# Patient Record
Sex: MALE | Race: WHITE | NOT HISPANIC OR LATINO | Employment: OTHER | URBAN - METROPOLITAN AREA
[De-identification: names, ages, dates, MRNs, and addresses within clinical notes are randomized per-mention and may not be internally consistent; named-entity substitution may affect disease eponyms.]

---

## 2017-01-04 PROBLEM — R79.1 SUPRATHERAPEUTIC INR: Status: ACTIVE | Noted: 2017-01-04

## 2017-02-01 ENCOUNTER — HOSPITAL ENCOUNTER (OUTPATIENT)
Facility: HOSPITAL | Age: 72
LOS: 1 days | Discharge: HOSPICE | End: 2017-02-03
Attending: EMERGENCY MEDICINE | Admitting: INTERNAL MEDICINE
Payer: MEDICARE
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
## 2017-02-01 DIAGNOSIS — R06.02 SOB (SHORTNESS OF BREATH): ICD-10-CM

## 2017-02-01 DIAGNOSIS — I25.10 CORONARY ARTERY DISEASE, ANGINA PRESENCE UNSPECIFIED, UNSPECIFIED VESSEL OR LESION TYPE, UNSPECIFIED WHETHER NATIVE OR TRANSPLANTED HEART: ICD-10-CM

## 2017-02-01 DIAGNOSIS — J44.1 COPD WITH EXACERBATION: Primary | ICD-10-CM

## 2017-02-01 DIAGNOSIS — I10 ESSENTIAL HYPERTENSION: ICD-10-CM

## 2017-02-01 DIAGNOSIS — J18.9 HOSPITAL-ACQUIRED PNEUMONIA: ICD-10-CM

## 2017-02-01 DIAGNOSIS — B20 HIV (HUMAN IMMUNODEFICIENCY VIRUS INFECTION): ICD-10-CM

## 2017-02-01 PROBLEM — Z79.01 LONG TERM (CURRENT) USE OF ANTICOAGULANTS: Status: ACTIVE | Noted: 2017-01-04

## 2017-02-01 LAB
ALBUMIN SERPL BCP-MCNC: 3 G/DL
ALP SERPL-CCNC: 135 U/L
ALT SERPL W/O P-5'-P-CCNC: 5 U/L
ANION GAP SERPL CALC-SCNC: 9 MMOL/L
AST SERPL-CCNC: 13 U/L
BASOPHILS # BLD AUTO: 0 K/UL
BASOPHILS NFR BLD: 0.3 %
BILIRUB SERPL-MCNC: 0.2 MG/DL
BNP SERPL-MCNC: 396 PG/ML
BUN SERPL-MCNC: 14 MG/DL
CALCIUM SERPL-MCNC: 9.1 MG/DL
CHLORIDE SERPL-SCNC: 103 MMOL/L
CO2 SERPL-SCNC: 30 MMOL/L
CREAT SERPL-MCNC: 1 MG/DL
DIFFERENTIAL METHOD: ABNORMAL
EOSINOPHIL # BLD AUTO: 0.9 K/UL
EOSINOPHIL NFR BLD: 11.3 %
ERYTHROCYTE [DISTWIDTH] IN BLOOD BY AUTOMATED COUNT: 15.7 %
EST. GFR  (AFRICAN AMERICAN): >60 ML/MIN/1.73 M^2
EST. GFR  (NON AFRICAN AMERICAN): >60 ML/MIN/1.73 M^2
GLUCOSE SERPL-MCNC: 117 MG/DL
HCT VFR BLD AUTO: 39.3 %
HGB BLD-MCNC: 12.6 G/DL
INR PPP: 1.2
LYMPHOCYTES # BLD AUTO: 3.2 K/UL
LYMPHOCYTES NFR BLD: 39.9 %
MCH RBC QN AUTO: 29.7 PG
MCHC RBC AUTO-ENTMCNC: 32 %
MCV RBC AUTO: 93 FL
MONOCYTES # BLD AUTO: 0.6 K/UL
MONOCYTES NFR BLD: 7.7 %
NEUTROPHILS # BLD AUTO: 3.3 K/UL
NEUTROPHILS NFR BLD: 40.8 %
PLATELET # BLD AUTO: 157 K/UL
PMV BLD AUTO: 7.6 FL
POTASSIUM SERPL-SCNC: 3.5 MMOL/L
PROT SERPL-MCNC: 6.4 G/DL
PROTHROMBIN TIME: 12.7 SEC
RBC # BLD AUTO: 4.24 M/UL
SODIUM SERPL-SCNC: 142 MMOL/L
WBC # BLD AUTO: 8 K/UL

## 2017-02-01 PROCEDURE — 63600175 PHARM REV CODE 636 W HCPCS: Performed by: EMERGENCY MEDICINE

## 2017-02-01 PROCEDURE — 25000003 PHARM REV CODE 250: Performed by: EMERGENCY MEDICINE

## 2017-02-01 PROCEDURE — 94761 N-INVAS EAR/PLS OXIMETRY MLT: CPT

## 2017-02-01 PROCEDURE — 25000242 PHARM REV CODE 250 ALT 637 W/ HCPCS: Performed by: EMERGENCY MEDICINE

## 2017-02-01 PROCEDURE — 36415 COLL VENOUS BLD VENIPUNCTURE: CPT

## 2017-02-01 PROCEDURE — 99285 EMERGENCY DEPT VISIT HI MDM: CPT | Mod: 25

## 2017-02-01 PROCEDURE — G0378 HOSPITAL OBSERVATION PER HR: HCPCS

## 2017-02-01 PROCEDURE — 85610 PROTHROMBIN TIME: CPT

## 2017-02-01 PROCEDURE — 83880 ASSAY OF NATRIURETIC PEPTIDE: CPT

## 2017-02-01 PROCEDURE — 63600175 PHARM REV CODE 636 W HCPCS: Performed by: INTERNAL MEDICINE

## 2017-02-01 PROCEDURE — 25000242 PHARM REV CODE 250 ALT 637 W/ HCPCS: Performed by: NURSE PRACTITIONER

## 2017-02-01 PROCEDURE — 27000221 HC OXYGEN, UP TO 24 HOURS

## 2017-02-01 PROCEDURE — 94640 AIRWAY INHALATION TREATMENT: CPT

## 2017-02-01 PROCEDURE — 80053 COMPREHEN METABOLIC PANEL: CPT

## 2017-02-01 PROCEDURE — 97802 MEDICAL NUTRITION INDIV IN: CPT | Mod: 59

## 2017-02-01 PROCEDURE — 85025 COMPLETE CBC W/AUTO DIFF WBC: CPT

## 2017-02-01 PROCEDURE — 25000003 PHARM REV CODE 250: Performed by: INTERNAL MEDICINE

## 2017-02-01 PROCEDURE — 25000003 PHARM REV CODE 250: Performed by: NURSE PRACTITIONER

## 2017-02-01 PROCEDURE — 96365 THER/PROPH/DIAG IV INF INIT: CPT

## 2017-02-01 PROCEDURE — 99220 PR INITIAL OBSERVATION CARE,LEVL III: CPT | Mod: GW,,, | Performed by: INTERNAL MEDICINE

## 2017-02-01 RX ORDER — IPRATROPIUM BROMIDE AND ALBUTEROL SULFATE 2.5; .5 MG/3ML; MG/3ML
3 SOLUTION RESPIRATORY (INHALATION) EVERY 6 HOURS
Status: DISCONTINUED | OUTPATIENT
Start: 2017-02-01 | End: 2017-02-01

## 2017-02-01 RX ORDER — GABAPENTIN 300 MG/1
900 CAPSULE ORAL 3 TIMES DAILY
Status: DISCONTINUED | OUTPATIENT
Start: 2017-02-01 | End: 2017-02-03 | Stop reason: HOSPADM

## 2017-02-01 RX ORDER — DRONABINOL 2.5 MG/1
5 CAPSULE ORAL
Status: DISCONTINUED | OUTPATIENT
Start: 2017-02-01 | End: 2017-02-03 | Stop reason: HOSPADM

## 2017-02-01 RX ORDER — EMTRICITABINE 200 MG/1
200 CAPSULE ORAL NIGHTLY
Status: DISCONTINUED | OUTPATIENT
Start: 2017-02-01 | End: 2017-02-03 | Stop reason: HOSPADM

## 2017-02-01 RX ORDER — ACETAMINOPHEN 325 MG/1
650 TABLET ORAL EVERY 6 HOURS PRN
Status: DISCONTINUED | OUTPATIENT
Start: 2017-02-01 | End: 2017-02-03 | Stop reason: HOSPADM

## 2017-02-01 RX ORDER — METHYLPREDNISOLONE SOD SUCC 125 MG
80 VIAL (EA) INJECTION EVERY 6 HOURS
Status: DISCONTINUED | OUTPATIENT
Start: 2017-02-01 | End: 2017-02-02

## 2017-02-01 RX ORDER — TRAMADOL HYDROCHLORIDE 50 MG/1
50 TABLET ORAL EVERY 6 HOURS PRN
COMMUNITY

## 2017-02-01 RX ORDER — DEXAMETHASONE SODIUM PHOSPHATE 4 MG/ML
12 INJECTION, SOLUTION INTRA-ARTICULAR; INTRALESIONAL; INTRAMUSCULAR; INTRAVENOUS; SOFT TISSUE
Status: COMPLETED | OUTPATIENT
Start: 2017-02-01 | End: 2017-02-01

## 2017-02-01 RX ORDER — TENOFOVIR DISOPROXIL FUMARATE 300 MG/1
300 TABLET, FILM COATED ORAL NIGHTLY
Status: DISCONTINUED | OUTPATIENT
Start: 2017-02-01 | End: 2017-02-03 | Stop reason: HOSPADM

## 2017-02-01 RX ORDER — FAMOTIDINE 20 MG/1
20 TABLET, FILM COATED ORAL 2 TIMES DAILY
Status: DISCONTINUED | OUTPATIENT
Start: 2017-02-01 | End: 2017-02-03 | Stop reason: HOSPADM

## 2017-02-01 RX ORDER — FLUOXETINE HYDROCHLORIDE 20 MG/1
40 CAPSULE ORAL NIGHTLY
Status: DISCONTINUED | OUTPATIENT
Start: 2017-02-01 | End: 2017-02-03 | Stop reason: HOSPADM

## 2017-02-01 RX ORDER — EFAVIRENZ 600 MG/1
600 TABLET, FILM COATED ORAL NIGHTLY
Status: DISCONTINUED | OUTPATIENT
Start: 2017-02-01 | End: 2017-02-03 | Stop reason: HOSPADM

## 2017-02-01 RX ORDER — TOPIRAMATE 25 MG/1
50 TABLET ORAL 2 TIMES DAILY
Status: DISCONTINUED | OUTPATIENT
Start: 2017-02-01 | End: 2017-02-03 | Stop reason: HOSPADM

## 2017-02-01 RX ORDER — HYDROCODONE BITARTRATE AND ACETAMINOPHEN 7.5; 325 MG/1; MG/1
TABLET ORAL EVERY 4 HOURS PRN
Status: DISCONTINUED | OUTPATIENT
Start: 2017-02-01 | End: 2017-02-03 | Stop reason: HOSPADM

## 2017-02-01 RX ORDER — VALACYCLOVIR HYDROCHLORIDE 500 MG/1
500 TABLET, FILM COATED ORAL 2 TIMES DAILY
COMMUNITY

## 2017-02-01 RX ORDER — SUCRALFATE 1 G/1
1 TABLET ORAL 4 TIMES DAILY
Status: DISCONTINUED | OUTPATIENT
Start: 2017-02-01 | End: 2017-02-03 | Stop reason: HOSPADM

## 2017-02-01 RX ORDER — IBUPROFEN 200 MG
1 TABLET ORAL DAILY
Status: DISCONTINUED | OUTPATIENT
Start: 2017-02-01 | End: 2017-02-03 | Stop reason: HOSPADM

## 2017-02-01 RX ORDER — IPRATROPIUM BROMIDE AND ALBUTEROL SULFATE 2.5; .5 MG/3ML; MG/3ML
3 SOLUTION RESPIRATORY (INHALATION)
Status: COMPLETED | OUTPATIENT
Start: 2017-02-01 | End: 2017-02-01

## 2017-02-01 RX ORDER — MORPHINE SULFATE ORAL SOLUTION 10 MG/5ML
15 SOLUTION ORAL EVERY 6 HOURS PRN
Status: DISCONTINUED | OUTPATIENT
Start: 2017-02-01 | End: 2017-02-03 | Stop reason: HOSPADM

## 2017-02-01 RX ORDER — MAGNESIUM SULFATE/D5W 1 G/50 ML
1 INTRAVENOUS SOLUTION, PIGGYBACK (ML) INTRAVENOUS ONCE
Status: COMPLETED | OUTPATIENT
Start: 2017-02-01 | End: 2017-02-01

## 2017-02-01 RX ORDER — TRAMADOL HYDROCHLORIDE 50 MG/1
50 TABLET ORAL EVERY 6 HOURS PRN
Status: DISCONTINUED | OUTPATIENT
Start: 2017-02-01 | End: 2017-02-03 | Stop reason: HOSPADM

## 2017-02-01 RX ORDER — IPRATROPIUM BROMIDE AND ALBUTEROL SULFATE 2.5; .5 MG/3ML; MG/3ML
3 SOLUTION RESPIRATORY (INHALATION) EVERY 6 HOURS
Status: DISCONTINUED | OUTPATIENT
Start: 2017-02-01 | End: 2017-02-03 | Stop reason: HOSPADM

## 2017-02-01 RX ORDER — CLOPIDOGREL BISULFATE 75 MG/1
75 TABLET ORAL DAILY
Status: DISCONTINUED | OUTPATIENT
Start: 2017-02-01 | End: 2017-02-03 | Stop reason: HOSPADM

## 2017-02-01 RX ORDER — MAGNESIUM SULFATE 500 MG/ML
1 INJECTION, SOLUTION INTRAMUSCULAR; INTRAVENOUS ONCE
Status: DISCONTINUED | OUTPATIENT
Start: 2017-02-01 | End: 2017-02-01

## 2017-02-01 RX ORDER — METOPROLOL TARTRATE 25 MG/1
12.5 TABLET ORAL 2 TIMES DAILY
Status: DISCONTINUED | OUTPATIENT
Start: 2017-02-01 | End: 2017-02-03 | Stop reason: HOSPADM

## 2017-02-01 RX ORDER — EFAVIRENZ, EMTRICITABINE AND TENOFOVIR DISOPROXIL FUMARATE 600; 200; 300 MG/1; MG/1; MG/1
1 TABLET, FILM COATED ORAL NIGHTLY
Status: DISCONTINUED | OUTPATIENT
Start: 2017-02-01 | End: 2017-02-01

## 2017-02-01 RX ORDER — IBUPROFEN 200 MG
1 TABLET ORAL
Status: COMPLETED | OUTPATIENT
Start: 2017-02-01 | End: 2017-02-02

## 2017-02-01 RX ORDER — ONDANSETRON 4 MG/1
8 TABLET, ORALLY DISINTEGRATING ORAL ONCE
COMMUNITY
End: 2017-10-12

## 2017-02-01 RX ORDER — SUCRALFATE 1 G/1
1 TABLET ORAL
Status: DISCONTINUED | OUTPATIENT
Start: 2017-02-01 | End: 2017-02-01

## 2017-02-01 RX ADMIN — Medication 12.5 MG: at 09:02

## 2017-02-01 RX ADMIN — TOPIRAMATE 50 MG: 25 TABLET, FILM COATED ORAL at 09:02

## 2017-02-01 RX ADMIN — NICOTINE 1 PATCH: 21 PATCH TRANSDERMAL at 06:02

## 2017-02-01 RX ADMIN — GABAPENTIN 900 MG: 300 CAPSULE ORAL at 06:02

## 2017-02-01 RX ADMIN — FAMOTIDINE 20 MG: 20 TABLET, FILM COATED ORAL at 09:02

## 2017-02-01 RX ADMIN — IPRATROPIUM BROMIDE AND ALBUTEROL SULFATE 3 ML: .5; 3 SOLUTION RESPIRATORY (INHALATION) at 12:02

## 2017-02-01 RX ADMIN — DEXAMETHASONE SODIUM PHOSPHATE 12 MG: 4 INJECTION, SOLUTION INTRAMUSCULAR; INTRAVENOUS at 03:02

## 2017-02-01 RX ADMIN — METHYLPREDNISOLONE SODIUM SUCCINATE 80 MG: 125 INJECTION, POWDER, FOR SOLUTION INTRAMUSCULAR; INTRAVENOUS at 12:02

## 2017-02-01 RX ADMIN — IPRATROPIUM BROMIDE AND ALBUTEROL SULFATE 3 ML: .5; 3 SOLUTION RESPIRATORY (INHALATION) at 04:02

## 2017-02-01 RX ADMIN — CLOPIDOGREL BISULFATE 75 MG: 75 TABLET ORAL at 09:02

## 2017-02-01 RX ADMIN — IPRATROPIUM BROMIDE AND ALBUTEROL SULFATE 3 ML: .5; 3 SOLUTION RESPIRATORY (INHALATION) at 03:02

## 2017-02-01 RX ADMIN — SUCRALFATE 1 G: 1 TABLET ORAL at 05:02

## 2017-02-01 RX ADMIN — DRONABINOL 5 MG: 2.5 CAPSULE ORAL at 06:02

## 2017-02-01 RX ADMIN — IPRATROPIUM BROMIDE AND ALBUTEROL SULFATE 3 ML: .5; 3 SOLUTION RESPIRATORY (INHALATION) at 07:02

## 2017-02-01 RX ADMIN — SUCRALFATE 1 G: 1 TABLET ORAL at 12:02

## 2017-02-01 RX ADMIN — Medication 1 G: at 04:02

## 2017-02-01 RX ADMIN — GABAPENTIN 900 MG: 300 CAPSULE ORAL at 09:02

## 2017-02-01 RX ADMIN — METHYLPREDNISOLONE SODIUM SUCCINATE 80 MG: 125 INJECTION, POWDER, FOR SOLUTION INTRAMUSCULAR; INTRAVENOUS at 05:02

## 2017-02-01 RX ADMIN — WARFARIN SODIUM 3 MG: 2 TABLET ORAL at 05:02

## 2017-02-01 RX ADMIN — FLUOXETINE HYDROCHLORIDE 40 MG: 20 CAPSULE ORAL at 09:02

## 2017-02-01 RX ADMIN — DRONABINOL 5 MG: 2.5 CAPSULE ORAL at 05:02

## 2017-02-01 RX ADMIN — SUCRALFATE 1 G: 1 TABLET ORAL at 06:02

## 2017-02-01 RX ADMIN — GABAPENTIN 900 MG: 300 CAPSULE ORAL at 02:02

## 2017-02-01 RX ADMIN — NICOTINE 1 PATCH: 14 PATCH, EXTENDED RELEASE TRANSDERMAL at 08:02

## 2017-02-01 RX ADMIN — WARFARIN SODIUM 3 MG: 2 TABLET ORAL at 06:02

## 2017-02-01 RX ADMIN — METHYLPREDNISOLONE SODIUM SUCCINATE 80 MG: 125 INJECTION, POWDER, FOR SOLUTION INTRAMUSCULAR; INTRAVENOUS at 06:02

## 2017-02-01 NOTE — ASSESSMENT & PLAN NOTE
Health hazards associated with cigarette smoking were reviewed with patient and cessation was encouraged. Nicotine replacement and counseling options were discussed. Patient does not wish to stop smoking.

## 2017-02-01 NOTE — ED NOTES
Brought in by EMS with IV intact and  On 2 with breathing treatment in progress. Applied 5 lead monitor to chest wall  With rate at 77 in NSR with 12 lead given to Stauffer showing NSR with No stemi. Placed on pulse ox and Non invasive BP cycling 20 mins. Has some wheezing and decreased breath sounds in lower lobes  And some coursiness in all lobes.

## 2017-02-01 NOTE — SUBJECTIVE & OBJECTIVE
Past Medical History   Diagnosis Date    Arthritis     Asbestos exposure     COPD (chronic obstructive pulmonary disease)      O2 AT NITE PRN    Coronary artery disease      STENT X 1    Depression     HIV (human immunodeficiency virus infection)     Hypertension      NO MED NOW    Migraines     Myocardial infarction     Presence of dental bridge      UPPER AND LOWER    RLS (restless legs syndrome)     Wears glasses        Past Surgical History   Procedure Laterality Date    Coronary stent placement      Facial fracture surgery  metal in left brow and cheek     1970's    Warts removed      Rectal warts  2013    Finger scraped Right      index finger scraped at S main for staph infection       Review of patient's allergies indicates:   Allergen Reactions    Unable to assess Other (See Comments)     REX (DRUG FOR HIV) TAKEN 1989 ALMOST PARALYZED LEGS    Aztreonam Nausea And Vomiting       No current facility-administered medications on file prior to encounter.      Current Outpatient Prescriptions on File Prior to Encounter   Medication Sig    acetaminophen (TYLENOL) 500 MG tablet Take 500 mg by mouth every 6 (six) hours as needed. 2 TABS    albuterol (ACCUNEB) 0.63 mg/3 mL Nebu Take 0.63 mg by nebulization 4 (four) times daily as needed.     albuterol-ipratropium 2.5mg-0.5mg/3mL (DUO-NEB) 0.5 mg-3 mg(2.5 mg base)/3 mL nebulizer solution Take 3 mLs by nebulization every 6 (six) hours as needed for Wheezing.    budesonide-formoterol 80-4.5 mcg (SYMBICORT) 80-4.5 mcg/actuation HF Inhale 2 puffs into the lungs.    clopidogrel (PLAVIX) 75 mg tablet Take 75 mg by mouth once daily.    dronabinol (MARINOL) 5 MG capsule Take 5 mg by mouth 2 (two) times daily before meals.    efavirenz-emtrictabine-tenofovir 600-200-300 mg (ATRIPLA) 600-200-300 mg Tab Take 1 tablet by mouth every evening.     ergocalciferol (ERGOCALCIFEROL) 50,000 unit Cap Take 50,000 Units by mouth every 7 days. Takes on  "Mondays    famotidine (PEPCID) 20 MG tablet Take 1 tablet (20 mg total) by mouth 2 (two) times daily.    fluoxetine (PROZAC) 40 MG capsule Take 40 mg by mouth nightly.     gabapentin (NEURONTIN) 800 MG tablet Take 900 mg by mouth 3 (three) times daily.     hydrocodone-acetaminophen (VICODIN ES) 7.5-300 mg Tab Take by mouth.    metoprolol tartrate (LOPRESSOR) 25 MG tablet 12.5 mg 2 (two) times daily.     morphine (MSIR) 15 MG tablet Take 15 mg by mouth every 2 (two) hours as needed for Pain.    nitroGLYCERIN (NITROSTAT) 0.3 MG SL tablet Place 0.3 mg under the tongue every 5 (five) minutes as needed for Chest pain.    sucralfate (CARAFATE) 1 gram tablet Take 1 tablet (1 g total) by mouth 4 (four) times daily.    tiotropium (SPIRIVA) 18 mcg inhalation capsule Inhale 18 mcg into the lungs once daily.    topiramate (TOPAMAX) 50 MG tablet Take 50 mg by mouth 2 (two) times daily. 2 TABS HS    warfarin (COUMADIN) 2.5 MG tablet Take 3 mg by mouth Daily.      Family History     Problem Relation (Age of Onset)    Asbestos Mother    Blindness Sister    COPD Mother, Brother    Cancer Mother, Brother    Depression Sister    Heart disease Maternal Grandmother    Stroke Maternal Aunt, Maternal Grandmother    Vision loss Sister        Social History Main Topics    Smoking status: Current Every Day Smoker     Packs/day: 0.50     Years: 50.00     Types: Cigarettes     Last attempt to quit: 1/16/2015    Smokeless tobacco: Never Used      Comment: Pt states "already have all the information"    Alcohol use No    Drug use: No    Sexual activity: No     Review of Systems   Constitutional: Positive for activity change and fatigue. Negative for appetite change, chills and fever.   HENT: Positive for postnasal drip. Negative for congestion, sinus pressure, sore throat and trouble swallowing.    Eyes: Negative for pain and visual disturbance.   Respiratory: Positive for cough, shortness of breath and wheezing. Negative for " chest tightness.    Cardiovascular: Positive for palpitations. Negative for chest pain and leg swelling.   Gastrointestinal: Negative for abdominal distention, abdominal pain, constipation, diarrhea, nausea and vomiting.        Abdominal fullness, gas discomfort.  Loose stools (3-4/day)   Genitourinary: Negative for difficulty urinating and dysuria.   Musculoskeletal: Negative for arthralgias, back pain and myalgias.   Skin: Negative for color change.   Neurological: Positive for weakness. Negative for dizziness, light-headedness and headaches.   Psychiatric/Behavioral: Negative for agitation, confusion and self-injury.     Objective:     Vital Signs (Most Recent):  Temp: 98.7 °F (37.1 °C) (02/01/17 0501)  Pulse: 87 (02/01/17 0501)  Resp: (!) 22 (02/01/17 0501)  BP: (!) 149/85 (02/01/17 0501)  SpO2: (!) 93 % (02/01/17 0501) Vital Signs (24h Range):  Temp:  [98.7 °F (37.1 °C)] 98.7 °F (37.1 °C)  Pulse:  [70-87] 87  Resp:  [18-24] 22  SpO2:  [93 %-100 %] 93 %  BP: (124-158)/(78-88) 149/85        There is no height or weight on file to calculate BMI.    Physical Exam   Constitutional: He is oriented to person, place, and time.   Ill- appearing, frail.   HENT:   Head: Normocephalic and atraumatic.   Eyes: Conjunctivae and EOM are normal. Pupils are equal, round, and reactive to light.   Neck: Normal range of motion. Neck supple. No thyromegaly present.   Cardiovascular: Normal rate, regular rhythm and normal heart sounds.    Pulmonary/Chest: He is in respiratory distress (mild). He has wheezes. He exhibits no tenderness.   Increased respiratory rate and work of breathing   Abdominal: Soft. Bowel sounds are normal. He exhibits no distension. There is no tenderness.   Musculoskeletal: Normal range of motion. He exhibits no edema.   Neurological: He is alert and oriented to person, place, and time. No cranial nerve deficit.   Skin: Skin is warm and dry. He is not diaphoretic.   Psychiatric: He has a normal mood and affect.  His behavior is normal. Judgment and thought content normal.        Significant Labs:   CBC:   Recent Labs  Lab 02/01/17  0405   WBC 8.00   HGB 12.6*   HCT 39.3*        CMP:   Recent Labs  Lab 02/01/17  0406      K 3.5      CO2 30*   *   BUN 14   CREATININE 1.0   CALCIUM 9.1   PROT 6.4   ALBUMIN 3.0*   BILITOT 0.2   ALKPHOS 135   AST 13   ALT 5*   ANIONGAP 9   EGFRNONAA >60     Coagulation:   Recent Labs  Lab 02/01/17  0406   INR 1.2       Significant Imaging: CXR: I have reviewed all pertinent results/findings within the past 24 hours and my personal findings are:  +emphysematous changes. No infiltrate, no effusions.  No change when compared to last (my read).

## 2017-02-01 NOTE — ED NOTES
Increase in breath sound with decrease is some wheezing at this time. No cp and no abd Neb treatments complete. IV mag drip still infusing with no complaints transport to floor by [facility] ED Melissa. Report Called to Nidhi on 2nd floor.

## 2017-02-01 NOTE — ASSESSMENT & PLAN NOTE
With reported symptomatic improvement s/p nebulizer treatment and steroid administration. Supplemental O2 via nasal canula; titrate O2 saturation to >92%.  Solumedrol 80 mg IV q 6 hours. Continue beta 2 agonist bronchodilator treatments. Do not feel patient needs antibiotic therapy at this time. Monitor closely.

## 2017-02-01 NOTE — PLAN OF CARE
Problem: Patient Care Overview  Goal: Plan of Care Review  Outcome: Ongoing (interventions implemented as appropriate)  Patient alert and oriented resting in bed. NAD. Denies pain or SOB. VSS. O2 @2L NC. Normal SR. Urinal at bedside.  Plan of care reviewed with patient. Verbalizes understanding.Call light in reach. Pt free from fall or injury. Will monitor.

## 2017-02-01 NOTE — ED PROVIDER NOTES
Encounter Date: 2017       History     Chief Complaint   Patient presents with    Shortness of Breath     Review of patient's allergies indicates:   Allergen Reactions    Unable to assess Other (See Comments)     REX (DRUG FOR HIV) TAKEN  ALMOST PARALYZED LEGS    Aztreonam Nausea And Vomiting     HPI Comments: Chief complaint: Shortness of breath    History of present illness:Boni Lerner is a 71 y.o. male who presents with  a three-day history of progressively worsening shortness of breath.  He has a history of COPD and continues to smoke one pack of cigarettes per day.  He also has a history of HIV but is had no history of HIV pneumonia.  He reports that his last CD4 count 3 months ago was 650.  He has no history of congestive heart failure and denies any lower extremity swelling.  He has no chest pain or palpitations.    The history is provided by the patient.     Past Medical History   Diagnosis Date    Arthritis     Asbestos exposure     COPD (chronic obstructive pulmonary disease)      O2 AT NITE PRN    Coronary artery disease      STENT X 1    Depression     HIV (human immunodeficiency virus infection)     Hypertension      NO MED NOW    Migraines     Myocardial infarction     Presence of dental bridge      UPPER AND LOWER    RLS (restless legs syndrome)     Wears glasses      Past Medical History Pertinent Negatives   Diagnosis Date Noted    Blood transfusion 10/31/2013     Past Surgical History   Procedure Laterality Date    Coronary stent placement      Facial fracture surgery  metal in left brow and cheek     's    Warts removed      Rectal warts      Finger scraped Right      index finger scraped at [facility] main for staph infection     Family History   Problem Relation Age of Onset    COPD Mother     Cancer Mother       of lung ca w/mets to stomach    Asbestos Mother     Vision loss Sister     Blindness Sister      with bilatereal removal     Depression  "Sister     Cancer Brother      lung    COPD Brother     Stroke Maternal Aunt     Heart disease Maternal Grandmother      pacemaker    Stroke Maternal Grandmother      Social History   Substance Use Topics    Smoking status: Current Every Day Smoker     Packs/day: 0.50     Years: 50.00     Types: Cigarettes     Last attempt to quit: 1/16/2015    Smokeless tobacco: Never Used      Comment: Pt states "already have all the information"    Alcohol use No     Review of Systems   Constitutional: Negative for activity change, appetite change and fever.   HENT: Negative for voice change.    Eyes: Negative for visual disturbance.   Respiratory: Positive for cough, shortness of breath and wheezing. Negative for apnea.    Cardiovascular: Negative for chest pain.   Gastrointestinal: Negative for abdominal pain and vomiting.   Genitourinary: Negative for decreased urine volume.   Musculoskeletal: Negative for back pain and neck pain.   Skin: Negative for color change.   Neurological: Negative for weakness and headaches.   Hematological: Does not bruise/bleed easily.   Psychiatric/Behavioral: Negative for confusion.       Physical Exam   Initial Vitals   BP Pulse Resp Temp SpO2   -- -- -- -- --            Physical Exam    Nursing note reviewed.  Constitutional: Vital signs are normal. He appears well-developed and well-nourished.   HENT:   Head: Normocephalic and atraumatic.   Eyes: Conjunctivae are normal.   Neck: Trachea normal and phonation normal.   Cardiovascular: Normal rate and regular rhythm.   Pulmonary/Chest: He is in respiratory distress. He has wheezes. He has rhonchi.   Abdominal: Soft. Normal appearance. There is no tenderness.   Neurological: He is alert and oriented to person, place, and time.   Skin: Skin is warm and dry.   Psychiatric: He has a normal mood and affect. His speech is normal.         ED Course   Procedures  Labs Reviewed   B-TYPE NATRIURETIC PEPTIDE - Abnormal; Notable for the following:     "    Result Value     (*)     All other components within normal limits   COMPREHENSIVE METABOLIC PANEL - Abnormal; Notable for the following:     CO2 30 (*)     Glucose 117 (*)     Albumin 3.0 (*)     ALT 5 (*)     All other components within normal limits   CBC W/ AUTO DIFFERENTIAL - Abnormal; Notable for the following:     RBC 4.24 (*)     Hemoglobin 12.6 (*)     Hematocrit 39.3 (*)     RDW 15.7 (*)     MPV 7.6 (*)     Eos # 0.9 (*)     Eosinophil% 11.3 (*)     All other components within normal limits   PROTIME-INR - Abnormal; Notable for the following:     Prothrombin Time 12.7 (*)     All other components within normal limits     EKG Readings: (Independently Interpreted)   Rhythm: Normal Sinus Rhythm. Heart Rate: 79. Ectopy: PACs. Conduction: Normal. ST Segments: Normal ST Segments. T Waves: Normal. T Waves Flipped: V6 and V5. Axis: Left Axis Deviation. Clinical Impression: Normal Sinus Rhythm          Medical Decision Making:   History:   Old Records Summarized: records from previous admission(s).  Independently Interpreted Test(s):   I have ordered and independently interpreted X-rays - see summary below.       <> Summary of X-Ray Reading(s): Chest x-ray independently interpreted by me demonstrates diffuse fibrotic changes unchanged from December 26, 2016.  ED Management:  Boni Lerner is a 71 y.o. male who presents with  three-day history of progressively worsening shortness of breath.  Physical exam are strongly suggestive of a COPD exacerbation.  There is only modest improvement with bronchodilators.  He will be admitted for IV steroids and bronchodilators.  Other:   I have discussed this case with another health care provider.       <> Summary of the Discussion: Discussed with Radha who will admit on behalf of Dr. Nolasco.                   ED Course     Clinical Impression:   The primary encounter diagnosis was COPD with exacerbation. A diagnosis of SOB (shortness of breath) was also  pertinent to this visit.          Magdiel Stauffer III, MD  02/01/17 0516

## 2017-02-01 NOTE — PLAN OF CARE
Problem: Nutrition, Imbalanced: Inadequate Oral Intake (Adult)  Goal: Identify Related Risk Factors and Signs and Symptoms  Related risk factors and signs and symptoms are identified upon initiation of Human Response Clinical Practice Guideline (CPG)  Recommendation/Intervention: 1) Continue with regular diet with pt preferences   Goals: 1) Pt to consume at least 75% of meals  Nutrition Goal Status: new  Communication of RD Recs: (care plan)

## 2017-02-01 NOTE — PLAN OF CARE
02/01/17 1210   Discharge Assessment   Assessment Type Discharge Planning Assessment   Confirmed/corrected address and phone number on facesheet? Yes   Assessment information obtained from? Patient   Expected Length of Stay (days) 2   Communicated expected length of stay with patient/caregiver yes   Prior to hospitilization cognitive status: Alert/Oriented   Prior to hospitalization functional status: Needs Assistance   Current cognitive status: Alert/Oriented   Current Functional Status: Needs Assistance   Arrived From hospice/home   Lives With other relative(s)  (Burak Isbell, nephew)   Able to Return to Prior Arrangements yes   Is patient able to care for self after discharge? No   How many people do you have in your home that can help with your care after discharge? 1   Who are your caregiver(s) and their phone number(s)? Burak Isbell [phone]   Patient's perception of discharge disposition hospice/home   Patient currently being followed by outpatient case management? No   Patient currently receives home health services? No   Does the patient currently use HME? Yes   Equipment Currently Used at Home wheelchair;oxygen;bedside commode;BIPAP;other (see comments)   Do you have any problems affording any of your prescribed medications? No   Is the patient taking medications as prescribed? no   Do you have any financial concerns preventing you from receiving the healthcare you need? No   Does the patient have transportation to healthcare appointments? No   On Dialysis? No   Does the patient receive services at the [facility]? No   Are there any open cases? No   Discharge Plan A Hospice/home   Patient/Family In Agreement With Plan yes     Assessment completed by Pt.he is alert and oriented and very talkative, states he is currently followed by [facility]lin at home.  Call to Hospice [phone], spoke to SOTO Waller, states she will have her liaison come visit Pt. Re readmission to hospice.  Lee has been  following Pt since December of 2016.  Pt. did not call hospice prior to coming to the ED for SOB.  Disclosure for hospice signed and placed in blue folder.   Pt. States sister Alyse will provide transportation home at discharge, states he will call her for ride when d/c.  CM will con't to follow while in the hospital and assist with d/c needs.

## 2017-02-01 NOTE — IP AVS SNAPSHOT
[address] [place] [address]  Phone: [phone]           Patient Discharge Instructions     Our goal is to set you up for success. This packet includes information on your condition, medications, and your home care. It will help you to care for yourself so you don't get sicker and need to go back to the hospital.     Please ask your nurse if you have any questions.        There are many details to remember when preparing to leave the hospital. Here is what you will need to do:    1. Take your medicine. If you are prescribed medications, review your Medication List in the following pages. You may have new medications to  at the pharmacy and others that you'll need to stop taking. Review the instructions for how and when to take your medications. Talk with your doctor or nurses if you are unsure of what to do.     2. Go to your follow-up appointments. Specific follow-up information is listed in the following pages. Your may be contacted by a transition nurse or clinical provider about future appointments. Be sure we have all of the phone numbers to reach you, if needed. Please contact your provider's office if you are unable to make an appointment.     3. Watch for warning signs. Your doctor or nurse will give you detailed warning signs to watch for and when to call for assistance. These instructions may also include educational information about your condition. If you experience any of warning signs to your health, call your doctor.               [facility]  Unless otherwise directed by your provider, please contact [facility], our nurse care line that is available for 24/7 assistance.     [phone] (toll-free)    Registered nurses in the [facility] provide clinical advisement, health education, appointment booking, and other advisory services.                    ** Verify the list of medication(s) below is accurate and up to date.  Carry this with you in case of emergency. If your medications have changed, please notify your healthcare provider.             Medication List      START taking these medications        Additional Info    Begin Date AM Noon PM Bedtime    azithromycin 500 MG tablet   Commonly known as:  ZITHROMAX   Quantity:  5 tablet   Refills:  0   Dose:  500 mg    Instructions:  Take 1 tablet (500 mg total) by mouth once daily.                               doxycycline 100 MG Cap   Commonly known as:  VIBRAMYCIN   Quantity:  14 capsule   Refills:  0   Dose:  100 mg    Instructions:  Take 1 capsule (100 mg total) by mouth 2 (two) times daily.                                  methylPREDNISolone 4 mg tablet   Commonly known as:  MEDROL DOSEPACK   Quantity:  21 tablet   Refills:  0    Instructions:  follow package directions                              CONTINUE taking these medications        Additional Info    Begin Date AM Noon PM Bedtime    acetaminophen 500 MG tablet   Commonly known as:  TYLENOL   Refills:  0   Dose:  500 mg   Indications:  Pain    Instructions:  Take 500 mg by mouth every 6 (six) hours as needed. 2 TABS                            albuterol 0.63 mg/3 mL Nebu   Commonly known as:  ACCUNEB   Refills:  0   Dose:  0.63 mg    Instructions:  Take 0.63 mg by nebulization 4 (four) times daily as needed.                            albuterol-ipratropium 2.5mg-0.5mg/3mL 0.5 mg-3 mg(2.5 mg base)/3 mL nebulizer solution   Commonly known as:  DUO-NEB   Quantity:  25 vial   Refills:  0   Dose:  3 mL    Last time this was given:  3 mLs on 2/3/2017  8:28 AM   Instructions:  Take 3 mLs by nebulization every 6 (six) hours as needed for Wheezing.                            budesonide-formoterol 80-4.5 mcg 80-4.5 mcg/actuation Hfaa   Commonly known as:  SYMBICORT   Refills:  0   Dose:  2 puff    Instructions:  Inhale 2 puffs into the lungs.                            clopidogrel 75 mg tablet   Commonly known as:  PLAVIX   Refills:   0   Dose:  75 mg    Last time this was given:  75 mg on 2/3/2017  9:55 AM   Instructions:  Take 75 mg by mouth once daily.                               dronabinol 5 MG capsule   Commonly known as:  MARINOL   Refills:  0   Dose:  5 mg    Last time this was given:  5 mg on 2/3/2017  5:19 AM   Instructions:  Take 5 mg by mouth 2 (two) times daily before meals.                                  efavirenz-emtrictabine-tenofovir 600-200-300 mg 600-200-300 mg Tab   Commonly known as:  ATRIPLA   Refills:  0   Dose:  1 tablet    Instructions:  Take 1 tablet by mouth every evening.                               ergocalciferol 50,000 unit Cap   Commonly known as:  ERGOCALCIFEROL   Refills:  0   Dose:  32986 Units    Instructions:  Take 50,000 Units by mouth every 7 days. Takes on Mondays                               famotidine 20 MG tablet   Commonly known as:  PEPCID   Quantity:  20 tablet   Refills:  1   Dose:  20 mg    Last time this was given:  20 mg on 2/3/2017  9:55 AM   Instructions:  Take 1 tablet (20 mg total) by mouth 2 (two) times daily.                                  fluoxetine 40 MG capsule   Commonly known as:  PROZAC   Refills:  0   Dose:  40 mg    Last time this was given:  40 mg on 2/2/2017  8:53 PM   Instructions:  Take 40 mg by mouth nightly.                               gabapentin 800 MG tablet   Commonly known as:  NEURONTIN   Refills:  0   Dose:  900 mg    Instructions:  Take 900 mg by mouth 3 (three) times daily.                                     metoprolol tartrate 25 MG tablet   Commonly known as:  LOPRESSOR   Refills:  3   Dose:  12.5 mg    Last time this was given:  12.5 mg on 2/3/2017  9:55 AM   Instructions:  12.5 mg 2 (two) times daily.                                  morphine 15 MG tablet   Commonly known as:  MSIR   Refills:  0   Dose:  15 mg    Instructions:  Take 15 mg by mouth every 2 (two) hours as needed for Pain.                            nitroGLYCERIN 0.3 MG SL tablet   Commonly  known as:  NITROSTAT   Refills:  0   Dose:  0.3 mg    Instructions:  Place 0.3 mg under the tongue every 5 (five) minutes as needed for Chest pain.                            ondansetron 4 MG Tbdl   Commonly known as:  ZOFRAN-ODT   Refills:  0   Dose:  8 mg    Instructions:  Take 8 mg by mouth once.                               sucralfate 1 gram tablet   Commonly known as:  CARAFATE   Quantity:  30 tablet   Refills:  0   Dose:  1 g    Last time this was given:  1 g on 2/3/2017  5:19 AM   Instructions:  Take 1 tablet (1 g total) by mouth 4 (four) times daily.                                        tiotropium 18 mcg inhalation capsule   Commonly known as:  SPIRIVA   Refills:  0   Dose:  18 mcg    Instructions:  Inhale 18 mcg into the lungs once daily.                               topiramate 50 MG tablet   Commonly known as:  TOPAMAX   Refills:  0   Dose:  50 mg   Indications:  Migraine Prevention    Last time this was given:  50 mg on 2/3/2017  9:55 AM   Instructions:  Take 50 mg by mouth 2 (two) times daily. 2 TABS HS                               tramadol 50 mg tablet   Commonly known as:  ULTRAM   Refills:  0   Dose:  50 mg    Instructions:  Take 50 mg by mouth every 6 (six) hours as needed for Pain.                            valacyclovir 500 MG tablet   Commonly known as:  VALTREX   Refills:  0   Dose:  500 mg    Instructions:  Take 500 mg by mouth 2 (two) times daily.                                     VICODIN ES 7.5-300 mg Tab   Refills:  0   Generic drug:  hydrocodone-acetaminophen    Instructions:  Take by mouth.                               warfarin 2.5 MG tablet   Commonly known as:  COUMADIN   Refills:  2   Dose:  3 mg    Last time this was given:  3 mg on 2/2/2017  4:55 PM   Instructions:  Take 3 mg by mouth Daily.                                    Where to Get Your Medications      You can get these medications from any pharmacy     Bring a paper prescription for each of these medications      "azithromycin 500 MG tablet    doxycycline 100 MG Cap    methylPREDNISolone 4 mg tablet                  Please bring to all follow up appointments:    1. A copy of your discharge instructions.  2. All medicines you are currently taking in their original bottles.  3. Identification and insurance card.    Please arrive 15 minutes ahead of scheduled appointment time.    Please call 24 hours in advance if you must reschedule your appointment and/or time.        Follow-up Information     Follow up with Gilberto Rosas MD On 2/16/2017.    Specialty:  Family Medicine    Why:  @9:00am     Contact information:    [place] [address]  [phone]          Please follow up.    Contact information:    Please stop smoking.         Please follow up.    Contact information:    Resume INR monitoring as before      Referrals     Future Orders    Ambulatory referral to Home Health         Discharge Instructions     Future Orders    Call MD for:     Comments:    For worsening symptoms, chest pain, shortness of breath, increased abdominal pain, high grade fever, stroke or stroke like symptoms, immediately go to the nearest Emergency Room or call 911 as soon as possible.    COMMODE FOR HOME USE     Questions:    Type:  Standard    Height:  5' 10" (1.778 m)    Weight:  56.7 kg (125 lb)    Does patient have medical equipment at home?:  wheelchair    oxygen    bedside commode    BIPAP    other (see comments)    Length of need (1-99 months):  11    Diet general     Comments:    Cardiac/ 2 gram sodium low cholesterol diet    Questions:    Total calories:      Fat restriction, if any:      Protein restriction, if any:      Na restriction, if any:      Fluid restriction:      Additional restrictions:      Other restrictions (specify):     Comments:    Fall precautions        Primary Diagnosis     Your primary diagnosis was:  Chronic Bronchitis      Admission Information     Date & Time Provider Department [facility]    2/1/2017  3:23 " "AM Analilia Nolasco MD [facility] [id number]      Care Providers     Provider Role Specialty Primary office phone    Analilia Nolasco MD Attending Provider Internal Medicine [phone]      Your Vitals Were     BP Pulse Temp Resp Height Weight    120/75 67 97.7 °F (36.5 °C) (Oral) 14 5' 10" (1.778 m) 56.7 kg (125 lb)    SpO2 BMI             98% 17.94 kg/m2         Recent Lab Values     No lab values to display.      Allergies as of 2/3/2017        Reactions    Unable To Assess Other (See Comments)    EVELINIAN (DRUG FOR HIV) TAKEN 1989 ALMOST PARALYZED LEGS    Aztreonam Nausea And Vomiting      Advance Directives     An advance directive is a document which, in the event you are no longer able to make decisions for yourself, tells your healthcare team what kind of treatment you do or do not want to receive, or who you would like to make those decisions for you.  If you do not currently have an advance directive, [facility] encourages you to create one.  For more information call:  [phone]), [phone] ([phone]),  or log on to [email or web address].        Smoking Cessation     If you would like to quit smoking:   You may be eligible for free services if you are a [place] resident and started smoking cigarettes before September 1, 1988.  Call the [employer]oking Cessation [employer] ([employer]) toll free at (369) 512-7951 or (297) 284-2557.   Call -800-QUIT-NOW if you do not meet the above criteria.            Language Assistance Services     ATTENTION: Language assistance services are available, free of charge. Please call [phone].      ATENCIÓN: Si habla español, tiene a kendrick disposición servicios gratuitos de asistencia lingüística. Llame al [phone].     CHÚ Ý: N?u b?n nói Ti?ng Vi?t, có các d?ch v? h? tr? ngôn ng? mi?n phí dành cho b?n. G?i s? [phone].        Pneumonmia Discharge Instructions                Coumadin Discharge Instructions                          [facility] complies with applicable Federal civil rights laws and does not discriminate on the basis of race, color, national origin, age, disability, or sex.

## 2017-02-01 NOTE — PROGRESS NOTES
[facility]  Adult Nutrition  Progress Note    SUMMARY     Recommendations    Recommendation/Intervention: 1) Continue with regular diet with pt preferences   Goals: 1) Pt to consume at least 75% of meals  Nutrition Goal Status: new  Communication of RD Recs:  (care plan)    1. COPD with exacerbation    2. SOB (shortness of breath)      Past Medical History   Diagnosis Date    Arthritis     Asbestos exposure     COPD (chronic obstructive pulmonary disease)      O2 AT NITE PRN    Coronary artery disease      STENT X 1    Depression     HIV (human immunodeficiency virus infection)     Hypertension      NO MED NOW    Migraines     Myocardial infarction     Presence of dental bridge      UPPER AND LOWER    RLS (restless legs syndrome)     Wears glasses      Discharge Plan  Regular diet       Reason for Assessment    Reason for Assessment: identified at risk by screening criteria , unintentional wt loss      General Information Comments: Pt is DNR and on hospice.  Reports good appetite, and does not care for Boost.  Reports he ate 100% of breakfast.     Nutrition Prescription Ordered    Current Diet Order: Regular  Nutrition Order Comments:  (Regular diet provides an average of 2400 calories/day)     Evaluation of Received Nutrients/Fluid Intake      Fluid Required:  (no fluid intake recorded at this time, <4 hrs on the floor)      Nutrition Risk Screen     Nutrition Risk Screen: unintentional loss of 10 lbs or more in the past 2 mos    Nutrition/Diet History    Patient Reported Diet/Restrictions/Preferences: general     Labs/Tests/Procedures/Meds    Diagnostic Test/Procedure Review: reviewed, pertinent  Pertinent Labs Reviewed: reviewed, pertinent      Lab Results   Component Value Date    ALBUMIN 3.0 (L) 02/01/2017     Lab Results   Component Value Date    CRP 1.4 01/29/2015     Lab Results   Component Value Date    CHOL 158 04/06/2014     Lab Results   Component Value Date    HDL 46  04/06/2014    HDL 43 07/22/2013    HDL 48 05/07/2013     Lab Results   Component Value Date    LDLCALC 97.4 04/06/2014    LDLCALC 83 07/22/2013    LDLCALC 85 05/07/2013     Lab Results   Component Value Date    TRIG 73 04/06/2014     Lab Results   Component Value Date    CHOLHDL 29.1 04/06/2014     Pertinent Medications Reviewed: reviewed, pertinent      Scheduled Meds:   albuterol-ipratropium 2.5mg-0.5mg/3mL  3 mL Nebulization Q6H    clopidogrel  75 mg Oral Daily    dronabinol  5 mg Oral BID AC    efavirenz  600 mg Oral QHS    And    emtricitabine  200 mg Oral QHS    And    tenofovir  300 mg Oral QHS    famotidine  20 mg Oral BID    fluoxetine  40 mg Oral Nightly    gabapentin  900 mg Oral TID    methylPREDNISolone sodium succinate  80 mg Intravenous Q6H    metoprolol tartrate  12.5 mg Oral BID    nicotine  1 patch Transdermal ED 1 Time    sucralfate  1 g Oral QID    topiramate  50 mg Oral BID    warfarin  3 mg Oral Daily     Continuous Infusions:   PRN Meds:.acetaminophen, hydrocodone-acetaminophen 7.5-325mg, morphine, tramadol    Physical Findings    Overall Physical Appearance: loss of muscle mass, underweight  Tubes:  (nasal cannula)  Oral/Mouth Cavity: dental applicance present (specify)  Skin:  (Karl score 17)    Anthropometrics    Height Method: Stated  Height (inches): 70 in  Weight Method: Stated  Weight (kg): 56.7 kg  Ideal Body Weight (IBW), Male: 166 lb     % Ideal Body Weight, Male (lb): 75.3 lb     BMI (kg/m2): 17.94     Anthropometrics (Special Considerations)         Estimated/Assessed Needs    Weight Used For Calorie Calculations: 56.7 kg (125 lb)   Height (cm): 177.8 cm   Energy Need Method: Kcal/kg    (1984-2268 (35-40 kcal/kg for wt gain))      Weight Used For Protein Calculations: 56.7 kg (125 lb)  Protein Requirements:  ( gm/day (1.2-2.0 gm/kg/day))    Fluid Need Method:  (1 ml/kcal or per MD rec)     Malnutrition (Undernutrition) Diagnosis  % Meal Intake: 100% (only  breakfast intake at this time)       Nutrition Diagnosis    Nutrition Problem: Underweight  Etiology/Related To: Acuity of illness  Nutrition Diagnosis Signs/Symptoms As Evidenced By: BMI of 17.87  Nutrition Diagnosis Status: New    Monitor and Evaluation    Food and Nutrient Intake: energy intake  Food and Nutrient Adminstration: diet order   Anthropometric Measurements: weight, weight change  Biochemical Data, Medical Tests and Procedures: inflammatory profile, lipid profile  Nutrition-Focused Physical Findings: overall appearance, skin    Nutrition Risk    Level of Risk:  (Follow up 2 times weekly)    Nutrition Follow-Up    RD Follow-up?: Yes

## 2017-02-01 NOTE — NURSING
Pt arrived to unit via bed.  Admit assessment completed. VSS.  O2 @ 2L NC. Plan of care review with patient. Bed in lowest position. Call light in reach. Pt verbalize understanding. Will continue to monitor.

## 2017-02-01 NOTE — PLAN OF CARE
Problem: Patient Care Overview  Goal: Plan of Care Review  ;Aerosol txs Q6 hrs with Nc 3 lpm in use.  Patient has Nc 3 lpm at home as well

## 2017-02-01 NOTE — H&P
"[facility] Hospital Medicine  History & Physical    Patient Name: Boni Lerner  MRN: [id number]  Admission Date: 2/1/2017  Attending Physician: Analilia Nolasco MD   Primary Care Provider: Gilberto Rosas MD         Patient information was obtained from patient and ER records.     Subjective:     Principal Problem:COPD with exacerbation    Chief Complaint:  Shortness of breath     HPI: Boni Lerner is a 71 y.o. Male with PMHx significant for COPD, HIV, and CAD.  He was admitted to the service of hospital medicine with COPD exacerbation.  He reported to the ED with complaint of SOB that worsened from its chronic state approximately 3 days ago and has progressed since that time to a point where he "couldn't take it anymore."  The SOB was accompanied by a cough with clear sputum production and wheezing.  He reported the SOB was worsened with activity and did not improve with "extra oxygen" from CPAP machine.  He denies any fever, chills, nausea or vomiting.  He stated he still receives hospice care and would like to continue with [facility], but felt he needed treatment to feel better as he is being baptized on Saturday.  He continues to smoke about 1/2 ppd.  Other pertinent medical history as below:    Past Medical History   Diagnosis Date    Arthritis     Asbestos exposure     COPD (chronic obstructive pulmonary disease)      O2 AT NITE PRN    Coronary artery disease      STENT X 1    Depression     HIV (human immunodeficiency virus infection)     Hypertension      NO MED NOW    Migraines     Myocardial infarction     Presence of dental bridge      UPPER AND LOWER    RLS (restless legs syndrome)     Wears glasses        Past Surgical History   Procedure Laterality Date    Coronary stent placement      Facial fracture surgery  metal in left brow and cheek     1970's    Warts removed      Rectal warts  2013    Finger scraped Right      index finger scraped at S main for staph " infection       Review of patient's allergies indicates:   Allergen Reactions    Unable to assess Other (See Comments)     REX (DRUG FOR HIV) TAKEN 1989 ALMOST PARALYZED LEGS    Aztreonam Nausea And Vomiting       No current facility-administered medications on file prior to encounter.      Current Outpatient Prescriptions on File Prior to Encounter   Medication Sig    acetaminophen (TYLENOL) 500 MG tablet Take 500 mg by mouth every 6 (six) hours as needed. 2 TABS    albuterol (ACCUNEB) 0.63 mg/3 mL Nebu Take 0.63 mg by nebulization 4 (four) times daily as needed.     albuterol-ipratropium 2.5mg-0.5mg/3mL (DUO-NEB) 0.5 mg-3 mg(2.5 mg base)/3 mL nebulizer solution Take 3 mLs by nebulization every 6 (six) hours as needed for Wheezing.    budesonide-formoterol 80-4.5 mcg (SYMBICORT) 80-4.5 mcg/actuation HFAA Inhale 2 puffs into the lungs.    clopidogrel (PLAVIX) 75 mg tablet Take 75 mg by mouth once daily.    dronabinol (MARINOL) 5 MG capsule Take 5 mg by mouth 2 (two) times daily before meals.    efavirenz-emtrictabine-tenofovir 600-200-300 mg (ATRIPLA) 600-200-300 mg Tab Take 1 tablet by mouth every evening.     ergocalciferol (ERGOCALCIFEROL) 50,000 unit Cap Take 50,000 Units by mouth every 7 days. Takes on Mondays    famotidine (PEPCID) 20 MG tablet Take 1 tablet (20 mg total) by mouth 2 (two) times daily.    fluoxetine (PROZAC) 40 MG capsule Take 40 mg by mouth nightly.     gabapentin (NEURONTIN) 800 MG tablet Take 900 mg by mouth 3 (three) times daily.     hydrocodone-acetaminophen (VICODIN ES) 7.5-300 mg Tab Take by mouth.    metoprolol tartrate (LOPRESSOR) 25 MG tablet 12.5 mg 2 (two) times daily.     morphine (MSIR) 15 MG tablet Take 15 mg by mouth every 2 (two) hours as needed for Pain.    nitroGLYCERIN (NITROSTAT) 0.3 MG SL tablet Place 0.3 mg under the tongue every 5 (five) minutes as needed for Chest pain.    sucralfate (CARAFATE) 1 gram tablet Take 1 tablet (1 g total) by mouth 4  "(four) times daily.    tiotropium (SPIRIVA) 18 mcg inhalation capsule Inhale 18 mcg into the lungs once daily.    topiramate (TOPAMAX) 50 MG tablet Take 50 mg by mouth 2 (two) times daily. 2 TABS HS    warfarin (COUMADIN) 2.5 MG tablet Take 3 mg by mouth Daily.      Family History     Problem Relation (Age of Onset)    Asbestos Mother    Blindness Sister    COPD Mother, Brother    Cancer Mother, Brother    Depression Sister    Heart disease Maternal Grandmother    Stroke Maternal Aunt, Maternal Grandmother    Vision loss Sister        Social History Main Topics    Smoking status: Current Every Day Smoker     Packs/day: 0.50     Years: 50.00     Types: Cigarettes     Last attempt to quit: 1/16/2015    Smokeless tobacco: Never Used      Comment: Pt states "already have all the information"    Alcohol use No    Drug use: No    Sexual activity: No     Review of Systems   Constitutional: Positive for activity change and fatigue. Negative for appetite change, chills and fever.   HENT: Positive for postnasal drip. Negative for congestion, sinus pressure, sore throat and trouble swallowing.    Eyes: Negative for pain and visual disturbance.   Respiratory: Positive for cough, shortness of breath and wheezing. Negative for chest tightness.    Cardiovascular: Positive for palpitations. Negative for chest pain and leg swelling.   Gastrointestinal: Negative for abdominal distention, abdominal pain, constipation, diarrhea, nausea and vomiting.        Abdominal fullness, gas discomfort.  Loose stools (3-4/day)   Genitourinary: Negative for difficulty urinating and dysuria.   Musculoskeletal: Negative for arthralgias, back pain and myalgias.   Skin: Negative for color change.   Neurological: Positive for weakness. Negative for dizziness, light-headedness and headaches.   Psychiatric/Behavioral: Negative for agitation, confusion and self-injury.     Objective:     Vital Signs (Most Recent):  Temp: 98.7 °F (37.1 °C) (02/01/17 " 0501)  Pulse: 87 (02/01/17 0501)  Resp: (!) 22 (02/01/17 0501)  BP: (!) 149/85 (02/01/17 0501)  SpO2: (!) 93 % (02/01/17 0501) Vital Signs (24h Range):  Temp:  [98.7 °F (37.1 °C)] 98.7 °F (37.1 °C)  Pulse:  [70-87] 87  Resp:  [18-24] 22  SpO2:  [93 %-100 %] 93 %  BP: (124-158)/(78-88) 149/85        There is no height or weight on file to calculate BMI.    Physical Exam   Constitutional: He is oriented to person, place, and time.   Ill- appearing, frail.   HENT:   Head: Normocephalic and atraumatic.   Eyes: Conjunctivae and EOM are normal. Pupils are equal, round, and reactive to light.   Neck: Normal range of motion. Neck supple. No thyromegaly present.   Cardiovascular: Normal rate, regular rhythm and normal heart sounds.    Pulmonary/Chest: He is in respiratory distress (mild). He has wheezes. He exhibits no tenderness.   Increased respiratory rate and work of breathing   Abdominal: Soft. Bowel sounds are normal. He exhibits no distension. There is no tenderness.   Musculoskeletal: Normal range of motion. He exhibits no edema.   Neurological: He is alert and oriented to person, place, and time. No cranial nerve deficit.   Skin: Skin is warm and dry. He is not diaphoretic.   Psychiatric: He has a normal mood and affect. His behavior is normal. Judgment and thought content normal.        Significant Labs:   CBC:   Recent Labs  Lab 02/01/17  0405   WBC 8.00   HGB 12.6*   HCT 39.3*        CMP:   Recent Labs  Lab 02/01/17  0406      K 3.5      CO2 30*   *   BUN 14   CREATININE 1.0   CALCIUM 9.1   PROT 6.4   ALBUMIN 3.0*   BILITOT 0.2   ALKPHOS 135   AST 13   ALT 5*   ANIONGAP 9   EGFRNONAA >60     Coagulation:   Recent Labs  Lab 02/01/17  0406   INR 1.2       Significant Imaging: CXR: I have reviewed all pertinent results/findings within the past 24 hours and my personal findings are:  +emphysematous changes. No infiltrate, no effusions.  No change when compared to last (my  read).    Assessment/Plan:     * COPD with exacerbation  With reported symptomatic improvement s/p nebulizer treatment and steroid administration. Supplemental O2 via nasal canula; titrate O2 saturation to >92%.  Solumedrol 80 mg IV q 6 hours. Continue beta 2 agonist bronchodilator treatments. Do not feel patient needs antibiotic therapy at this time. Monitor closely.    CAD (coronary artery disease)  Chronic, Without angina. Cardiac monitoring.  Continue plavix, BBlockade.    HIV (human immunodeficiency virus infection)  Chronic, continue home regimen antiretroviral therapy.      Long term (current) use of anticoagulants  Subtherapeutic at this time with INR 1.2.  Will give additional 3mg coumadin dose at this time and continue with coumadin as scheduled monitoring daily INR.      Tobacco abuse  Health hazards associated with cigarette smoking were reviewed with patient and cessation was encouraged. Nicotine replacement and counseling options were discussed. Patient does not wish to stop smoking.    Patient is currently under the care of [facility].  He wishes to remain DNR and discharge under the care of [facility].  He does not wish to have prolonged hospitalization and once he is stabilized would benefit from discharge into the care of [facility] with oral steroids and nebulizer treatments for home use.    VTE Risk Mitigation         Ordered     Medium Risk of VTE  Coumadin     02/01/17 0336   Peptic ulcer prophylaxis: Famotidine     Radha Martin NP  Department of Hospital Medicine   [facility]

## 2017-02-01 NOTE — ASSESSMENT & PLAN NOTE
Subtherapeutic at this time with INR 1.2.  Will give additional 3mg coumadin dose at this time and continue with coumadin as scheduled monitoring daily INR.

## 2017-02-02 LAB
ANION GAP SERPL CALC-SCNC: 9 MMOL/L
BASOPHILS # BLD AUTO: 0 K/UL
BASOPHILS NFR BLD: 0 %
BUN SERPL-MCNC: 18 MG/DL
CALCIUM SERPL-MCNC: 8.2 MG/DL
CHLORIDE SERPL-SCNC: 104 MMOL/L
CO2 SERPL-SCNC: 24 MMOL/L
CREAT SERPL-MCNC: 0.9 MG/DL
DIFFERENTIAL METHOD: ABNORMAL
EOSINOPHIL # BLD AUTO: 0 K/UL
EOSINOPHIL NFR BLD: 0 %
ERYTHROCYTE [DISTWIDTH] IN BLOOD BY AUTOMATED COUNT: 15.7 %
EST. GFR  (AFRICAN AMERICAN): >60 ML/MIN/1.73 M^2
EST. GFR  (NON AFRICAN AMERICAN): >60 ML/MIN/1.73 M^2
GLUCOSE SERPL-MCNC: 123 MG/DL
HCT VFR BLD AUTO: 34.2 %
HGB BLD-MCNC: 11.1 G/DL
INR PPP: 1.8
LYMPHOCYTES # BLD AUTO: 0.8 K/UL
LYMPHOCYTES NFR BLD: 9.7 %
MCH RBC QN AUTO: 29.8 PG
MCHC RBC AUTO-ENTMCNC: 32.5 %
MCV RBC AUTO: 92 FL
MONOCYTES # BLD AUTO: 0.2 K/UL
MONOCYTES NFR BLD: 2.8 %
NEUTROPHILS # BLD AUTO: 7.7 K/UL
NEUTROPHILS NFR BLD: 87.5 %
PLATELET # BLD AUTO: 152 K/UL
PMV BLD AUTO: 8.1 FL
POTASSIUM SERPL-SCNC: 3.8 MMOL/L
PROTHROMBIN TIME: 18.7 SEC
RBC # BLD AUTO: 3.73 M/UL
SODIUM SERPL-SCNC: 137 MMOL/L
WBC # BLD AUTO: 8.8 K/UL

## 2017-02-02 PROCEDURE — 94761 N-INVAS EAR/PLS OXIMETRY MLT: CPT

## 2017-02-02 PROCEDURE — 25000003 PHARM REV CODE 250: Performed by: NURSE PRACTITIONER

## 2017-02-02 PROCEDURE — 80048 BASIC METABOLIC PNL TOTAL CA: CPT

## 2017-02-02 PROCEDURE — 85610 PROTHROMBIN TIME: CPT

## 2017-02-02 PROCEDURE — 36415 COLL VENOUS BLD VENIPUNCTURE: CPT

## 2017-02-02 PROCEDURE — 25000003 PHARM REV CODE 250: Performed by: INTERNAL MEDICINE

## 2017-02-02 PROCEDURE — 99226 PR SUBSEQUENT OBSERVATION CARE,LEVEL III: CPT | Mod: GW,,, | Performed by: INTERNAL MEDICINE

## 2017-02-02 PROCEDURE — G0378 HOSPITAL OBSERVATION PER HR: HCPCS

## 2017-02-02 PROCEDURE — 27000221 HC OXYGEN, UP TO 24 HOURS

## 2017-02-02 PROCEDURE — 25000242 PHARM REV CODE 250 ALT 637 W/ HCPCS: Performed by: NURSE PRACTITIONER

## 2017-02-02 PROCEDURE — 63600175 PHARM REV CODE 636 W HCPCS: Performed by: EMERGENCY MEDICINE

## 2017-02-02 PROCEDURE — 94640 AIRWAY INHALATION TREATMENT: CPT | Mod: 76

## 2017-02-02 PROCEDURE — 63600175 PHARM REV CODE 636 W HCPCS: Performed by: INTERNAL MEDICINE

## 2017-02-02 PROCEDURE — 85025 COMPLETE CBC W/AUTO DIFF WBC: CPT

## 2017-02-02 PROCEDURE — 25000003 PHARM REV CODE 250: Performed by: EMERGENCY MEDICINE

## 2017-02-02 RX ORDER — MOXIFLOXACIN HYDROCHLORIDE 400 MG/250ML
400 INJECTION, SOLUTION INTRAVENOUS
Status: DISCONTINUED | OUTPATIENT
Start: 2017-02-02 | End: 2017-02-03 | Stop reason: HOSPADM

## 2017-02-02 RX ORDER — METHYLPREDNISOLONE SOD SUCC 125 MG
80 VIAL (EA) INJECTION EVERY 12 HOURS
Status: DISCONTINUED | OUTPATIENT
Start: 2017-02-02 | End: 2017-02-03 | Stop reason: HOSPADM

## 2017-02-02 RX ORDER — RAMELTEON 8 MG/1
8 TABLET ORAL NIGHTLY PRN
Status: DISCONTINUED | OUTPATIENT
Start: 2017-02-02 | End: 2017-02-03 | Stop reason: HOSPADM

## 2017-02-02 RX ADMIN — GABAPENTIN 900 MG: 300 CAPSULE ORAL at 02:02

## 2017-02-02 RX ADMIN — FAMOTIDINE 20 MG: 20 TABLET, FILM COATED ORAL at 08:02

## 2017-02-02 RX ADMIN — IPRATROPIUM BROMIDE AND ALBUTEROL SULFATE 3 ML: .5; 3 SOLUTION RESPIRATORY (INHALATION) at 07:02

## 2017-02-02 RX ADMIN — SUCRALFATE 1 G: 1 TABLET ORAL at 05:02

## 2017-02-02 RX ADMIN — TOPIRAMATE 50 MG: 25 TABLET, FILM COATED ORAL at 08:02

## 2017-02-02 RX ADMIN — IPRATROPIUM BROMIDE AND ALBUTEROL SULFATE 3 ML: .5; 3 SOLUTION RESPIRATORY (INHALATION) at 08:02

## 2017-02-02 RX ADMIN — Medication 4.5 G: at 08:02

## 2017-02-02 RX ADMIN — GABAPENTIN 900 MG: 300 CAPSULE ORAL at 05:02

## 2017-02-02 RX ADMIN — IPRATROPIUM BROMIDE AND ALBUTEROL SULFATE 3 ML: .5; 3 SOLUTION RESPIRATORY (INHALATION) at 01:02

## 2017-02-02 RX ADMIN — FLUOXETINE HYDROCHLORIDE 40 MG: 20 CAPSULE ORAL at 08:02

## 2017-02-02 RX ADMIN — METHYLPREDNISOLONE SODIUM SUCCINATE 80 MG: 125 INJECTION, POWDER, FOR SOLUTION INTRAMUSCULAR; INTRAVENOUS at 05:02

## 2017-02-02 RX ADMIN — MOXIFLOXACIN HYDROCHLORIDE 400 MG: 400 INJECTION, SOLUTION INTRAVENOUS at 11:02

## 2017-02-02 RX ADMIN — GABAPENTIN 900 MG: 300 CAPSULE ORAL at 09:02

## 2017-02-02 RX ADMIN — DRONABINOL 5 MG: 2.5 CAPSULE ORAL at 05:02

## 2017-02-02 RX ADMIN — RAMELTEON 8 MG: 8 TABLET, FILM COATED ORAL at 08:02

## 2017-02-02 RX ADMIN — WARFARIN SODIUM 3 MG: 2 TABLET ORAL at 04:02

## 2017-02-02 RX ADMIN — DRONABINOL 5 MG: 2.5 CAPSULE ORAL at 04:02

## 2017-02-02 RX ADMIN — Medication 4.5 G: at 02:02

## 2017-02-02 RX ADMIN — NICOTINE 1 PATCH: 14 PATCH, EXTENDED RELEASE TRANSDERMAL at 08:02

## 2017-02-02 RX ADMIN — RAMELTEON 8 MG: 8 TABLET, FILM COATED ORAL at 01:02

## 2017-02-02 RX ADMIN — EMTRICITABINE 200 MG: 200 CAPSULE ORAL at 08:02

## 2017-02-02 RX ADMIN — METHYLPREDNISOLONE SODIUM SUCCINATE 80 MG: 125 INJECTION, POWDER, FOR SOLUTION INTRAMUSCULAR; INTRAVENOUS at 09:02

## 2017-02-02 RX ADMIN — TENOFOVIR DISOPROXIL FUMARATE 300 MG: 300 TABLET, COATED ORAL at 08:02

## 2017-02-02 RX ADMIN — SUCRALFATE 1 G: 1 TABLET ORAL at 12:02

## 2017-02-02 RX ADMIN — Medication 12.5 MG: at 08:02

## 2017-02-02 RX ADMIN — METHYLPREDNISOLONE SODIUM SUCCINATE 80 MG: 125 INJECTION, POWDER, FOR SOLUTION INTRAMUSCULAR; INTRAVENOUS at 11:02

## 2017-02-02 RX ADMIN — METHYLPREDNISOLONE SODIUM SUCCINATE 80 MG: 125 INJECTION, POWDER, FOR SOLUTION INTRAMUSCULAR; INTRAVENOUS at 12:02

## 2017-02-02 RX ADMIN — CLOPIDOGREL BISULFATE 75 MG: 75 TABLET ORAL at 08:02

## 2017-02-02 RX ADMIN — EFAVIRENZ 600 MG: 600 TABLET, FILM COATED ORAL at 08:02

## 2017-02-02 RX ADMIN — SUCRALFATE 1 G: 1 TABLET ORAL at 11:02

## 2017-02-02 NOTE — PROGRESS NOTES
"Progress Note  Hospital Medicine  Patient Name:Boni Lerner  MRN:  [id number]  Patient Class: OP- Observation  Admit Date: 2/1/2017  Length of Stay: 1 days  Expected Discharge Date:   Attending Physician: Analilia Nolasco MD  Primary Care Provider:  Gilberto Rosas MD    SUBJECTIVE:     Principal Problem: COPD with exacerbation  Initial history of present illness: Boni Lerner is a 71 y.o. Male with PMHx significant for COPD, HIV, and CAD. He was admitted to the service of hospital medicine with COPD exacerbation. He reported to the ED with complaint of SOB that worsened from its chronic state approximately 3 days ago and has progressed since that time to a point where he "couldn't take it anymore." The SOB was accompanied by a cough with clear sputum production and wheezing. He reported the SOB was worsened with activity and did not improve with "extra oxygen" from CPAP machine. He denies any fever, chills, nausea or vomiting. He stated he still receives hospice care and would like to continue with [facility], but felt he needed treatment to feel better as he is being baptized on Saturday. He continues to smoke about 1/2 ppd.     PMH/PSH/SH/FH/Meds: reviewed.    Symptoms/Review of Systems: Reports improvement in SOB, less wheezing. No chest pain or headache, fever or abdominal pain.     Diet:  Adequate intake.    Activity level: Normal.    Pain:  Patient reports no pain.       OBJECTIVE:   Vital Signs (Most Recent):      Temp: 97.6 °F (36.4 °C) (02/02/17 0814)  Pulse: 62 (02/02/17 0814)  Resp: 16 (02/02/17 0814)  BP: 118/70 (02/02/17 0814)  SpO2: 96 % (02/02/17 0814)       Vital Signs Range (Last 24H):  Temp:  [97.3 °F (36.3 °C)-98.3 °F (36.8 °C)]   Pulse:  [62-74]   Resp:  [14-18]   BP: (113-139)/(63-77)   SpO2:  [94 %-96 %]     Weight: 56.7 kg (125 lb)  Body mass index is 17.94 kg/(m^2).  No intake or output data in the 24 hours ending 02/02/17 1058  Physical Examination:  Constitutional: He is oriented to " person, place, and time.   Ill- appearing, frail.   HENT:   Head: Normocephalic and atraumatic.   Eyes: Conjunctivae and EOM are normal. Pupils are equal, round, and reactive to light.   Neck: Normal range of motion. Neck supple. No thyromegaly present.   Cardiovascular: Normal rate, regular rhythm and normal heart sounds.   Pulmonary/Chest: He is in respiratory distress (mild). He has wheezes. He exhibits no tenderness.   Increased respiratory rate and work of breathing   Abdominal: Soft. Bowel sounds are normal. He exhibits no distension. There is no tenderness.   Musculoskeletal: Normal range of motion. He exhibits no edema.   Neurological: He is alert and oriented to person, place, and time. No cranial nerve deficit.   Skin: Skin is warm and dry. He is not diaphoretic.   Psychiatric: He has a normal mood and affect. His behavior is normal. Judgment and thought content normal.     CBC:    Recent Labs  Lab 02/01/17  0405 02/02/17  0445   WBC 8.00 8.80   RBC 4.24* 3.73*   HGB 12.6* 11.1*   HCT 39.3* 34.2*    152   MCV 93 92   MCH 29.7 29.8   MCHC 32.0 32.5   BMP    Recent Labs  Lab 02/01/17  0406 02/02/17  0445   * 123*    137   K 3.5 3.8    104   CO2 30* 24   BUN 14 18   CREATININE 1.0 0.9   CALCIUM 9.1 8.2*     Lab Results   Component Value Date    INR 1.8 (H) 02/02/2017    INR 1.2 02/01/2017    INR 1.2 12/28/2016     Diagnostic Results:  Microbiology Results (last 7 days)     ** No results found for the last 168 hours. **      CXR: Increasing density and atelectasis at the left lung base may represent pneumonia in this patient with chronic lung changes suggesting COPD and fibrosis.  Atherosclerosis.    Assessment/Plan:   * COPD with exacerbation  With reported symptomatic improvement s/p nebulizer treatment and steroid administration. Supplemental O2 via nasal canula; titrate O2 saturation to >92%.   Reduce Solumedrol 80 mg IV q 12 hrs. Continue beta 2 agonist bronchodilator treatments.    Start IV Zosyn and Avelox for possible pneumonia. Repeat CXR in AM.   Monitor closely.     CAD (coronary artery disease)  Chronic, Without angina. Cardiac monitoring. Continue plavix, BBlockade.     HIV (human immunodeficiency virus infection)  Chronic, continue home regimen antiretroviral therapy.     Long term (current) use of anticoagulants  Continue Coumadin.     Tobacco abuse  Health hazards associated with cigarette smoking were reviewed with patient and cessation was encouraged. Nicotine replacement and counseling options were discussed. Patient does not wish to stop smoking.     Patient is currently under the care of [facility]. He wishes to remain DNR and discharge under the care of [facility]. He does not wish to have prolonged hospitalization and once he is stabilized would benefit from discharge into the care of [facility] with oral steroids and nebulizer treatments for home use.    VTE Risk Mitigation         Ordered     Medium Risk of VTE  Once  - Coumadin    02/01/17 0458        Analilia Nolasco MD  Department of Hospital Medicine   [facility]

## 2017-02-02 NOTE — PLAN OF CARE
02/01/17 1930   Vital Signs   SpO2 96 %   Pulse 74   Resp 16   All Fields Breath Sounds wheezes, expiratory   Positioning HOB elevated 45 degrees   O2 Device (Oxygen Therapy) nasal cannula   Flow (L/min) 3   Oxygen Concentration (%) 32   Respiratory Assessments   Cough Type good;nonproductive;loose   Aerosol Therapy   $ Treatment (Aerosol Therapy) aerosol neb given   SVN/Inhaler Treatment Route with oxygen;mask   Patient Tolerance good   Post-Treatment   Post-treatment Heart Rate (beats/min) 72   Post-treatment Resp Rate (breaths/min) 16   All Fields Breath Sounds unchanged

## 2017-02-02 NOTE — PLAN OF CARE
Problem: Patient Care Overview  Goal: Plan of Care Review  Outcome: Ongoing (interventions implemented as appropriate)  Alert and oriented. Shortness of breath on exertion noted. o2 2l nc in place.  Resp tx every 6 hours noted. Coughing up thick whitish mucous.   Reposition self in bed. Fall and injury free. Bed in low position and locked  Call bell in reach. Side rails up x 2.  Telemetry - NSR

## 2017-02-02 NOTE — PLAN OF CARE
Problem: Patient Care Overview  Goal: Plan of Care Review  Outcome: Ongoing (interventions implemented as appropriate)  Navid aerosol tx well BBS decreased O 2on @ 2L

## 2017-02-03 VITALS
WEIGHT: 125 LBS | OXYGEN SATURATION: 100 % | RESPIRATION RATE: 14 BRPM | DIASTOLIC BLOOD PRESSURE: 80 MMHG | TEMPERATURE: 97 F | HEIGHT: 70 IN | HEART RATE: 68 BPM | SYSTOLIC BLOOD PRESSURE: 144 MMHG | BODY MASS INDEX: 17.9 KG/M2

## 2017-02-03 LAB
ANION GAP SERPL CALC-SCNC: 9 MMOL/L
BASOPHILS # BLD AUTO: 0 K/UL
BASOPHILS NFR BLD: 0.1 %
BUN SERPL-MCNC: 27 MG/DL
CALCIUM SERPL-MCNC: 8.2 MG/DL
CHLORIDE SERPL-SCNC: 105 MMOL/L
CO2 SERPL-SCNC: 27 MMOL/L
CREAT SERPL-MCNC: 1 MG/DL
DIFFERENTIAL METHOD: ABNORMAL
EOSINOPHIL # BLD AUTO: 0 K/UL
EOSINOPHIL NFR BLD: 0 %
ERYTHROCYTE [DISTWIDTH] IN BLOOD BY AUTOMATED COUNT: 15.8 %
EST. GFR  (AFRICAN AMERICAN): >60 ML/MIN/1.73 M^2
EST. GFR  (NON AFRICAN AMERICAN): >60 ML/MIN/1.73 M^2
GLUCOSE SERPL-MCNC: 113 MG/DL
HCT VFR BLD AUTO: 35 %
HGB BLD-MCNC: 11 G/DL
INR PPP: 2.9
LYMPHOCYTES # BLD AUTO: 1.2 K/UL
LYMPHOCYTES NFR BLD: 9.3 %
MCH RBC QN AUTO: 29.3 PG
MCHC RBC AUTO-ENTMCNC: 31.5 %
MCV RBC AUTO: 93 FL
MONOCYTES # BLD AUTO: 0.4 K/UL
MONOCYTES NFR BLD: 3.4 %
NEUTROPHILS # BLD AUTO: 11 K/UL
NEUTROPHILS NFR BLD: 87.2 %
PLATELET # BLD AUTO: 167 K/UL
PMV BLD AUTO: 7.7 FL
POTASSIUM SERPL-SCNC: 4.3 MMOL/L
PROTHROMBIN TIME: 28.7 SEC
RBC # BLD AUTO: 3.76 M/UL
SODIUM SERPL-SCNC: 141 MMOL/L
WBC # BLD AUTO: 12.6 K/UL

## 2017-02-03 PROCEDURE — G0378 HOSPITAL OBSERVATION PER HR: HCPCS

## 2017-02-03 PROCEDURE — 27000221 HC OXYGEN, UP TO 24 HOURS

## 2017-02-03 PROCEDURE — 25000003 PHARM REV CODE 250: Performed by: EMERGENCY MEDICINE

## 2017-02-03 PROCEDURE — 36415 COLL VENOUS BLD VENIPUNCTURE: CPT

## 2017-02-03 PROCEDURE — 94640 AIRWAY INHALATION TREATMENT: CPT

## 2017-02-03 PROCEDURE — 25000242 PHARM REV CODE 250 ALT 637 W/ HCPCS: Performed by: NURSE PRACTITIONER

## 2017-02-03 PROCEDURE — 80048 BASIC METABOLIC PNL TOTAL CA: CPT

## 2017-02-03 PROCEDURE — 85025 COMPLETE CBC W/AUTO DIFF WBC: CPT

## 2017-02-03 PROCEDURE — 25000003 PHARM REV CODE 250: Performed by: INTERNAL MEDICINE

## 2017-02-03 PROCEDURE — 63600175 PHARM REV CODE 636 W HCPCS: Performed by: INTERNAL MEDICINE

## 2017-02-03 PROCEDURE — 94761 N-INVAS EAR/PLS OXIMETRY MLT: CPT

## 2017-02-03 PROCEDURE — 85610 PROTHROMBIN TIME: CPT

## 2017-02-03 PROCEDURE — 99217 PR OBSERVATION CARE DISCHARGE: CPT | Mod: GW,,, | Performed by: INTERNAL MEDICINE

## 2017-02-03 RX ORDER — DOXYCYCLINE 100 MG/1
100 CAPSULE ORAL 2 TIMES DAILY
Qty: 14 CAPSULE | Refills: 0 | Status: SHIPPED | OUTPATIENT
Start: 2017-02-03 | End: 2017-02-13

## 2017-02-03 RX ORDER — AZITHROMYCIN 500 MG/1
500 TABLET, FILM COATED ORAL DAILY
Qty: 5 TABLET | Refills: 0 | Status: SHIPPED | OUTPATIENT
Start: 2017-02-03 | End: 2017-02-08

## 2017-02-03 RX ORDER — METHYLPREDNISOLONE 4 MG/1
TABLET ORAL
Qty: 21 TABLET | Refills: 0 | Status: ON HOLD | OUTPATIENT
Start: 2017-02-03 | End: 2017-06-09

## 2017-02-03 RX ADMIN — FAMOTIDINE 20 MG: 20 TABLET, FILM COATED ORAL at 09:02

## 2017-02-03 RX ADMIN — SUCRALFATE 1 G: 1 TABLET ORAL at 05:02

## 2017-02-03 RX ADMIN — DRONABINOL 5 MG: 2.5 CAPSULE ORAL at 05:02

## 2017-02-03 RX ADMIN — IPRATROPIUM BROMIDE AND ALBUTEROL SULFATE 3 ML: .5; 3 SOLUTION RESPIRATORY (INHALATION) at 01:02

## 2017-02-03 RX ADMIN — CLOPIDOGREL BISULFATE 75 MG: 75 TABLET ORAL at 09:02

## 2017-02-03 RX ADMIN — METHYLPREDNISOLONE SODIUM SUCCINATE 80 MG: 125 INJECTION, POWDER, FOR SOLUTION INTRAMUSCULAR; INTRAVENOUS at 09:02

## 2017-02-03 RX ADMIN — TOPIRAMATE 50 MG: 25 TABLET, FILM COATED ORAL at 09:02

## 2017-02-03 RX ADMIN — NICOTINE 1 PATCH: 14 PATCH, EXTENDED RELEASE TRANSDERMAL at 09:02

## 2017-02-03 RX ADMIN — GABAPENTIN 900 MG: 300 CAPSULE ORAL at 05:02

## 2017-02-03 RX ADMIN — Medication 4.5 G: at 03:02

## 2017-02-03 RX ADMIN — IPRATROPIUM BROMIDE AND ALBUTEROL SULFATE 3 ML: .5; 3 SOLUTION RESPIRATORY (INHALATION) at 08:02

## 2017-02-03 RX ADMIN — SUCRALFATE 1 G: 1 TABLET ORAL at 12:02

## 2017-02-03 RX ADMIN — Medication 12.5 MG: at 09:02

## 2017-02-03 NOTE — PLAN OF CARE
Problem: Patient Care Overview  Goal: Plan of Care Review  Outcome: Ongoing (interventions implemented as appropriate)  Pt AAO, sudeep and cooperative throughout shift. resp tx q6h, tele monitoring ongoing, resp. Culture still needs to be collected, pt is coughing but cough nonproductive at this time. O2@3LNC. IV abx admin as ordered. Pt does not want to be discharged to hospice. He stated to this nurse that he wants to go home with HH. I advised him to let CM and nurse know tomorrow as CM is already gone for the day. VSS, NAD noted, all meds and POC reviewed with pt, pt verbalizes understanding room near nurses station, call light in reach.

## 2017-02-03 NOTE — PROGRESS NOTES
Pt. Denies Hospice care at this time, requesting HH services at discharge.  Disclosure for HH/DME signed and placed in Pt's blue folder.  Talked with Pt. And he is requesting home O2 and BSC.  Asked resp for home O2 eval.  Pt. Does have ride home, states he will call [facility] when Nursing is ready to d/c.  Updated [facility] Pt's nurse.

## 2017-02-03 NOTE — PROGRESS NOTES
"Pt left floor with nephew via wheelchair and O2 that nephew brought from home ( pt own home o2 and own wheelchair). Pt stated that he is going to go smoke. I advised pt that this is a nonsmoking facility and that it is very dangerous to smoke while O2 in use or even nearby. Pt then stated, " I am just going to get some fresh air then" pt left floor with nephew. Called Dr. Nolasco, notified of pt refusal to stay on unit.   "

## 2017-02-03 NOTE — PROGRESS NOTES
[facility] sent patient information (facesheet, orders, h&p, AVS, home O2 eval) to [facility] [phone].  Fax confirmation received.ZAINAB Ulloa    3:20pm Per Erika with [facility] [phone], home health will be [facility] and all DME from [facility].ZAINAB Ulloa

## 2017-02-03 NOTE — PLAN OF CARE
Problem: Patient Care Overview  Goal: Plan of Care Review  Outcome: Ongoing (interventions implemented as appropriate)  No acute distress noted Remains on antibiotics with no signs or symptoms of adverse reactions noted Remains on cardiac monitoring Safety maintained

## 2017-02-03 NOTE — PLAN OF CARE
Problem: Patient Care Overview  Goal: Plan of Care Review  Outcome: Ongoing (interventions implemented as appropriate)  Navid aerosol tx well BBS decreased O2 on @ 3L 98%

## 2017-02-03 NOTE — NURSING
Discharge instructions given to pt.  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

## 2017-02-03 NOTE — PLAN OF CARE
02/02/17 1940   Patient Assessment/Suction   Level of Consciousness (AVPU) alert   Respiratory Effort Normal;Unlabored   All Lung Fields Breath Sounds wheezes, expiratory   Cough Type good;nonproductive   PRE-TX-O2-ETCO2   O2 Device (Oxygen Therapy) nasal cannula   Flow (L/min) 3   Oxygen Concentration (%) 32   SpO2 96 %   Pulse Oximetry Type Intermittent   Pulse 72   Resp (!) 21   Aerosol Therapy   $ Aerosol Therapy Charges Aerosol Treatment   Respiratory Treatment Status given   SVN/Inhaler Treatment Route mask;with oxygen   Post-Treatment   Post-treatment Heart Rate (beats/min) 75   Post-treatment Resp Rate (breaths/min) 18   All Fields Breath Sounds wheezes, expiratory   Ready to Wean/Extubation Screen   FIO2<60 (chart decimal) 0.32

## 2017-02-03 NOTE — DISCHARGE SUMMARY
"Discharge Summary  Hospital Medicine    Admit Date: 2/1/2017    Date and Time: 2/3/197386:54 AM    Discharge Attending Physician: Analilia Nolasco MD    Primary Care Physician: Gilberto Rosas MD    Diagnoses:  Active Hospital Problems    Diagnosis  POA    *COPD with exacerbation [J44.1]  Yes    Long term (current) use of anticoagulants [Z79.01]  Not Applicable    Tobacco abuse [Z72.0]  Yes    CAD (coronary artery disease) [I25.10]  Yes    HIV (human immunodeficiency virus infection) [Z21]  Yes      Resolved Hospital Problems    Diagnosis Date Resolved POA   No resolved problems to display.     Discharged Condition: Good    Hospital Course:   Boni Lerner is a 71 y.o. Male with PMHx significant for COPD, HIV, and CAD. He was admitted to the service of hospital medicine with COPD exacerbation. He reported to the ED with complaint of SOB that worsened from its chronic state approximately 3 days ago and has progressed since that time to a point where he "couldn't take it anymore." The SOB was accompanied by a cough with clear sputum production and wheezing. He reported the SOB was worsened with activity and did not improve with "extra oxygen" from CPAP machine. He denied any fever, chills, nausea or vomiting. He stated he still receives hospice care and would like to continue with [facility], but felt he needed treatment to feel better as he is being baptized on Saturday. He continued to smoke about 1/2 ppd. Patient was admitted to Hospitalist medicine service. Patient was treated with IV antibiotics, IV Solumedrol therapy and beta 2 agonist nebulization treatments with improved symptoms. Patient was discharged home in stable condition with following discharge plan of care.     Consults: None    Significant Diagnostic Studies:     Microbiology Results (last 7 days)     Procedure Component Value Units Date/Time    Culture, Respiratory with Gram Stain [[id number]]     Order Status:  No result Specimen:  Respiratory  "      CXR: Increasing density and atelectasis at the left lung base may represent pneumonia in this patient with chronic lung changes suggesting COPD and fibrosis.  Atherosclerosis.    Special Treatments/Procedures: None  Disposition: Home or Self Care    Medications:  Reconciled Home Medications: Current Discharge Medication List      START taking these medications    Details   azithromycin (ZITHROMAX) 500 MG tablet Take 1 tablet (500 mg total) by mouth once daily.  Qty: 5 tablet, Refills: 0      doxycycline (VIBRAMYCIN) 100 MG Cap Take 1 capsule (100 mg total) by mouth 2 (two) times daily.  Qty: 14 capsule, Refills: 0      methylPREDNISolone (MEDROL DOSEPACK) 4 mg tablet follow package directions  Qty: 21 tablet, Refills: 0         CONTINUE these medications which have NOT CHANGED    Details   ondansetron (ZOFRAN-ODT) 4 MG TbDL Take 8 mg by mouth once.      tramadol (ULTRAM) 50 mg tablet Take 50 mg by mouth every 6 (six) hours as needed for Pain.      valacyclovir (VALTREX) 500 MG tablet Take 500 mg by mouth 2 (two) times daily.      acetaminophen (TYLENOL) 500 MG tablet Take 500 mg by mouth every 6 (six) hours as needed. 2 TABS      albuterol (ACCUNEB) 0.63 mg/3 mL Nebu Take 0.63 mg by nebulization 4 (four) times daily as needed.       albuterol-ipratropium 2.5mg-0.5mg/3mL (DUO-NEB) 0.5 mg-3 mg(2.5 mg base)/3 mL nebulizer solution Take 3 mLs by nebulization every 6 (six) hours as needed for Wheezing.  Qty: 25 vial, Refills: 0      budesonide-formoterol 80-4.5 mcg (SYMBICORT) 80-4.5 mcg/actuation HFAA Inhale 2 puffs into the lungs.      clopidogrel (PLAVIX) 75 mg tablet Take 75 mg by mouth once daily.      dronabinol (MARINOL) 5 MG capsule Take 5 mg by mouth 2 (two) times daily before meals.      efavirenz-emtrictabine-tenofovir 600-200-300 mg (ATRIPLA) 600-200-300 mg Tab Take 1 tablet by mouth every evening.       ergocalciferol (ERGOCALCIFEROL) 50,000 unit Cap Take 50,000 Units by mouth every 7 days. Takes on  Mondays      famotidine (PEPCID) 20 MG tablet Take 1 tablet (20 mg total) by mouth 2 (two) times daily.  Qty: 20 tablet, Refills: 1      fluoxetine (PROZAC) 40 MG capsule Take 40 mg by mouth nightly.       gabapentin (NEURONTIN) 800 MG tablet Take 900 mg by mouth 3 (three) times daily.       hydrocodone-acetaminophen (VICODIN ES) 7.5-300 mg Tab Take by mouth.      metoprolol tartrate (LOPRESSOR) 25 MG tablet 12.5 mg 2 (two) times daily.   Refills: 3      morphine (MSIR) 15 MG tablet Take 15 mg by mouth every 2 (two) hours as needed for Pain.      nitroGLYCERIN (NITROSTAT) 0.3 MG SL tablet Place 0.3 mg under the tongue every 5 (five) minutes as needed for Chest pain.      sucralfate (CARAFATE) 1 gram tablet Take 1 tablet (1 g total) by mouth 4 (four) times daily.  Qty: 30 tablet, Refills: 0      tiotropium (SPIRIVA) 18 mcg inhalation capsule Inhale 18 mcg into the lungs once daily.      topiramate (TOPAMAX) 50 MG tablet Take 50 mg by mouth 2 (two) times daily. 2 TABS HS      warfarin (COUMADIN) 2.5 MG tablet Take 3 mg by mouth Daily.   Refills: 2             Discharge Procedure Orders  Ambulatory Referral to Hospice   Referral Priority: Routine Referral Type: Hospice   Referral Reason: Specialty Services Required    Requested Specialty: Hospice and Palliative Medicine    Number of Visits Requested: 1      Diet general   Order Comments: Cardiac/ 2 gram sodium low cholesterol diet     Other restrictions (specify):   Order Comments: Fall precautions     Call MD for:   Order Comments: For worsening symptoms, chest pain, shortness of breath, increased abdominal pain, high grade fever, stroke or stroke like symptoms, immediately go to the nearest Emergency Room or call 911 as soon as possible.       Follow-up Information     Follow up with Gilberto Rosas MD On 2/16/2017.    Specialty:  Family Medicine    Why:  @9:00am     Contact information:    [address]  [place] [id number]  [phone]          Please follow up.     Contact information:    Please stop smoking.         Please follow up.    Contact information:    Resume INR monitoring as before

## 2017-02-03 NOTE — SIGNIFICANT EVENT
Home Oxygen evaluation    O2 on @ 3L resting in bed  saturation 96%    Saturation off O2 on room air 86%  Resting in bed    Recovered on 2L Nasal cannula  95% Resting in bed

## 2017-06-07 ENCOUNTER — HOSPITAL ENCOUNTER (OUTPATIENT)
Facility: HOSPITAL | Age: 72
LOS: 2 days | Discharge: HOME HEALTH | End: 2017-06-09
Attending: EMERGENCY MEDICINE | Admitting: INTERNAL MEDICINE
Payer: MEDICARE

## 2017-06-07 DIAGNOSIS — J44.1 COPD WITH EXACERBATION: ICD-10-CM

## 2017-06-07 DIAGNOSIS — I49.9 ARRHYTHMIA: ICD-10-CM

## 2017-06-07 DIAGNOSIS — J44.1 COPD WITH ACUTE EXACERBATION: Primary | ICD-10-CM

## 2017-06-07 DIAGNOSIS — R47.1 DYSARTHRIA: ICD-10-CM

## 2017-06-07 DIAGNOSIS — T45.511A COUMADIN TOXICITY, ACCIDENTAL OR UNINTENTIONAL, INITIAL ENCOUNTER: ICD-10-CM

## 2017-06-07 DIAGNOSIS — I25.10 CORONARY ARTERY DISEASE, ANGINA PRESENCE UNSPECIFIED, UNSPECIFIED VESSEL OR LESION TYPE, UNSPECIFIED WHETHER NATIVE OR TRANSPLANTED HEART: ICD-10-CM

## 2017-06-07 DIAGNOSIS — H49.9: ICD-10-CM

## 2017-06-07 DIAGNOSIS — R47.81 SLURRING OF SPEECH: ICD-10-CM

## 2017-06-07 DIAGNOSIS — H53.8 BLURRED VISION: ICD-10-CM

## 2017-06-07 DIAGNOSIS — B20 HIV (HUMAN IMMUNODEFICIENCY VIRUS INFECTION): ICD-10-CM

## 2017-06-07 PROBLEM — E44.0 MODERATE MALNUTRITION: Status: ACTIVE | Noted: 2017-06-07

## 2017-06-07 LAB
ALBUMIN SERPL BCP-MCNC: 3.1 G/DL
ALP SERPL-CCNC: 146 U/L
ALT SERPL W/O P-5'-P-CCNC: 10 U/L
ANION GAP SERPL CALC-SCNC: 5 MMOL/L
APTT BLDCRRT: 85.1 SEC
AST SERPL-CCNC: 16 U/L
BASOPHILS # BLD AUTO: 0 K/UL
BASOPHILS NFR BLD: 0.3 %
BILIRUB SERPL-MCNC: 0.3 MG/DL
BUN SERPL-MCNC: 20 MG/DL
CALCIUM SERPL-MCNC: 8.8 MG/DL
CHLORIDE SERPL-SCNC: 106 MMOL/L
CHOLEST/HDLC SERPL: 3.3 {RATIO}
CO2 SERPL-SCNC: 29 MMOL/L
CREAT SERPL-MCNC: 1.1 MG/DL
DIFFERENTIAL METHOD: ABNORMAL
EOSINOPHIL # BLD AUTO: 0.4 K/UL
EOSINOPHIL NFR BLD: 3.8 %
ERYTHROCYTE [DISTWIDTH] IN BLOOD BY AUTOMATED COUNT: 15.8 %
EST. GFR  (AFRICAN AMERICAN): >60 ML/MIN/1.73 M^2
EST. GFR  (NON AFRICAN AMERICAN): >60 ML/MIN/1.73 M^2
FOLATE SERPL-MCNC: 3.9 NG/ML
GLUCOSE SERPL-MCNC: 94 MG/DL
HCT VFR BLD AUTO: 37.5 %
HDL/CHOLESTEROL RATIO: 30.1 %
HDLC SERPL-MCNC: 136 MG/DL
HDLC SERPL-MCNC: 41 MG/DL
HGB BLD-MCNC: 12.2 G/DL
INR PPP: >10
INR PPP: >10
LDLC SERPL CALC-MCNC: 71.2 MG/DL
LYMPHOCYTES # BLD AUTO: 2.3 K/UL
LYMPHOCYTES NFR BLD: 23.7 %
MCH RBC QN AUTO: 33 PG
MCHC RBC AUTO-ENTMCNC: 32.6 %
MCV RBC AUTO: 101 FL
MONOCYTES # BLD AUTO: 0.5 K/UL
MONOCYTES NFR BLD: 5.3 %
NEUTROPHILS # BLD AUTO: 6.4 K/UL
NEUTROPHILS NFR BLD: 66.9 %
NONHDLC SERPL-MCNC: 95 MG/DL
PLATELET # BLD AUTO: 156 K/UL
PMV BLD AUTO: 8.4 FL
POTASSIUM SERPL-SCNC: 4.3 MMOL/L
PROT SERPL-MCNC: 7 G/DL
PROTHROMBIN TIME: 99.8 SEC
PROTHROMBIN TIME: >100 SEC
RBC # BLD AUTO: 3.7 M/UL
SODIUM SERPL-SCNC: 140 MMOL/L
TRIGL SERPL-MCNC: 119 MG/DL
VIT B12 SERPL-MCNC: 290 PG/ML
WBC # BLD AUTO: 9.6 K/UL

## 2017-06-07 PROCEDURE — 12000002 HC ACUTE/MED SURGE SEMI-PRIVATE ROOM

## 2017-06-07 PROCEDURE — 85610 PROTHROMBIN TIME: CPT

## 2017-06-07 PROCEDURE — 36415 COLL VENOUS BLD VENIPUNCTURE: CPT

## 2017-06-07 PROCEDURE — G0378 HOSPITAL OBSERVATION PER HR: HCPCS

## 2017-06-07 PROCEDURE — 99285 EMERGENCY DEPT VISIT HI MDM: CPT | Mod: 25

## 2017-06-07 PROCEDURE — 96365 THER/PROPH/DIAG IV INF INIT: CPT | Mod: 59

## 2017-06-07 PROCEDURE — 25000003 PHARM REV CODE 250: Performed by: EMERGENCY MEDICINE

## 2017-06-07 PROCEDURE — 99223 1ST HOSP IP/OBS HIGH 75: CPT | Mod: ,,, | Performed by: INTERNAL MEDICINE

## 2017-06-07 PROCEDURE — 80053 COMPREHEN METABOLIC PANEL: CPT

## 2017-06-07 PROCEDURE — G8997 SWALLOW GOAL STATUS: HCPCS | Mod: CI

## 2017-06-07 PROCEDURE — G8998 SWALLOW D/C STATUS: HCPCS | Mod: CI

## 2017-06-07 PROCEDURE — 96375 TX/PRO/DX INJ NEW DRUG ADDON: CPT

## 2017-06-07 PROCEDURE — 63600175 PHARM REV CODE 636 W HCPCS: Performed by: EMERGENCY MEDICINE

## 2017-06-07 PROCEDURE — 27000221 HC OXYGEN, UP TO 24 HOURS

## 2017-06-07 PROCEDURE — 85610 PROTHROMBIN TIME: CPT | Mod: 91

## 2017-06-07 PROCEDURE — A9585 GADOBUTROL INJECTION: HCPCS | Performed by: INTERNAL MEDICINE

## 2017-06-07 PROCEDURE — 92610 EVALUATE SWALLOWING FUNCTION: CPT

## 2017-06-07 PROCEDURE — 80061 LIPID PANEL: CPT

## 2017-06-07 PROCEDURE — C9113 INJ PANTOPRAZOLE SODIUM, VIA: HCPCS | Performed by: EMERGENCY MEDICINE

## 2017-06-07 PROCEDURE — 85025 COMPLETE CBC W/AUTO DIFF WBC: CPT

## 2017-06-07 PROCEDURE — 99900035 HC TECH TIME PER 15 MIN (STAT)

## 2017-06-07 PROCEDURE — 94640 AIRWAY INHALATION TREATMENT: CPT

## 2017-06-07 PROCEDURE — 25000003 PHARM REV CODE 250: Performed by: INTERNAL MEDICINE

## 2017-06-07 PROCEDURE — 93005 ELECTROCARDIOGRAM TRACING: CPT

## 2017-06-07 PROCEDURE — 82746 ASSAY OF FOLIC ACID SERUM: CPT

## 2017-06-07 PROCEDURE — 97802 MEDICAL NUTRITION INDIV IN: CPT

## 2017-06-07 PROCEDURE — 82607 VITAMIN B-12: CPT

## 2017-06-07 PROCEDURE — 25000242 PHARM REV CODE 250 ALT 637 W/ HCPCS: Performed by: EMERGENCY MEDICINE

## 2017-06-07 PROCEDURE — 93010 ELECTROCARDIOGRAM REPORT: CPT | Mod: S$GLB,,, | Performed by: INTERNAL MEDICINE

## 2017-06-07 PROCEDURE — 25500020 PHARM REV CODE 255: Performed by: INTERNAL MEDICINE

## 2017-06-07 PROCEDURE — 85730 THROMBOPLASTIN TIME PARTIAL: CPT

## 2017-06-07 PROCEDURE — G8996 SWALLOW CURRENT STATUS: HCPCS | Mod: CI

## 2017-06-07 RX ORDER — GADOBUTROL 604.72 MG/ML
5 INJECTION INTRAVENOUS
Status: COMPLETED | OUTPATIENT
Start: 2017-06-07 | End: 2017-06-07

## 2017-06-07 RX ORDER — SENNOSIDES 8.6 MG/1
8.6 TABLET ORAL DAILY
Status: DISCONTINUED | OUTPATIENT
Start: 2017-06-08 | End: 2017-06-09 | Stop reason: HOSPADM

## 2017-06-07 RX ORDER — IBUPROFEN 200 MG
1 TABLET ORAL DAILY
Status: DISCONTINUED | OUTPATIENT
Start: 2017-06-07 | End: 2017-06-09 | Stop reason: HOSPADM

## 2017-06-07 RX ORDER — TOPIRAMATE 25 MG/1
50 TABLET ORAL 2 TIMES DAILY
Status: DISCONTINUED | OUTPATIENT
Start: 2017-06-07 | End: 2017-06-09 | Stop reason: HOSPADM

## 2017-06-07 RX ORDER — FLUOXETINE HYDROCHLORIDE 20 MG/1
40 CAPSULE ORAL NIGHTLY
Status: DISCONTINUED | OUTPATIENT
Start: 2017-06-07 | End: 2017-06-09 | Stop reason: HOSPADM

## 2017-06-07 RX ORDER — TENOFOVIR DISOPROXIL FUMARATE 300 MG/1
300 TABLET, FILM COATED ORAL NIGHTLY
Status: DISCONTINUED | OUTPATIENT
Start: 2017-06-07 | End: 2017-06-09 | Stop reason: HOSPADM

## 2017-06-07 RX ORDER — MOXIFLOXACIN HYDROCHLORIDE 400 MG/250ML
400 INJECTION, SOLUTION INTRAVENOUS
Status: COMPLETED | OUTPATIENT
Start: 2017-06-07 | End: 2017-06-07

## 2017-06-07 RX ORDER — EFAVIRENZ 600 MG/1
600 TABLET, FILM COATED ORAL NIGHTLY
Status: DISCONTINUED | OUTPATIENT
Start: 2017-06-07 | End: 2017-06-09 | Stop reason: HOSPADM

## 2017-06-07 RX ORDER — CLOPIDOGREL BISULFATE 75 MG/1
75 TABLET ORAL DAILY
Status: DISCONTINUED | OUTPATIENT
Start: 2017-06-07 | End: 2017-06-09 | Stop reason: HOSPADM

## 2017-06-07 RX ORDER — EMTRICITABINE 200 MG/1
200 CAPSULE ORAL NIGHTLY
Status: DISCONTINUED | OUTPATIENT
Start: 2017-06-07 | End: 2017-06-09 | Stop reason: HOSPADM

## 2017-06-07 RX ORDER — METHYLPREDNISOLONE SOD SUCC 125 MG
80 VIAL (EA) INJECTION
Status: COMPLETED | OUTPATIENT
Start: 2017-06-07 | End: 2017-06-07

## 2017-06-07 RX ORDER — VALACYCLOVIR HYDROCHLORIDE 500 MG/1
500 TABLET, FILM COATED ORAL 2 TIMES DAILY
Status: DISCONTINUED | OUTPATIENT
Start: 2017-06-07 | End: 2017-06-09 | Stop reason: HOSPADM

## 2017-06-07 RX ORDER — METOPROLOL TARTRATE 25 MG/1
12.5 TABLET ORAL 2 TIMES DAILY
Status: DISCONTINUED | OUTPATIENT
Start: 2017-06-07 | End: 2017-06-09 | Stop reason: HOSPADM

## 2017-06-07 RX ORDER — IPRATROPIUM BROMIDE AND ALBUTEROL SULFATE 2.5; .5 MG/3ML; MG/3ML
3 SOLUTION RESPIRATORY (INHALATION) EVERY 4 HOURS PRN
Status: DISCONTINUED | OUTPATIENT
Start: 2017-06-07 | End: 2017-06-09 | Stop reason: HOSPADM

## 2017-06-07 RX ORDER — FLUTICASONE FUROATE AND VILANTEROL 100; 25 UG/1; UG/1
1 POWDER RESPIRATORY (INHALATION) DAILY
Status: DISCONTINUED | OUTPATIENT
Start: 2017-06-07 | End: 2017-06-07

## 2017-06-07 RX ORDER — EFAVIRENZ, EMTRICITABINE AND TENOFOVIR DISOPROXIL FUMARATE 600; 200; 300 MG/1; MG/1; MG/1
1 TABLET, FILM COATED ORAL NIGHTLY
Status: DISCONTINUED | OUTPATIENT
Start: 2017-06-07 | End: 2017-06-07

## 2017-06-07 RX ORDER — PANTOPRAZOLE SODIUM 40 MG/10ML
40 INJECTION, POWDER, LYOPHILIZED, FOR SOLUTION INTRAVENOUS DAILY
Status: DISCONTINUED | OUTPATIENT
Start: 2017-06-07 | End: 2017-06-09 | Stop reason: HOSPADM

## 2017-06-07 RX ORDER — PHYTONADIONE 5 MG/1
10 TABLET ORAL ONCE
Status: COMPLETED | OUTPATIENT
Start: 2017-06-07 | End: 2017-06-07

## 2017-06-07 RX ORDER — METHYLPREDNISOLONE SOD SUCC 125 MG
80 VIAL (EA) INJECTION EVERY 8 HOURS
Status: DISCONTINUED | OUTPATIENT
Start: 2017-06-07 | End: 2017-06-08

## 2017-06-07 RX ORDER — SODIUM CHLORIDE 0.9 % (FLUSH) 0.9 %
3 SYRINGE (ML) INJECTION EVERY 8 HOURS
Status: DISCONTINUED | OUTPATIENT
Start: 2017-06-07 | End: 2017-06-09 | Stop reason: HOSPADM

## 2017-06-07 RX ORDER — ALBUTEROL SULFATE 2.5 MG/.5ML
10 SOLUTION RESPIRATORY (INHALATION)
Status: COMPLETED | OUTPATIENT
Start: 2017-06-07 | End: 2017-06-07

## 2017-06-07 RX ORDER — GABAPENTIN 300 MG/1
900 CAPSULE ORAL 3 TIMES DAILY
Status: DISCONTINUED | OUTPATIENT
Start: 2017-06-07 | End: 2017-06-09 | Stop reason: HOSPADM

## 2017-06-07 RX ORDER — IPRATROPIUM BROMIDE 0.5 MG/2.5ML
0.5 SOLUTION RESPIRATORY (INHALATION)
Status: COMPLETED | OUTPATIENT
Start: 2017-06-07 | End: 2017-06-07

## 2017-06-07 RX ORDER — TRAMADOL HYDROCHLORIDE 50 MG/1
50 TABLET ORAL EVERY 6 HOURS PRN
Status: DISCONTINUED | OUTPATIENT
Start: 2017-06-07 | End: 2017-06-09 | Stop reason: HOSPADM

## 2017-06-07 RX ORDER — CYPROHEPTADINE HYDROCHLORIDE 4 MG/1
4 TABLET ORAL 3 TIMES DAILY PRN
Status: DISCONTINUED | OUTPATIENT
Start: 2017-06-07 | End: 2017-06-09 | Stop reason: HOSPADM

## 2017-06-07 RX ORDER — FAMOTIDINE 20 MG/1
20 TABLET, FILM COATED ORAL 2 TIMES DAILY
Status: DISCONTINUED | OUTPATIENT
Start: 2017-06-07 | End: 2017-06-09 | Stop reason: HOSPADM

## 2017-06-07 RX ORDER — LABETALOL HYDROCHLORIDE 5 MG/ML
10 INJECTION, SOLUTION INTRAVENOUS
Status: DISCONTINUED | OUTPATIENT
Start: 2017-06-07 | End: 2017-06-09 | Stop reason: HOSPADM

## 2017-06-07 RX ORDER — SUCRALFATE 1 G/1
1 TABLET ORAL 4 TIMES DAILY
Status: DISCONTINUED | OUTPATIENT
Start: 2017-06-07 | End: 2017-06-09 | Stop reason: HOSPADM

## 2017-06-07 RX ORDER — WARFARIN 2.5 MG/1
2.5 TABLET ORAL DAILY
Status: DISCONTINUED | OUTPATIENT
Start: 2017-06-07 | End: 2017-06-07 | Stop reason: DRUGHIGH

## 2017-06-07 RX ORDER — ERGOCALCIFEROL 1.25 MG/1
50000 CAPSULE ORAL
Status: DISCONTINUED | OUTPATIENT
Start: 2017-06-07 | End: 2017-06-09 | Stop reason: HOSPADM

## 2017-06-07 RX ORDER — GADOBUTROL 604.72 MG/ML
INJECTION INTRAVENOUS
Status: DISPENSED
Start: 2017-06-07 | End: 2017-06-08

## 2017-06-07 RX ORDER — MOXIFLOXACIN HYDROCHLORIDE 400 MG/250ML
400 INJECTION, SOLUTION INTRAVENOUS
Status: COMPLETED | OUTPATIENT
Start: 2017-06-08 | End: 2017-06-08

## 2017-06-07 RX ORDER — PHYTONADIONE 5 MG/1
10 TABLET ORAL ONCE
Status: COMPLETED | OUTPATIENT
Start: 2017-06-08 | End: 2017-06-08

## 2017-06-07 RX ORDER — CYPROHEPTADINE HYDROCHLORIDE 4 MG/1
4 TABLET ORAL 3 TIMES DAILY PRN
Status: ON HOLD | COMMUNITY
End: 2017-08-30 | Stop reason: ALTCHOICE

## 2017-06-07 RX ORDER — IPRATROPIUM BROMIDE AND ALBUTEROL SULFATE 2.5; .5 MG/3ML; MG/3ML
3 SOLUTION RESPIRATORY (INHALATION) EVERY 4 HOURS PRN
Status: DISCONTINUED | OUTPATIENT
Start: 2017-06-07 | End: 2017-06-07

## 2017-06-07 RX ORDER — GABAPENTIN 800 MG/1
900 TABLET ORAL 3 TIMES DAILY
Status: DISCONTINUED | OUTPATIENT
Start: 2017-06-07 | End: 2017-06-07

## 2017-06-07 RX ORDER — FLUTICASONE FUROATE AND VILANTEROL 100; 25 UG/1; UG/1
1 POWDER RESPIRATORY (INHALATION) DAILY
Status: DISCONTINUED | OUTPATIENT
Start: 2017-06-07 | End: 2017-06-09 | Stop reason: HOSPADM

## 2017-06-07 RX ORDER — ONDANSETRON 4 MG/1
8 TABLET, ORALLY DISINTEGRATING ORAL ONCE
Status: COMPLETED | OUTPATIENT
Start: 2017-06-07 | End: 2017-06-07

## 2017-06-07 RX ADMIN — EFAVIRENZ 600 MG: 600 TABLET, FILM COATED ORAL at 08:06

## 2017-06-07 RX ADMIN — Medication 12.5 MG: at 02:06

## 2017-06-07 RX ADMIN — FAMOTIDINE 20 MG: 20 TABLET ORAL at 01:06

## 2017-06-07 RX ADMIN — TOPIRAMATE 50 MG: 25 TABLET, FILM COATED ORAL at 08:06

## 2017-06-07 RX ADMIN — PANTOPRAZOLE SODIUM 40 MG: 40 INJECTION, POWDER, FOR SOLUTION INTRAVENOUS at 02:06

## 2017-06-07 RX ADMIN — FAMOTIDINE 20 MG: 20 TABLET ORAL at 08:06

## 2017-06-07 RX ADMIN — SODIUM CHLORIDE, PRESERVATIVE FREE 3 ML: 5 INJECTION INTRAVENOUS at 10:06

## 2017-06-07 RX ADMIN — GABAPENTIN 900 MG: 300 CAPSULE ORAL at 01:06

## 2017-06-07 RX ADMIN — NICOTINE 1 PATCH: 21 PATCH, EXTENDED RELEASE TRANSDERMAL at 02:06

## 2017-06-07 RX ADMIN — Medication 12.5 MG: at 08:06

## 2017-06-07 RX ADMIN — ONDANSETRON 8 MG: 4 TABLET, ORALLY DISINTEGRATING ORAL at 02:06

## 2017-06-07 RX ADMIN — VALACYCLOVIR HYDROCHLORIDE 500 MG: 500 TABLET, FILM COATED ORAL at 08:06

## 2017-06-07 RX ADMIN — CLOPIDOGREL BISULFATE 75 MG: 75 TABLET ORAL at 02:06

## 2017-06-07 RX ADMIN — IPRATROPIUM BROMIDE 0.5 MG: 0.5 SOLUTION RESPIRATORY (INHALATION) at 09:06

## 2017-06-07 RX ADMIN — SUCRALFATE 1 G: 1 TABLET ORAL at 02:06

## 2017-06-07 RX ADMIN — TENOFOVIR DISOPROXIL FUMARATE 300 MG: 300 TABLET, COATED ORAL at 08:06

## 2017-06-07 RX ADMIN — SODIUM CHLORIDE, PRESERVATIVE FREE 3 ML: 5 INJECTION INTRAVENOUS at 02:06

## 2017-06-07 RX ADMIN — ALBUTEROL SULFATE 10 MG: 2.5 SOLUTION RESPIRATORY (INHALATION) at 09:06

## 2017-06-07 RX ADMIN — EMTRICITABINE 200 MG: 200 CAPSULE ORAL at 08:06

## 2017-06-07 RX ADMIN — METHYLPREDNISOLONE SODIUM SUCCINATE 80 MG: 125 INJECTION, POWDER, FOR SOLUTION INTRAMUSCULAR; INTRAVENOUS at 08:06

## 2017-06-07 RX ADMIN — GADOBUTROL 5 ML: 604.72 INJECTION INTRAVENOUS at 04:06

## 2017-06-07 RX ADMIN — FLUOXETINE 40 MG: 20 CAPSULE ORAL at 08:06

## 2017-06-07 RX ADMIN — MOXIFLOXACIN HYDROCHLORIDE 400 MG: 400 INJECTION, SOLUTION INTRAVENOUS at 10:06

## 2017-06-07 RX ADMIN — ERGOCALCIFEROL 50000 UNITS: 1.25 CAPSULE ORAL at 02:06

## 2017-06-07 RX ADMIN — PHYTONADIONE 10 MG: 5 TABLET ORAL at 05:06

## 2017-06-07 RX ADMIN — GABAPENTIN 900 MG: 300 CAPSULE ORAL at 10:06

## 2017-06-07 RX ADMIN — TOPIRAMATE 50 MG: 25 TABLET, FILM COATED ORAL at 02:06

## 2017-06-07 RX ADMIN — SUCRALFATE 1 G: 1 TABLET ORAL at 05:06

## 2017-06-07 RX ADMIN — VALACYCLOVIR HYDROCHLORIDE 500 MG: 500 TABLET, FILM COATED ORAL at 01:06

## 2017-06-07 RX ADMIN — METHYLPREDNISOLONE SODIUM SUCCINATE 80 MG: 125 INJECTION, POWDER, FOR SOLUTION INTRAMUSCULAR; INTRAVENOUS at 09:06

## 2017-06-07 NOTE — CONSULTS
[facility]  Adult Nutrition  Consult Note    SUMMARY     Recommendations    Recommendation/Intervention: 1) If PO intake is appropriate, advance per pt tolerance to cardiac diet with texture per SLP with Boost Plus VS     2) If pt is not able to tolerate PO intake, recommend Isosource 1.5 @ 10 mls/hr to increase by 10 mls q 6 hrs to goal rate of 1800 calories, 81 gms protein, 934 mls free fluid. Flush with 150 mls q 4 hrs for hydration. Monitor residuals and hold if >250 mls.     3) If PPN desired, recommend Clinimix E @ 125 mls/hr with 20% fat emulsions (250 mls) to provide 1004 calories, 50 gms protein, 1800 mls fluid, with GIR 1.15.  Monitor triglycerides and hold if >400.  Goals: Provide nutrition within 96 hrs.   Nutrition Goal Status: new  Communication of RD Recs:  (sticky note, answer to consult)    Continuum of Care Plan    Referral to Outpatient Services:  (Discharge Plan: pending clinical course)    Reason for Assessment    Reason for Assessment: physician consult      1. COPD with acute exacerbation    2. Blurred vision    3. Ocular palsy of right eye    4. Dysarthria      Past Medical History:   Diagnosis Date    Arthritis     Asbestos exposure     COPD (chronic obstructive pulmonary disease)     O2 AT NITE PRN    Coronary artery disease     STENT X 1    Depression     HIV (human immunodeficiency virus infection)     Hypertension     NO MED NOW    Migraines     Myocardial infarction     Presence of dental bridge     UPPER AND LOWER    RLS (restless legs syndrome)     Wears glasses       Interdisciplinary Rounds: did not attend     General Information Comments: Admitted with SOB x 1-2 weeks, COPD.  Diet is NPO until evaluated by SLP. Pt reports he is very hungry. Minimal wt loss since 2/1/17. Dropped from 56.7 kg to 54.3 kg (4% x 4 months)    Nutrition Prescription Ordered    Current Diet Order: NPO  Nutrition Order Comments: pending swallow eval     Evaluation of  "Received Nutrients/Fluid Intake  Energy Calories Required: not meeting needs  Protein Required: not meeting needs  Fluid Required: not meeting needs no continuous IVF   No intake/output data recorded.     Nutrition Risk Screen     Nutrition Risk Screen: reduced oral intake over the last month, dysphagia or difficulty swallowing, unintentional loss of 10 lbs or more in the past 2 mos    Nutrition/Diet History    Patient Reported Diet/Restrictions/Preferences: general  Food Preferences: No cultural or religous preferences identified.      Labs/Tests/Procedures/Meds    Diagnostic Test/Procedure Review: reviewed, pertinent  Pertinent Labs Reviewed: reviewed, pertinent      BMP  Lab Results   Component Value Date     06/07/2017    K 4.3 06/07/2017     06/07/2017    CO2 29 06/07/2017    BUN 20 06/07/2017    CREATININE 1.1 06/07/2017    CALCIUM 8.8 06/07/2017    ANIONGAP 5 (L) 06/07/2017    ESTGFRAFRICA >60 06/07/2017    EGFRNONAA >60 06/07/2017     No results for input(s): POCTGLUCOSE in the last 24 hours.   No results found for: LABA1C, HGBA1C (pending)    Lab Results   Component Value Date    CHOL 136 06/07/2017    CHOL 158 04/06/2014     Lab Results   Component Value Date    HDL 41 06/07/2017    HDL 46 04/06/2014    HDL 43 07/22/2013     Lab Results   Component Value Date    LDLCALC 71.2 06/07/2017    LDLCALC 97.4 04/06/2014    LDLCALC 83 07/22/2013     Lab Results   Component Value Date    TRIG 119 06/07/2017    TRIG 73 04/06/2014     Lab Results   Component Value Date    CHOLHDL 30.1 06/07/2017    CHOLHDL 29.1 04/06/2014       Pertinent Medications Reviewed: reviewed, pertinent       Physical Findings    Overall Physical Appearance: loss of muscle mass, loss of subcutaneous fat     Oral/Mouth Cavity: dental applicance present (specify)  Skin:  (not available. <24 hrs admit)    Anthropometrics    Temp: 97.8 °F (36.6 °C)     Height: 5' 11"  Weight Method: Bed Scale  Weight: 54.3 kg (119 lb 9.6 oz)  Ideal " Body Weight (IBW), Male: 172 lb     % Ideal Body Weight, Male (lb): 69.53 lb     BMI (Calculated): 16.7  BMI Grade: 16 - 16.9 protein-energy malnutrition grade II  Usual Body Weight (UBW), k.7 kg (4% loss x 4 months(chart review 17))   Weight Loss: unintentional     Estimated/Assessed Needs    Weight Used For Calorie Calculations: 54.3 kg (119 lb 11.4 oz)   Height (cm): 180.3 cm  Energy Need Method: Kcal/kg (2650-0993 (30-35 kcal/kg for wt gain))   Weight Used For Protein Calculations: 54.3 kg (119 lb 11.4 oz) (65.3-108.8 gms (1.2-2.0 gm/kg/day))  Fluid Need Method: RDA Method (or per MD rec)  CHO Requirement: not required     Assessment and Plan    Moderate malnutrition    Nutrition Problem:   Moderate Malnutrition    % Intake of Estimated Energy Needs: 0 - 25 %  % Meal Intake: NPO  Malnutrition Reason: Illness Related  Moderate Weight Loss Chronic Illness: Other: Unintentional 4% wt loss x 4 months    Etiology/Related to:   Illness related as evidenced by: 1) Underweight with BMI 16.72 2) Fat loss noted around triceps 3) Muscle loss with protruding clavicle and hollow around orbitals.      Treatment Strategy:   Recommend : 1) If PO intake is appropriate, advance per pt tolerance to cardiac diet with texture per SLP with Boost Plus VS     2) If pt is not able to tolerate PO intake, recommend Isosource 1.5 @ 10 mls/hr to increase by 10 mls q 6 hrs to goal rate of 1800 calories, 81 gms protein, 934 mls free fluid. Flush with 150 mls q 4 hrs for hydration. Monitor residuals and hold if >250 mls.     3) If PPN desired, recommend Clinimix E @ 125 mls/hr with 20% fat emulsions (250 mls) to provide 1004 calories, 50 gms protein, 1800 mls fluid, with GIR 1.15.  Monitor triglycerides and hold if >400.      Nutrition Diagnosis Status:   New              Monitor and Evaluation    Food and Nutrient Intake: energy intake  Food and Nutrient Adminstration: diet order  Anthropometric Measurements: weight, weight  change  Biochemical Data, Medical Tests and Procedures: electrolyte and renal panel, glucose/endocrine profile, lipid profile  Nutrition-Focused Physical Findings: overall appearance, skin  % Intake of Estimated Energy Needs: 0 - 25 %  % Meal Intake: NPO    Nutrition Risk    Level of Risk:  (2 x weekly)    Nutrition Follow-Up     yes

## 2017-06-07 NOTE — H&P
PCP: Gilberto Rosas MD    History & Physical    Chief Complaint: Slurring of speech for a couple of week.    History of Present Illness:  Patient is a 71 y.o. male admitted to Hospitalist Service from [facility] Room with complaint of slurring of speech for a couple of week. Patient has PMH significant for HIV, CAD s/p stent placement, hypertension, COPD. Part of the history obtained from patient's nephew. Patient reported progressively worsening SOB for almost 1 week with associated yellow green productive cough  Without any associated fever. Patient recently started on Requip by PCP for tremors. Patient presented with slurred speech and double vision that started approximately 2-3 weeks ago when he began Requip. He stopped the medicine and symptoms slightly improved but then he restarted approximately week ago as well as began taking Mucinex and symptoms of return.  His nephew, with whom he lives, states he has slurred speech and slight dizziness.  He denied any focal weakness or numbness in his extremities, difficulty swallowing,. Patient denied chest pain, abdominal pain, nausea, vomiting, headache, vision changes, focal neuro-deficits, cough or fever.    Past Medical History:   Diagnosis Date    Arthritis     Asbestos exposure     COPD (chronic obstructive pulmonary disease)     O2 AT NITE PRN    Coronary artery disease     STENT X 1    Depression     HIV (human immunodeficiency virus infection)     Hypertension     NO MED NOW    Migraines     Myocardial infarction     Presence of dental bridge     UPPER AND LOWER    RLS (restless legs syndrome)     Wears glasses      Past Surgical History:   Procedure Laterality Date    CORONARY STENT PLACEMENT      FACIAL FRACTURE SURGERY  metal in left brow and cheek    1970's    finger scraped Right     index finger scraped at [facility] for staph infection    rectal warts  2013    warts removed       Family History   Problem Relation  "Age of Onset    COPD Mother     Cancer Mother       of lung ca w/mets to stomach    Asbestos Mother     Vision loss Sister     Blindness Sister      with bilatereal removal     Depression Sister     Cancer Brother      lung    COPD Brother     Stroke Maternal Aunt     Heart disease Maternal Grandmother      pacemaker    Stroke Maternal Grandmother      Social History   Substance Use Topics    Smoking status: Current Every Day Smoker     Packs/day: 0.50     Years: 50.00     Types: Cigarettes     Last attempt to quit: 2015    Smokeless tobacco: Never Used      Comment: Pt states "already have all the information"    Alcohol use No      Review of patient's allergies indicates:   Allergen Reactions    Unable to assess Other (See Comments)     REX (DRUG FOR HIV) TAKEN  ALMOST PARALYZED LEGS    Aztreonam Nausea And Vomiting     PTA Medications   Medication Sig    acetaminophen (TYLENOL) 500 MG tablet Take 500 mg by mouth every 6 (six) hours as needed. 2 TABS    albuterol (ACCUNEB) 0.63 mg/3 mL Nebu Take 0.63 mg by nebulization 4 (four) times daily as needed.     budesonide-formoterol 80-4.5 mcg (SYMBICORT) 80-4.5 mcg/actuation HFAA Inhale 2 puffs into the lungs.    clopidogrel (PLAVIX) 75 mg tablet Take 75 mg by mouth once daily.    cyproheptadine (PERIACTIN) 4 mg tablet Take 4 mg by mouth 3 (three) times daily as needed.    efavirenz-emtrictabine-tenofovir 600-200-300 mg (ATRIPLA) 600-200-300 mg Tab Take 1 tablet by mouth every evening.     ergocalciferol (ERGOCALCIFEROL) 50,000 unit Cap Take 50,000 Units by mouth every 7 days. Takes on     famotidine (PEPCID) 20 MG tablet Take 1 tablet (20 mg total) by mouth 2 (two) times daily.    fluoxetine (PROZAC) 40 MG capsule Take 40 mg by mouth nightly.     gabapentin (NEURONTIN) 800 MG tablet Take 900 mg by mouth 3 (three) times daily.     hydrocodone-acetaminophen (VICODIN ES) 7.5-300 mg Tab Take by mouth.    methylPREDNISolone " (MEDROL DOSEPACK) 4 mg tablet follow package directions    metoprolol tartrate (LOPRESSOR) 25 MG tablet 12.5 mg 2 (two) times daily.     morphine (MSIR) 15 MG tablet Take 15 mg by mouth every 2 (two) hours as needed for Pain.    nitroGLYCERIN (NITROSTAT) 0.3 MG SL tablet Place 0.3 mg under the tongue every 5 (five) minutes as needed for Chest pain.    ondansetron (ZOFRAN-ODT) 4 MG TbDL Take 8 mg by mouth once.    sucralfate (CARAFATE) 1 gram tablet Take 1 tablet (1 g total) by mouth 4 (four) times daily.    tiotropium (SPIRIVA) 18 mcg inhalation capsule Inhale 18 mcg into the lungs once daily.    topiramate (TOPAMAX) 50 MG tablet Take 50 mg by mouth 2 (two) times daily. 2 TABS HS    tramadol (ULTRAM) 50 mg tablet Take 50 mg by mouth every 6 (six) hours as needed for Pain.    valacyclovir (VALTREX) 500 MG tablet Take 500 mg by mouth 2 (two) times daily.    warfarin (COUMADIN) 2.5 MG tablet Take 2.5 mg by mouth Daily.     albuterol-ipratropium 2.5mg-0.5mg/3mL (DUO-NEB) 0.5 mg-3 mg(2.5 mg base)/3 mL nebulizer solution Take 3 mLs by nebulization every 6 (six) hours as needed for Wheezing.     Review of Systems:  Constitutional: no fever or chills. + Weakness  Eyes: + visual changes - occasional double vision  Ears, nose, mouth, throat, and face: no nasal congestion or sore throat  Respiratory: no cough or shorness of breath  Cardiovascular: no chest pain or palpitations  Gastrointestinal: no nausea or vomiting, no abdominal pain or change in bowel habits  Genitourinary: no hematuria or dysuria  Integument/breast: no rash or pruritis  Hematologic/lymphatic: no easy bruising or lymphadenopathy  Musculoskeletal: no arthralgias or myalgias  Neurological: no seizures + tremors + slurring of speech + Dizziness  Behavioral/Psych: no auditory or visual hallucinations  Endocrine: no heat or cold intolerance     OBJECTIVE:     Vital Signs (Most Recent)  Temp: 97.8 °F (36.6 °C) (06/07/17 1218)  Pulse: 74 (06/07/17  1218)  Resp: 20 (06/07/17 1218)  BP: (!) 143/75 (06/07/17 1218)  SpO2: 97 % (06/07/17 1218)    Physical Exam:  General appearance: well developed, appears stated age  Head: normocephalic, atraumatic  Eyes:  conjunctivae/corneas clear. PERRL. Appears to have a slight ocular nerve palsy when looking upwards in the right eye.   Nose: Nares normal. Septum midline.  Throat: lips, dry mucosa, and tongue normal; teeth and gums normal, no throat erythema.  Neck: supple, symmetrical, trachea midline, no JVD and thyroid not enlarged, symmetric, no tenderness/mass/nodules  Lungs:  clear to auscultation bilaterally and normal respiratory effort  Chest wall: no tenderness  Heart: regular rate and rhythm, S1, S2 normal, no murmur, click, rub or gallop  Abdomen: soft, non-tender non-distented; bowel sounds normal; no masses,  no organomegaly  Extremities: no cyanosis, clubbing or edema.   Pulses: 2+ and symmetric  Skin: Skin color, texture, turgor normal. No rashes or lesions.  Lymph nodes: Cervical, supraclavicular, and axillary nodes normal.  Neurologic: Normal strength and tone. CNII-XII intact except CN 4 partial palsy. Right lower face possible drooping.    Laboratory:   CBC:   Recent Labs  Lab 06/07/17  0953   WBC 9.60   RBC 3.70*   HGB 12.2*   HCT 37.5*      *   MCH 33.0*   MCHC 32.6     CMP:   Recent Labs  Lab 06/07/17  0953   GLU 94   CALCIUM 8.8   ALBUMIN 3.1*   PROT 7.0      K 4.3   CO2 29      BUN 20   CREATININE 1.1   ALKPHOS 146*   ALT 10   AST 16   BILITOT 0.3     No results found for: HGBA1C  Microbiology Results (last 7 days)     ** No results found for the last 168 hours. **        Diagnostic Results:  Chest X-Ray: COPD without acute process or significant change.    CT Head: No acute intracranial process.   Chronic left basal ganglia lacunar infarct.  Left maxillary sinus disease.    Assessment/Plan:     Active Hospital Problems    Diagnosis  POA    *Slurring of speech [R47.81]  Admit  to telemetry floor.  Neurochecks q 4 hrs x 24 hrs.  Fall and seizure precautions.  NPO until evaluated by speech therapist for swallowing assessment.  Continue Coumadin and Plavix as before.  No neuro services available, explained to the patient.   MRI Brain.  MRA of head and neck.  2 D ECHO for evaluation of intra-cardiac thrombus or valvular heart disease.  Check Lipid Profile.  Consult Physical Therapy and Occupational therapy for evaluation and treatment.    Yes    Moderate malnutrition [E44.0]  Severe. Nutrition consulted. Encourage maximal PO intake. Diet supplementation ordered per nutrition approval. Will encourage PO and monitor closely for weight changes.    Yes    Chronic obstructive pulmonary disease with acute exacerbation [J44.1]  Supplemental O2 via nasal canula; titrate O2 saturation to >92%.   Start Solumedrol 62.5 mg IV q 8 hrs.   Continue beta 2 agonist bronchodilator treatments.   Continue IV antibiotics.  Check sputum GS and Cx.   Continue routine medications as before.   Smoking cessation counseling performed. Dangers of cigarette smoking were reviewed with patient in detail and patient was encouraged to quit. Nicotine replacement options were discussed for > 3 minutes.    Yes    CAD (coronary artery disease) [I25.10]  Patient with known CAD and monitor for S/Sx of angina/ACS. Continue to monitor on telemetry.    Yes    HIV (human immunodeficiency virus infection) [Z21]  Continue HAART therapy.  Yes        VTE Risk Mitigation         Ordered     warfarin (COUMADIN) tablet 2.5 mg  Daily     Route:  Oral        06/07/17 8169        Analilia Nolasco MD  Department of Hospital Medicine   [facility]

## 2017-06-07 NOTE — ASSESSMENT & PLAN NOTE
Nutrition Problem:   Moderate Malnutrition    % Intake of Estimated Energy Needs: 0 - 25 %  % Meal Intake: NPO  Malnutrition Reason: Illness Related  Moderate Weight Loss Chronic Illness: Other: Unintentional 4% wt loss x 4 months    Etiology/Related to:   Illness related as evidenced by: 1) Underweight with BMI 16.72 2) Fat loss noted around triceps 3) Muscle loss with protruding clavicle and hollow around orbitals.      Treatment Strategy:   Recommend  1) If PO intake is appropriate, advance per pt tolerance to cardiac diet with texture per SLP with Boost Plus VS     2) If pt is not able to tolerate PO intake, recommend Isosource 1.5 @ 10 mls/hr to increase by 10 mls q 6 hrs to goal rate of 1800 calories, 81 gms protein, 934 mls free fluid. Flush with 150 mls q 4 hrs for hydration. Monitor residuals and hold if >250 mls.     3) If PPN desired, recommend Clinimix E @ 125 mls/hr with 20% fat emulsions (250 mls) to provide 1004 calories, 50 gms protein, 1800 mls fluid, with GIR 1.15.  Monitor triglycerides and hold if >400.      Nutrition Diagnosis Status:   New

## 2017-06-07 NOTE — ED PROVIDER NOTES
Encounter Date: 6/7/2017       History     Chief Complaint   Patient presents with    Shortness of Breath     for a week and getting worse     Review of patient's allergies indicates:   Allergen Reactions    Unable to assess Other (See Comments)     REX (DRUG FOR HIV) TAKEN 1989 ALMOST PARALYZED LEGS    Aztreonam Nausea And Vomiting     Patient is a 71-year-old male with a past medical history of COPD coronary artery disease hypertension prior MI depression migraines HIV who presents to emergency department for evaluation of increasing dyspnea over the past week with productive cough of yellowish-green sputum but no fevers.  He continues to use his inhalers at home and is not currently on antibiotics or steroids.    The patient has a second complaint of tremulousness slurred speech and double vision that started approximately 2-3 weeks ago when he began ropinirole .  He stopped the medicine and symptoms slightly improved but then he restarted approximately week ago as well as began taking Mucinex and symptoms of return.  His nephew, with whom he lives, states he has slurred speech and slight dizziness.  He denies any focal weakness or numbness in his extremities, difficulty swallowing, chest pain, vomiting, diarrhea, abdominal pain, dysuria, hematuria, black or bloody stools.           Past Medical History:   Diagnosis Date    Arthritis     Asbestos exposure     COPD (chronic obstructive pulmonary disease)     O2 AT NITE PRN    Coronary artery disease     STENT X 1    Depression     HIV (human immunodeficiency virus infection)     Hypertension     NO MED NOW    Migraines     Myocardial infarction     Presence of dental bridge     UPPER AND LOWER    RLS (restless legs syndrome)     Wears glasses      Past Surgical History:   Procedure Laterality Date    CORONARY STENT PLACEMENT      FACIAL FRACTURE SURGERY  metal in left brow and cheek    1970's    finger scraped Right     index finger scraped at  "[facility] main for staph infection    rectal warts      warts removed       Family History   Problem Relation Age of Onset    COPD Mother     Cancer Mother       of lung ca w/mets to stomach    Asbestos Mother     Vision loss Sister     Blindness Sister      with bilatereal removal     Depression Sister     Cancer Brother      lung    COPD Brother     Stroke Maternal Aunt     Heart disease Maternal Grandmother      pacemaker    Stroke Maternal Grandmother      Social History   Substance Use Topics    Smoking status: Current Every Day Smoker     Packs/day: 0.50     Years: 50.00     Types: Cigarettes     Last attempt to quit: 2015    Smokeless tobacco: Never Used      Comment: Pt states "already have all the information"    Alcohol use No     Review of Systems   Constitutional: Negative for activity change, appetite change, chills, diaphoresis, fatigue and fever.   HENT: Negative for congestion, rhinorrhea, sore throat and trouble swallowing.    Eyes: Positive for visual disturbance. Negative for photophobia and pain.   Respiratory: Positive for cough, chest tightness, shortness of breath and wheezing.    Cardiovascular: Negative for chest pain and palpitations.   Gastrointestinal: Negative for abdominal pain, diarrhea, nausea and vomiting.   Endocrine: Negative for polydipsia and polyuria.   Genitourinary: Negative for difficulty urinating and dysuria.   Musculoskeletal: Negative for arthralgias, myalgias, neck pain and neck stiffness.   Skin: Negative for color change and rash.   Neurological: Positive for dizziness and speech difficulty. Negative for seizures, syncope, weakness, light-headedness, numbness and headaches.   Psychiatric/Behavioral: Positive for confusion. Negative for agitation.       Physical Exam     Initial Vitals   BP Pulse Resp Temp SpO2   17 0845 17 0845 17 0845 17 0845 17 0840   (!) 159/77 97 (!) 24 98 °F (36.7 °C) (!) 94 %     Physical " Exam    Nursing note and vitals reviewed.  Constitutional: He appears well-developed and well-nourished.   HENT:   Head: Normocephalic and atraumatic.   Right Ear: External ear normal.   Left Ear: External ear normal.   Dry mucous membranes   Eyes: Conjunctivae are normal.   Appears to have a slight ocular nerve palsy when looking upwards in the right eye.   Neck: Normal range of motion. Neck supple. No JVD present.   Cardiovascular: Normal rate, regular rhythm, normal heart sounds and intact distal pulses. Exam reveals no gallop and no friction rub.    No murmur heard.  Pulmonary/Chest: No stridor. He has wheezes. He has rales (left base). He exhibits no tenderness.   Abdominal: Soft. There is no tenderness.   Musculoskeletal: Normal range of motion. He exhibits no edema.   Neurological: He is alert and oriented to person, place, and time. He has normal strength.   Ocular nerve palsy in the right eye when looking upwards? CN 4?    ? Right facial droop w/ dysarthria  No pronator drift or leg drift         Skin: Skin is warm and dry. Capillary refill takes less than 2 seconds. No rash noted.   Psychiatric: He has a normal mood and affect.         ED Course   Procedures  Labs Reviewed   CBC W/ AUTO DIFFERENTIAL   COMPREHENSIVE METABOLIC PANEL   APTT   PROTIME-INR             Medical Decision Making:   Patient appears to have COPD exacerbation.  In regards to the symptoms of dysarthria and visual disturbances with possible ocular nerve palsy we will admit and workup for potential CVA.  He is not a TPA candidate given the onset of symptoms.  The symptoms are very subtle findings include also be related to the medications he is now taking.  We did a neurology consult and recommended MRI which is been ordered and the patient be admitted to the hospitalist.                   ED Course     Clinical Impression:   The primary encounter diagnosis was COPD with acute exacerbation. Diagnoses of Blurred vision, Ocular palsy of  right eye, Dysarthria, Arrhythmia, and Slurring of speech were also pertinent to this visit.          Javon Amato MD  06/09/17 0659

## 2017-06-07 NOTE — PLAN OF CARE
Problem: Patient Care Overview  Goal: Plan of Care Review  POC reviewed with pt, pt verbalized understanding. Safety maintained throughout shift, bed locked and in lowest position, call light in reach, Side rails up X 2. Non skid sock on when OOB. Pt remained free of fall/trauma. Pt denies pain. VSS, pt remained afebrile this shift. Pt NPO until Speech evaluation. Diet advanced to Regular, tolerating meals wells, no reports of nausea and vomiting. MRI, MRA, CT completed this shift.  Telemetry placed, NSR with PVC's.  Will continue to monitor.

## 2017-06-07 NOTE — PROGRESS NOTES
Ref. Range 6/7/2017 16:51   Protime Latest Ref Range: 9.0 - 12.5 sec >100.0 (H)   Coumadin Monitoring INR Latest Ref Range: 0.8 - 1.2  >10.0 (HH)     Critical lab results called into . New orders given to Discontinue Comadin and Vitamin K 10mg PO Stat, and Vitamin K 10mg,  PO in 8hr

## 2017-06-07 NOTE — PLAN OF CARE
Problem: Nutrition, Imbalanced: Inadequate Oral Intake (Adult)  Intervention: Promote/Optimize Nutrition  Recommendation/Intervention: 1) If PO intake is appropriate, advance per pt tolerance to cardiac diet with texture per SLP with Boost Plus VS      2) If pt is not able to tolerate PO intake, recommend Isosource 1.5 @ 10 mls/hr to increase by 10 mls q 6 hrs to goal rate of 1800 calories, 81 gms protein, 934 mls free fluid. Flush with 150 mls q 4 hrs for hydration. Monitor residuals and hold if >250 mls.      3) If PPN desired, recommend Clinimix E @ 125 mls/hr with 20% fat emulsions (250 mls) to provide 1004 calories, 50 gms protein, 1800 mls fluid, with GIR 1.15.  Monitor triglycerides and hold if >400.  Goals: Provide nutrition within 96 hrs.   Nutrition Goal Status: new  Communication of THANIA Recs:  (sticky note, answer to consult)

## 2017-06-07 NOTE — PT/OT/SLP EVAL
"Speech Language Pathology  Evaluation    Boni Lerner   MRN: [id number]   Admitting Diagnosis: Slurring of speech    Diet recommendations: Solid Diet Level: Regular  Liquid Diet Level: Thin     SLP Treatment Date: 06/07/17  Speech Start Time: 1652     Speech Stop Time: 1710     Speech Total (min): 18 min       TREATMENT BILLABLE MINUTES:  Eval Swallow and Oral Function 18    Diagnosis: Slurring of speech      Past Medical History:   Diagnosis Date    Arthritis     Asbestos exposure     COPD (chronic obstructive pulmonary disease)     O2 AT NITE PRN    Coronary artery disease     STENT X 1    Depression     HIV (human immunodeficiency virus infection)     Hypertension     NO MED NOW    Migraines     Myocardial infarction     Presence of dental bridge     UPPER AND LOWER    RLS (restless legs syndrome)     Wears glasses      Past Surgical History:   Procedure Laterality Date    CORONARY STENT PLACEMENT      FACIAL FRACTURE SURGERY  metal in left brow and cheek    1970's    finger scraped Right     index finger scraped at [facility] for staph infection    rectal warts  2013    warts removed         Has the patient been evaluated by SLP for swallowing? : Yes  Keep patient NPO?: No   General Precautions: Standard,      Current Respiratory Status: nasal cannula     Social Hx: Patient lives in the community .    Prior diet: regular textures & liquids per pt      Subjective:  What if I don't want to do this?  I could just refuse, you know.  Patient goals: Don't do this if I don't want to.     Objective:   Patient found with: oxygen.  Alert & minimally cooperative given multiple choices of foods for testing & control of the situation.  Mild dysarthria, which he attributes to "southern speech".  Refused to participate in further speech eval.    Oral Musculature Evaluation  Oral Musculature: WFL  Dentition: upper and lower dentures (partials)  Mucosal Quality: adequate  Mandibular Strength and Mobility: " WFL  Oral Labial Strength and Mobility: WFL  Lingual Strength and Mobility: WFL  Voice Prior to PO Intake: clear     Bedside Swallow Eval:  Consistencies Assessed: Thin liquids Coke via straw sips only, Soft solids tsp tropical fruti cocktail and Solids 1/2 ham sandwich with crusts pulled off  Oral Phase: WNL  Pharyngeal Phase: no overt clinical  signs/symptoms of aspiration and no overt clinical signs/symptoms of pharyngeal dysphagia    Assessment:  Boni Lerner is a 71 y.o. male with a medical diagnosis of Slurring of speech and presented with no s/s oropharyngeal dysphagia.  Noted to have mild dysarthria with imprecision, but refused further eval.  Recommend regular textures & liquids.        Goals:    SLP Goals     Not on file                 Plan:   Plan of Care reviewed with: patient  SLP Follow-up?: No              Miri Paul CCC-SLP/A  06/07/2017

## 2017-06-08 LAB
ANION GAP SERPL CALC-SCNC: 5 MMOL/L
BASOPHILS # BLD AUTO: 0 K/UL
BASOPHILS NFR BLD: 0.7 %
BUN SERPL-MCNC: 21 MG/DL
CALCIUM SERPL-MCNC: 8.6 MG/DL
CHLORIDE SERPL-SCNC: 108 MMOL/L
CO2 SERPL-SCNC: 26 MMOL/L
CREAT SERPL-MCNC: 1.1 MG/DL
DIFFERENTIAL METHOD: ABNORMAL
EOSINOPHIL # BLD AUTO: 0 K/UL
EOSINOPHIL NFR BLD: 0.1 %
ERYTHROCYTE [DISTWIDTH] IN BLOOD BY AUTOMATED COUNT: 15.6 %
EST. GFR  (AFRICAN AMERICAN): >60 ML/MIN/1.73 M^2
EST. GFR  (NON AFRICAN AMERICAN): >60 ML/MIN/1.73 M^2
ESTIMATED AVG GLUCOSE: 103 MG/DL
GLUCOSE SERPL-MCNC: 127 MG/DL
HBA1C MFR BLD HPLC: 5.2 %
HCT VFR BLD AUTO: 33.4 %
HGB BLD-MCNC: 10.9 G/DL
INR PPP: 5.4
INR PPP: >10
LYMPHOCYTES # BLD AUTO: 1.1 K/UL
LYMPHOCYTES NFR BLD: 21.1 %
MCH RBC QN AUTO: 33.2 PG
MCHC RBC AUTO-ENTMCNC: 32.7 %
MCV RBC AUTO: 101 FL
MONOCYTES # BLD AUTO: 0.2 K/UL
MONOCYTES NFR BLD: 4.2 %
NEUTROPHILS # BLD AUTO: 3.8 K/UL
NEUTROPHILS NFR BLD: 73.9 %
PLATELET # BLD AUTO: 144 K/UL
PMV BLD AUTO: 8 FL
POTASSIUM SERPL-SCNC: 4.5 MMOL/L
PROTHROMBIN TIME: 52.7 SEC
PROTHROMBIN TIME: >100 SEC
RBC # BLD AUTO: 3.3 M/UL
SODIUM SERPL-SCNC: 139 MMOL/L
TSH SERPL DL<=0.005 MIU/L-ACNC: 0.5 UIU/ML
WBC # BLD AUTO: 5.1 K/UL

## 2017-06-08 PROCEDURE — G8988 SELF CARE GOAL STATUS: HCPCS | Mod: CJ

## 2017-06-08 PROCEDURE — 97110 THERAPEUTIC EXERCISES: CPT

## 2017-06-08 PROCEDURE — 36415 COLL VENOUS BLD VENIPUNCTURE: CPT

## 2017-06-08 PROCEDURE — 80048 BASIC METABOLIC PNL TOTAL CA: CPT

## 2017-06-08 PROCEDURE — 25000242 PHARM REV CODE 250 ALT 637 W/ HCPCS: Performed by: INTERNAL MEDICINE

## 2017-06-08 PROCEDURE — 83036 HEMOGLOBIN GLYCOSYLATED A1C: CPT

## 2017-06-08 PROCEDURE — 27000221 HC OXYGEN, UP TO 24 HOURS

## 2017-06-08 PROCEDURE — 99900035 HC TECH TIME PER 15 MIN (STAT)

## 2017-06-08 PROCEDURE — 25000003 PHARM REV CODE 250: Performed by: INTERNAL MEDICINE

## 2017-06-08 PROCEDURE — 94640 AIRWAY INHALATION TREATMENT: CPT

## 2017-06-08 PROCEDURE — 97803 MED NUTRITION INDIV SUBSEQ: CPT

## 2017-06-08 PROCEDURE — 97535 SELF CARE MNGMENT TRAINING: CPT

## 2017-06-08 PROCEDURE — 63600175 PHARM REV CODE 636 W HCPCS: Performed by: INTERNAL MEDICINE

## 2017-06-08 PROCEDURE — 97165 OT EVAL LOW COMPLEX 30 MIN: CPT

## 2017-06-08 PROCEDURE — G8987 SELF CARE CURRENT STATUS: HCPCS | Mod: CK

## 2017-06-08 PROCEDURE — 85610 PROTHROMBIN TIME: CPT

## 2017-06-08 PROCEDURE — 25000003 PHARM REV CODE 250: Performed by: NURSE PRACTITIONER

## 2017-06-08 PROCEDURE — 12000002 HC ACUTE/MED SURGE SEMI-PRIVATE ROOM

## 2017-06-08 PROCEDURE — 84443 ASSAY THYROID STIM HORMONE: CPT

## 2017-06-08 PROCEDURE — C9113 INJ PANTOPRAZOLE SODIUM, VIA: HCPCS | Performed by: EMERGENCY MEDICINE

## 2017-06-08 PROCEDURE — 25000003 PHARM REV CODE 250: Performed by: EMERGENCY MEDICINE

## 2017-06-08 PROCEDURE — G0378 HOSPITAL OBSERVATION PER HR: HCPCS

## 2017-06-08 PROCEDURE — 63600175 PHARM REV CODE 636 W HCPCS: Performed by: EMERGENCY MEDICINE

## 2017-06-08 PROCEDURE — 99232 SBSQ HOSP IP/OBS MODERATE 35: CPT | Mod: ,,, | Performed by: INTERNAL MEDICINE

## 2017-06-08 PROCEDURE — 85025 COMPLETE CBC W/AUTO DIFF WBC: CPT

## 2017-06-08 RX ORDER — FOLIC ACID 1 MG/1
1 TABLET ORAL DAILY
Status: DISCONTINUED | OUTPATIENT
Start: 2017-06-08 | End: 2017-06-09 | Stop reason: HOSPADM

## 2017-06-08 RX ORDER — METHYLPREDNISOLONE SOD SUCC 125 MG
80 VIAL (EA) INJECTION EVERY 12 HOURS
Status: DISCONTINUED | OUTPATIENT
Start: 2017-06-08 | End: 2017-06-09 | Stop reason: HOSPADM

## 2017-06-08 RX ADMIN — FAMOTIDINE 20 MG: 20 TABLET ORAL at 09:06

## 2017-06-08 RX ADMIN — FOLIC ACID 1 MG: 1 TABLET ORAL at 11:06

## 2017-06-08 RX ADMIN — METHYLPREDNISOLONE SODIUM SUCCINATE 80 MG: 125 INJECTION, POWDER, FOR SOLUTION INTRAMUSCULAR; INTRAVENOUS at 06:06

## 2017-06-08 RX ADMIN — SUCRALFATE 1 G: 1 TABLET ORAL at 11:06

## 2017-06-08 RX ADMIN — VALACYCLOVIR HYDROCHLORIDE 500 MG: 500 TABLET, FILM COATED ORAL at 09:06

## 2017-06-08 RX ADMIN — PANTOPRAZOLE SODIUM 40 MG: 40 INJECTION, POWDER, FOR SOLUTION INTRAVENOUS at 09:06

## 2017-06-08 RX ADMIN — SODIUM CHLORIDE, PRESERVATIVE FREE 3 ML: 5 INJECTION INTRAVENOUS at 09:06

## 2017-06-08 RX ADMIN — SUCRALFATE 1 G: 1 TABLET ORAL at 12:06

## 2017-06-08 RX ADMIN — FLUOXETINE 40 MG: 20 CAPSULE ORAL at 09:06

## 2017-06-08 RX ADMIN — GABAPENTIN 900 MG: 300 CAPSULE ORAL at 09:06

## 2017-06-08 RX ADMIN — SUCRALFATE 1 G: 1 TABLET ORAL at 06:06

## 2017-06-08 RX ADMIN — Medication 12.5 MG: at 09:06

## 2017-06-08 RX ADMIN — TOPIRAMATE 50 MG: 25 TABLET, FILM COATED ORAL at 09:06

## 2017-06-08 RX ADMIN — NICOTINE 1 PATCH: 21 PATCH, EXTENDED RELEASE TRANSDERMAL at 09:06

## 2017-06-08 RX ADMIN — PHYTONADIONE 10 MG: 5 TABLET ORAL at 03:06

## 2017-06-08 RX ADMIN — METHYLPREDNISOLONE SODIUM SUCCINATE 80 MG: 125 INJECTION, POWDER, FOR SOLUTION INTRAMUSCULAR; INTRAVENOUS at 09:06

## 2017-06-08 RX ADMIN — EMTRICITABINE 200 MG: 200 CAPSULE ORAL at 09:06

## 2017-06-08 RX ADMIN — CLOPIDOGREL BISULFATE 75 MG: 75 TABLET ORAL at 09:06

## 2017-06-08 RX ADMIN — SODIUM CHLORIDE, PRESERVATIVE FREE 3 ML: 5 INJECTION INTRAVENOUS at 06:06

## 2017-06-08 RX ADMIN — FLUTICASONE FUROATE AND VILANTEROL TRIFENATATE 1 PUFF: 100; 25 POWDER RESPIRATORY (INHALATION) at 08:06

## 2017-06-08 RX ADMIN — EFAVIRENZ 600 MG: 600 TABLET, FILM COATED ORAL at 09:06

## 2017-06-08 RX ADMIN — TENOFOVIR DISOPROXIL FUMARATE 300 MG: 300 TABLET, COATED ORAL at 09:06

## 2017-06-08 RX ADMIN — IPRATROPIUM BROMIDE AND ALBUTEROL SULFATE 3 ML: .5; 3 SOLUTION RESPIRATORY (INHALATION) at 09:06

## 2017-06-08 RX ADMIN — SODIUM CHLORIDE, PRESERVATIVE FREE 3 ML: 5 INJECTION INTRAVENOUS at 02:06

## 2017-06-08 RX ADMIN — MOXIFLOXACIN HYDROCHLORIDE 400 MG: 400 INJECTION, SOLUTION INTRAVENOUS at 12:06

## 2017-06-08 RX ADMIN — GABAPENTIN 900 MG: 300 CAPSULE ORAL at 06:06

## 2017-06-08 RX ADMIN — GABAPENTIN 900 MG: 300 CAPSULE ORAL at 02:06

## 2017-06-08 RX ADMIN — SENNOSIDES 8.6 MG: 8.6 TABLET, FILM COATED ORAL at 09:06

## 2017-06-08 NOTE — PT/OT/SLP PROGRESS
Physical Therapy      Boni Lerner  MRN: [id number]    PT attempted to perform evaluation in AM. Patient not seen secondary to pt refuses to participate in physical therapy, stating he is too tired to do anything else because the OT just left his room.  . Will follow-up in PM 6/8/17 and/or 6/9/17.    Ilda Reynoso, PT

## 2017-06-08 NOTE — PLAN OF CARE
Problem: Occupational Therapy Goal  Goal: Occupational Therapy Goal  Goals to be met by: 6/18/17     Patient will increase functional independence with ADLs by performing:    LE Dressing with Set-up Assistance, Contact Guard Assistance and Assistive Devices as needed.  Toileting from bedside commode with Supervision, Set-up Assistance and Assistive Devices as needed for hygiene and clothing management.   Toilet transfer to bedside commode with Supervision.  Upper extremity exercise program x10 reps per handout, with assistance as needed.  Pt to utilize energy conservation techniques during ADL task performance with occasional cues.    Outcome: Ongoing (interventions implemented as appropriate)  OT evaluation completed today. Goals & care plan established.    RANJAN Montanez  6/8/2017

## 2017-06-08 NOTE — PLAN OF CARE
Problem: Nutrition, Imbalanced: Inadequate Oral Intake (Adult)  Intervention: Promote/Optimize Nutrition  Recommendation/Intervention: 1)Continue cardiac diet with texture per SLP with Boost Plus   Goals: 1) Provide nutrition within 96 hrs. 2) Meal intake of 75% 3) Utilize Boost Plus  Nutrition Goal Status: 1) met 2) new 3) new  Communication of RD Recs:  (care plan)

## 2017-06-08 NOTE — PROGRESS NOTES
I attempted to complete dc assessment but pt was being cared for by staff. I will return to complete.  Beena Upton LMSW

## 2017-06-08 NOTE — PLAN OF CARE
Problem: Patient Care Overview  Goal: Plan of Care Review  Outcome: Ongoing (interventions implemented as appropriate)  Pt aaox4. Pt uses urinal at bedside, continent of bowel. Telemetry in place-P and T waves inverted with accelerated junctional rhythm. arousable upon calling his name, pt sleeps in between care, receiving all scheduled PO and IV meds. Pt remained free of falls during shift. No breakdown of skin, bed in lowest position and call light in reach. Will continue to monitor pt.

## 2017-06-08 NOTE — PLAN OF CARE
06/08/17 0836   PRE-TX-O2-ETCO2   O2 Device (Oxygen Therapy) nasal cannula   $ Is the patient on Oxygen? Yes   Flow (L/min) 3   Oxygen Concentration (%) 32   SpO2 99 %   Pulse 67   Resp 18   Inhaler   $ Inhaler Charges MDI (Metered Dose Inahler) Treatment;Mouth rinsed post treatment   Respiratory Treatment Status given   SVN/Inhaler Treatment Route mouthpiece   Patient Tolerance good   Ready to Wean/Extubation Screen   FIO2<60 (chart decimal) 0.32

## 2017-06-08 NOTE — PROGRESS NOTES
Notified Roxanne PERALTA via [employer] that monitor room called to report that pt has inverted P and T waves showing on monitor and accelerated junctional rhythm. Will continue to monitor pt.

## 2017-06-08 NOTE — PLAN OF CARE
06/08/17 1418   Discharge Assessment   Assessment Type Discharge Planning Assessment   Confirmed/corrected address and phone number on facesheet? Yes   Assessment information obtained from? Patient;Medical Record   Communicated expected length of stay with patient/caregiver no   Type of Healthcare Directive Received (If needed pts nephmiranda Sumner [phone])   If Healthcare Directive is received, is it scanned into Epic? no (comment)   Prior to hospitilization cognitive status: Alert/Oriented   Prior to hospitalization functional status: Assistive Equipment   Current cognitive status: Alert/Oriented   Current Functional Status: Assistive Equipment   Lives With other relative(s)   Able to Return to Prior Arrangements yes   Is patient able to care for self after discharge? Yes   Readmission Within The Last 30 Days no previous admission in last 30 days   Patient currently being followed by outpatient case management? No   Patient currently receives home health services? No   Does the patient currently use HME? Yes   Patient currently receives private duty nursing? No   Patient currently receives any other outside agency services? No   Equipment Currently Used at Home 3-in-1 commode;bedside commode;CPAP;oxygen;respiratory supplies;wheelchair   Do you have any problems affording any of your prescribed medications? No   Is the patient taking medications as prescribed? yes   Do you have any financial concerns preventing you from receiving the healthcare you need? No   Does the patient have transportation to healthcare appointments? Yes   Transportation Available family or friend will provide;car   On Dialysis? No   Does the patient receive outpatient dialysis? No   Does the patient receive services at the Coumadin Clinic? No   Are there any open cases? No   Discharge Plan A Home   Discharge Plan B Home with family   Patient/Family In Agreement With Plan yes

## 2017-06-08 NOTE — CONSULTS
[facility]  Adult Nutrition  Consult Note    SUMMARY     Recommendations    Recommendation/Intervention: 1)Continue cardiac diet with texture per SLP with Boost Plus   Goals: 1) Provide nutrition within 96 hrs. 2) Meal intake of 75% 3) Utilize Boost Plus  Nutrition Goal Status: 1) met 2) new 3) new  Communication of RD Recs:  (care plan)    Continuum of Care Plan    Referral to Outpatient Services:  (Discharge Plan: cardiac diet with Boost Plus prn)    Reason for Assessment    Reason for Assessment: RD follow up      1. COPD with acute exacerbation    2. Blurred vision    3. Ocular palsy of right eye    4. Dysarthria    5. Arrhythmia      Past Medical History:   Diagnosis Date    Arthritis     Asbestos exposure     COPD (chronic obstructive pulmonary disease)     O2 AT NITE PRN    Coronary artery disease     STENT X 1    Depression     HIV (human immunodeficiency virus infection)     Hypertension     NO MED NOW    Migraines     Myocardial infarction     Presence of dental bridge     UPPER AND LOWER    RLS (restless legs syndrome)     Wears glasses       Interdisciplinary Rounds: did not attend     General Information Comments: Admitted with SOB x 1-2 weeks, COPD.   Pt reports good appetite, is using Boost Plus minimally. Wants it to be very cold.  Spoke with nursing re providing him large container of ice to keep Boost Plus chilled.  Minimal wt loss since 2/1/17. Dropped from 56.7 kg to 54.3 kg (4% x 4 months)    Nutrition Prescription Ordered    Current Diet Order: Cardiac with Boost Plus, tid     Evaluation of Received Nutrients/Fluid Intake  Energy Calories Required: not meeting needs  Average of last 3 documented meals: 50%  Protein Required: not meeting needs     Fluid Required: not meeting needs    Intake/Output Summary (Last 24 hours) at 06/08/17 1437  Last data filed at 06/08/17 0600   Gross per 24 hour   Intake              729 ml   Output              300 ml   Net               429 ml      Nutrition Risk Screen     Nutrition Risk Screen: reduced oral intake over the last month, dysphagia or difficulty swallowing, unintentional loss of 10 lbs or more in the past 2 mos    Nutrition/Diet History    Patient Reported Diet/Restrictions/Preferences: general  Food Preferences: No cultural or religous preferences identified.      Labs/Tests/Procedures/Meds    Diagnostic Test/Procedure Review: reviewed, pertinent  Pertinent Labs Reviewed: reviewed, pertinent      BMP  Lab Results   Component Value Date     06/08/2017    K 4.5 06/08/2017     06/08/2017    CO2 26 06/08/2017    BUN 21 06/08/2017    CREATININE 1.1 06/08/2017    CALCIUM 8.6 (L) 06/08/2017    ANIONGAP 5 (L) 06/08/2017    ESTGFRAFRICA >60 06/08/2017    EGFRNONAA >60 06/08/2017     No results for input(s): POCTGLUCOSE in the last 24 hours.   Lab Results   Component Value Date    HGBA1C 5.2 06/08/2017    (pending)    Lab Results   Component Value Date    CHOL 136 06/07/2017    CHOL 158 04/06/2014     Lab Results   Component Value Date    HDL 41 06/07/2017    HDL 46 04/06/2014    HDL 43 07/22/2013     Lab Results   Component Value Date    LDLCALC 71.2 06/07/2017    LDLCALC 97.4 04/06/2014    LDLCALC 83 07/22/2013     Lab Results   Component Value Date    TRIG 119 06/07/2017    TRIG 73 04/06/2014     Lab Results   Component Value Date    CHOLHDL 30.1 06/07/2017    CHOLHDL 29.1 04/06/2014       Pertinent Medications Reviewed: reviewed, pertinent      Scheduled Meds:   clopidogrel  75 mg Oral Daily    efavirenz  600 mg Oral QHS    And    emtricitabine  200 mg Oral QHS    And    tenofovir  300 mg Oral QHS    ergocalciferol  50,000 Units Oral Q7 Days    famotidine  20 mg Oral BID    fluoxetine  40 mg Oral Nightly    fluticasone-vilanterol  1 puff Inhalation Daily    folic acid  1 mg Oral Daily    gabapentin  900 mg Oral TID    methylPREDNISolone sodium succinate  80 mg Intravenous Q12H    metoprolol tartrate  12.5 mg  "Oral BID    nicotine  1 patch Transdermal Daily    pantoprazole  40 mg Intravenous Daily    senna  8.6 mg Oral Daily    sodium chloride 0.9%  3 mL Intravenous Q8H    sucralfate  1 g Oral QID    topiramate  50 mg Oral BID    valacyclovir  500 mg Oral BID     Continuous Infusions:       Physical Findings    Overall Physical Appearance: loss of muscle mass, loss of subcutaneous fat  Oral/Mouth Cavity: dental applicance present  Skin:  Karl score 19, no skin breakdown per nursing    Anthropometrics    Temp: 98.1 °F (36.7 °C)  Height: 5' 11"  Weight Method: Bed Scale  Weight: 54.3 kg (119 lb 9.6 oz)  Ideal Body Weight (IBW), Male: 172 lb  % Ideal Body Weight, Male (lb): 69.53 lb  BMI (Calculated): 16.7  BMI Grade: 16 - 16.9 protein-energy malnutrition grade II  Usual Body Weight (UBW), k.7 kg (4% loss x 4 months(chart review 17))   Weight Loss: unintentional     Estimated/Assessed Needs    Weight Used For Calorie Calculations: 54.3 kg (119 lb 11.4 oz)   Height (cm): 180.3 cm  Energy Need Method: Kcal/kg (6645-3516 (30-35 kcal/kg for wt gain))  Weight Used For Protein Calculations: 54.3 kg (119 lb 11.4 oz) (65.3-108.8 gms (1.2-2.0 gm/kg/day))  Fluid Need Method: RDA Method (or per MD rec)  CHO Requirement: not required     Assessment and Plan    Moderate malnutrition    Nutrition Problem:   Moderate Malnutrition    % Intake of Estimated Energy Needs: 0 - 25 %  % Meal Intake: NPO  Malnutrition Reason: Illness Related  Moderate Weight Loss Chronic Illness: Other: Unintentional 4% wt loss x 4 months    Etiology/Related to:   Illness related as evidenced by: 1) Underweight with BMI 16.72 2) Fat loss noted around triceps 3) Muscle loss with protruding clavicle and hollow around orbitals.      Treatment Strategy:   Recommend : 1) If PO intake is appropriate, advance per pt tolerance to cardiac diet with texture per SLP with Boost Plus VS     2) If pt is not able to tolerate PO intake, recommend Isosource 1.5 @ " 10 mls/hr to increase by 10 mls q 6 hrs to goal rate of 1800 calories, 81 gms protein, 934 mls free fluid. Flush with 150 mls q 4 hrs for hydration. Monitor residuals and hold if >250 mls.     3) If PPN desired, recommend Clinimix E @ 125 mls/hr with 20% fat emulsions (250 mls) to provide 1004 calories, 50 gms protein, 1800 mls fluid, with GIR 1.15.  Monitor triglycerides and hold if >400.      Nutrition Diagnosis Status:   progressing              Monitor and Evaluation    Food and Nutrient Intake: energy intake  Food and Nutrient Adminstration: diet order  Anthropometric Measurements: weight, weight change  Biochemical Data, Medical Tests and Procedures: electrolyte and renal panel, glucose/endocrine profile, lipid profile  Nutrition-Focused Physical Findings: overall appearance, skin  % Intake of Estimated Energy Needs: 25-50 %  % Meal Intake: 50% average    Nutrition Risk    Level of Risk:  (2 x weekly)    Nutrition Follow-Up    RD Follow-up?: Yes

## 2017-06-08 NOTE — PROGRESS NOTES
0330- Maisha in lab notified me via telephone that pt has critical labs of PT greater than 100 and INR greater than 10. Roxanne PERALTA notified via [employer] of pt labs waiting further orders at this time. Pt received Vitamin K as scheduled. Will continue to monitor pt.

## 2017-06-08 NOTE — PT/OT/SLP PROGRESS
Physical Therapy      Boni Lerner  MRN: [id number]    Patient not seen today secondary to pt refusing again this pm.  Pt stated he is still fatigued after working with OT this am and refused to sit up EOB or walk with PT for assessment of mobility  . Will follow-up 6/9/2017.  ELANA Broussard notified of pt's refusal for 2nd time today for PT.    Amparo Moore, PT

## 2017-06-08 NOTE — PROGRESS NOTES
Maisha reported via telephone of critical labs PT was 52.7 and INR was 5.4.    Freedom Oliveira NP notified via [employer] of pt critical labs PT 52.7 and INR 5.4. Will continue to monitor pt.

## 2017-06-08 NOTE — PLAN OF CARE
06/08/17 1415   Discharge Assessment   Assessment Type Discharge Planning Assessment   Confirmed/corrected address and phone number on facesheet? Yes   Assessment information obtained from? Patient   Communicated expected length of stay with patient/caregiver no   Type of Healthcare Directive Received (If needed pts nephmiranda Sumner [phone])   If Healthcare Directive is received, is it scanned into Epic? no (comment)   Prior to hospitilization cognitive status: Alert/Oriented   Prior to hospitalization functional status: Assistive Equipment   Current cognitive status: Alert/Oriented   Current Functional Status: Assistive Equipment   Lives With other relative(s)   Able to Return to Prior Arrangements yes   Is patient able to care for self after discharge? Yes   Readmission Within The Last 30 Days no previous admission in last 30 days   Patient currently being followed by outpatient case management? No   Patient currently receives home health services? No   Does the patient currently use HME? Yes   Patient currently receives private duty nursing? No   Patient currently receives any other outside agency services? No   Equipment Currently Used at Home oxygen;respiratory supplies;wheelchair;bedside commode;BIPAP;3-in-1 commode   Do you have any problems affording any of your prescribed medications? No   Is the patient taking medications as prescribed? yes   Do you have any financial concerns preventing you from receiving the healthcare you need? No  (People's [employer] and Medicaid )   Does the patient have transportation to healthcare appointments? Yes   Transportation Available car;family or friend will provide   On Dialysis? No   Does the patient receive services at the [facility]? No   Are there any open cases? No   Discharge Plan A Home   Discharge Plan B Home with family   Patient/Family In Agreement With Plan yes

## 2017-06-08 NOTE — PT/OT/SLP EVAL
Occupational Therapy  Evaluation    Boni Lerner   MRN: [id number]   Admitting Diagnosis: Slurring of speech    OT Date of Treatment: 06/08/17   OT Start Time: 0925  OT Stop Time: 1025  OT Total Time (min): 60 min    Billable Minutes:  Evaluation 8  Self Care/Home Management 42  Therapeutic Exercise 10    Diagnosis: Slurring of speech   Generalized weakness and debility    Past Medical History:   Diagnosis Date    Arthritis     Asbestos exposure     COPD (chronic obstructive pulmonary disease)     O2 AT NITE PRN    Coronary artery disease     STENT X 1    Depression     HIV (human immunodeficiency virus infection)     Hypertension     NO MED NOW    Migraines     Myocardial infarction     Presence of dental bridge     UPPER AND LOWER    RLS (restless legs syndrome)     Wears glasses       Past Surgical History:   Procedure Laterality Date    CORONARY STENT PLACEMENT      FACIAL FRACTURE SURGERY  metal in left brow and cheek    1970's    finger scraped Right     index finger scraped at [facility] for staph infection    rectal warts  2013    warts removed         Referring physician: Lm  Date referred to OT: 6/7/17    General Precautions: Standard, fall, respiratory  Orthopedic Precautions: N/A  Braces: N/A          Patient History:  Living Environment  Lives With: sibling(s), other relative(s) (nephew)  Living Arrangements: house  Home Layout: Able to live on 1st floor  Transportation Available: family or friend will provide  Living Environment Comment: Pt lives with nephew and blind sister in a [facility] with 0 WES. His sister is always home and does most household tasks. His nephew works.  Equipment Currently Used at Home: power chair, cane, straight, rollator, walker, rolling    Prior level of function:   Bed Mobility/Transfers: needs device  Grooming: needs device  Bathing: needs device  Upper Body Dressing: needs device  Lower Body Dressing: needs device and assist  Toileting: needs device  Home  "Management Skills: unable to perform  Homemaking Responsibilities: No  Driving License: No  Mode of Transportation: Family  Leisure and Hobbies: "Taking care of my dog"  IADL Comments: Pt has been needing increasing assistance from his nephew for LB dressing and bathing and has been using a scooter the past week and also a cane for mobility. Pt reports being tired all the time. His nephew and sister, who is blind, do all household tasks.     Dominant hand: right    Subjective:  Communicated with nurse Aarti prior to session.  Stated patient was cleared for OT today.  Chief Complaint: fatigue   Patient/Family stated goals: To be able to get stuff done    Pain/Comfort  Pain Rating 1: 7/10  Location - Side 1: Bilateral  Location 1: lumbar spine  Pain Addressed 1: Reposition, Distraction, Cessation of Activity (Pt stated his back always hurts due to an old injury.)  Pain Rating Post-Intervention 1: 5/10    Objective:  Patient found with: oxygen, telemetry    Cognitive Exam:  Oriented to: Person, Place, Time and Situation  Follows Commands/attention: Follows two-step commands  Communication: clear/fluent  Memory:  No Deficits noted  Safety awareness/insight to disability: impaired for walker use  Coping skills/emotional control: Appropriate to situation    Visual/perceptual:  Intact    Physical Exam:  Postural examination/scapula alignment: Rounded shoulder, Head forward and Posterior pelvic tilt  Skin integrity: Dry  Edema: None noted     Sensation:   Pt reports tingling in ulnar distribution of both arms/hands    Upper Extremity Range of Motion:  Right Upper Extremity: WFL  Left Upper Extremity: WFL    Upper Extremity Strength:  Right Upper Extremity: 4-/5  Left Upper Extremity: 4-/5   Strength: WFL    Fine motor coordination:   Intact    Gross motor coordination: WFL    Functional Mobility:  Bed Mobility:  Rolling/Turning to Left: Stand by assistance, With side rail  Rolling/Turning Right: Stand by assistance, " With side rail  Scooting/Bridging: Stand by Assistance, With side rail  Supine to Sit: Stand by Assistance, WIth side rail  Sit to Supine: Stand by Assistance, With side rail   Pt c/o brief episode of dizziness upon sitting at EOB from supine.    Transfers:  Sit <> Stand Assistance: Contact Guard Assistance (cues for hand placement)  Sit <> Stand Assistive Device: Rolling Walker    Functional Ambulation: Pt took 4 steps at bedside with CGA using walker & 3 L O2 via NC with no LOB but had to sit due to fatigue.  BP in supine 128/64 & HR 69; BP after sitting at EOB & doing UE exercises was 142/70 & HR 75.    Activities of Daily Living:  All tasks required extra time due to pt needing to take rest breaks in supine.  Feeding Level of Assistance: Independent    UE Dressing Level of Assistance: Set-up Assistance    LE Dressing Level of Assistance: Total assistance (Pt would benefit from adaptive equipment.)    Grooming Position: EOB  Grooming Level of Assistance: Supervision    Balance:   Static Sit: NORMAL: No deviations seen in posture held statically  Dynamic Sit: GOOD-: Maintains balance through MODERATE excursions of active trunk movement,     Static Stand: FAIR: Maintains without assist but unable to take challenges  Dynamic stand: FAIR: Needs CONTACT GUARD during gait    Therapeutic Activities and Exercises:  OT ed pt on OT role & POC as well as discharge recommendations.  OT ed pt on adjusting resting position of BUE's in various postures to decrease compression of ulnar nerve.   OT ed pt on fall risk and strongly advised pt to call for help for all OOB mobility.  OT ed patient on safety with walker use for functional mobility with cues for hand placement & sequencing.   OT issued yellow theraband to pt & educated pt on B UE resistive HEP. Pt performed B UE resistive therapeutic exercise x 10 reps each shoulder flexion/extension &  with pt unable to complete rest of exercises due to fatigue & SOB. OT initiated  "education with patient on energy conservation techniques to reduce demand on cardiovascular system. OT focused on activity pacing, work simplification & use of adaptive equipment to reduce fatigue & SOB.  OT ed pt on use of pursed lip breathing & activity pacing  to prevent SOB & fatigue with activity.    AM-PAC 6 CLICK ADL  How much help from another person does this patient currently need?  1 = Unable, Total/Dependent Assistance  2 = A lot, Maximum/Moderate Assistance  3 = A little, Minimum/Contact Guard/Supervision  4 = None, Modified [place]/Independent    Putting on and taking off regular lower body clothing? : 2  Bathing (including washing, rinsing, drying)?: 2  Toileting, which includes using toilet, bedpan, or urinal? : 3  Putting on and taking off regular upper body clothing?: 3  Taking care of personal grooming such as brushing teeth?: 3  Eating meals?: 4  Total Score: 17    AM-PAC Raw Score CMS "G-Code Modifier Level of Impairment Assistance   6 % Total / Unable   7 - 9 CM 80 - 100% Maximal Assist   10-14 CL 60 - 80% Moderate Assist   15 - 19 CK 40 - 60% Moderate Assist   20 - 22 CJ 20 - 40% Minimal Assist   23 CI 1-20% SBA / CGA   24 CH 0% Independent/ Mod I       Patient left supine with all lines intact, call button in reach, bed alarm on and Aarti, nurse notified    Assessment:  Boni Lerner is a 71 y.o. male with a medical diagnosis of Slurring of speech and presents with poor activity tolerance with a significant recent decline in functional status, impacting ADLs and functional mobility.  Recommend OT treatment to maximize endurance, safety & independence with ADLs.    Rehab identified problem list/impairments: Rehab identified problem list/impairments: weakness, impaired self care skills, impaired balance, decreased safety awareness, impaired endurance, impaired functional mobilty, gait instability, decreased lower extremity function, pain, impaired joint extensibility, impaired " cardiopulmonary response to activity    Rehab potential is good.    Activity tolerance: Poor    Discharge recommendations: Discharge Facility/Level Of Care Needs: nursing facility, skilled     Barriers to discharge: Barriers to Discharge: Decreased caregiver support    Equipment recommendations: grab bar, shower chair     GOALS:    Occupational Therapy Goals        Problem: Occupational Therapy Goal    Goal Priority Disciplines Outcome Interventions   Occupational Therapy Goal     OT, PT/OT Ongoing (interventions implemented as appropriate)    Description:  Goals to be met by: 6/18/17     Patient will increase functional independence with ADLs by performing:    LE Dressing with Set-up Assistance, Contact Guard Assistance and Assistive Devices as needed.  Toileting from bedside commode with Supervision, Set-up Assistance and Assistive Devices as needed for hygiene and clothing management.   Toilet transfer to bedside commode with Supervision.  Upper extremity exercise program x10 reps per handout, with assistance as needed.  Pt to utilize energy conservation techniques during ADL task performance with occasional cues.                      PLAN:  Patient to be seen 3 x/week, 4 x/week to address the above listed problems via self-care/home management, therapeutic activities, therapeutic exercises  Plan of Care expires: 06/18/17  Plan of Care reviewed with: patient         RANJAN Silvestre  06/08/2017

## 2017-06-08 NOTE — PLAN OF CARE
Problem: Patient Care Overview  Goal: Plan of Care Review  Patient not in any distress, sats 98% on 3lpm

## 2017-06-09 VITALS
RESPIRATION RATE: 18 BRPM | SYSTOLIC BLOOD PRESSURE: 138 MMHG | DIASTOLIC BLOOD PRESSURE: 63 MMHG | OXYGEN SATURATION: 99 % | HEIGHT: 71 IN | BODY MASS INDEX: 16.75 KG/M2 | TEMPERATURE: 98 F | HEART RATE: 64 BPM | WEIGHT: 119.63 LBS

## 2017-06-09 LAB
ANION GAP SERPL CALC-SCNC: 6 MMOL/L
BASOPHILS # BLD AUTO: 0 K/UL
BASOPHILS NFR BLD: 0.1 %
BUN SERPL-MCNC: 30 MG/DL
CALCIUM SERPL-MCNC: 8.5 MG/DL
CHLORIDE SERPL-SCNC: 106 MMOL/L
CO2 SERPL-SCNC: 28 MMOL/L
CREAT SERPL-MCNC: 1 MG/DL
DIFFERENTIAL METHOD: ABNORMAL
EOSINOPHIL # BLD AUTO: 0 K/UL
EOSINOPHIL NFR BLD: 0 %
ERYTHROCYTE [DISTWIDTH] IN BLOOD BY AUTOMATED COUNT: 15.2 %
EST. GFR  (AFRICAN AMERICAN): >60 ML/MIN/1.73 M^2
EST. GFR  (NON AFRICAN AMERICAN): >60 ML/MIN/1.73 M^2
GLUCOSE SERPL-MCNC: 112 MG/DL
HCT VFR BLD AUTO: 34 %
HGB BLD-MCNC: 11.2 G/DL
INR PPP: 1.1
LYMPHOCYTES # BLD AUTO: 1.2 K/UL
LYMPHOCYTES NFR BLD: 11.8 %
MCH RBC QN AUTO: 33.1 PG
MCHC RBC AUTO-ENTMCNC: 32.8 %
MCV RBC AUTO: 101 FL
MONOCYTES # BLD AUTO: 0.3 K/UL
MONOCYTES NFR BLD: 3.3 %
NEUTROPHILS # BLD AUTO: 8.4 K/UL
NEUTROPHILS NFR BLD: 84.8 %
PLATELET # BLD AUTO: 165 K/UL
PMV BLD AUTO: 8.3 FL
POTASSIUM SERPL-SCNC: 4.2 MMOL/L
PROTHROMBIN TIME: 11.6 SEC
RBC # BLD AUTO: 3.37 M/UL
SODIUM SERPL-SCNC: 140 MMOL/L
WBC # BLD AUTO: 9.9 K/UL

## 2017-06-09 PROCEDURE — 85025 COMPLETE CBC W/AUTO DIFF WBC: CPT

## 2017-06-09 PROCEDURE — 36415 COLL VENOUS BLD VENIPUNCTURE: CPT

## 2017-06-09 PROCEDURE — 97161 PT EVAL LOW COMPLEX 20 MIN: CPT

## 2017-06-09 PROCEDURE — 94761 N-INVAS EAR/PLS OXIMETRY MLT: CPT

## 2017-06-09 PROCEDURE — 94640 AIRWAY INHALATION TREATMENT: CPT

## 2017-06-09 PROCEDURE — G8979 MOBILITY GOAL STATUS: HCPCS | Mod: CJ

## 2017-06-09 PROCEDURE — 63600175 PHARM REV CODE 636 W HCPCS: Performed by: EMERGENCY MEDICINE

## 2017-06-09 PROCEDURE — 25000242 PHARM REV CODE 250 ALT 637 W/ HCPCS: Performed by: INTERNAL MEDICINE

## 2017-06-09 PROCEDURE — C9113 INJ PANTOPRAZOLE SODIUM, VIA: HCPCS | Performed by: EMERGENCY MEDICINE

## 2017-06-09 PROCEDURE — G0378 HOSPITAL OBSERVATION PER HR: HCPCS

## 2017-06-09 PROCEDURE — G8978 MOBILITY CURRENT STATUS: HCPCS | Mod: CK

## 2017-06-09 PROCEDURE — 80048 BASIC METABOLIC PNL TOTAL CA: CPT

## 2017-06-09 PROCEDURE — 27000221 HC OXYGEN, UP TO 24 HOURS

## 2017-06-09 PROCEDURE — 85610 PROTHROMBIN TIME: CPT

## 2017-06-09 PROCEDURE — 25000003 PHARM REV CODE 250: Performed by: NURSE PRACTITIONER

## 2017-06-09 PROCEDURE — 63600175 PHARM REV CODE 636 W HCPCS: Performed by: INTERNAL MEDICINE

## 2017-06-09 PROCEDURE — 99217 PR OBSERVATION CARE DISCHARGE: CPT | Mod: ,,, | Performed by: INTERNAL MEDICINE

## 2017-06-09 PROCEDURE — 25000003 PHARM REV CODE 250: Performed by: EMERGENCY MEDICINE

## 2017-06-09 PROCEDURE — 25000003 PHARM REV CODE 250: Performed by: INTERNAL MEDICINE

## 2017-06-09 RX ORDER — WARFARIN 2.5 MG/1
2.5 TABLET ORAL DAILY
Status: DISCONTINUED | OUTPATIENT
Start: 2017-06-09 | End: 2017-06-09 | Stop reason: HOSPADM

## 2017-06-09 RX ORDER — PANTOPRAZOLE SODIUM 40 MG/1
40 TABLET, DELAYED RELEASE ORAL DAILY
Qty: 30 TABLET | Refills: 0 | Status: ON HOLD | OUTPATIENT
Start: 2017-06-09 | End: 2017-08-30 | Stop reason: ALTCHOICE

## 2017-06-09 RX ORDER — DOXYCYCLINE 100 MG/1
100 CAPSULE ORAL 2 TIMES DAILY
Qty: 14 CAPSULE | Refills: 0 | Status: SHIPPED | OUTPATIENT
Start: 2017-06-09 | End: 2017-06-19

## 2017-06-09 RX ORDER — FOLIC ACID 1 MG/1
1 TABLET ORAL DAILY
Qty: 30 TABLET | Refills: 0 | Status: ON HOLD | OUTPATIENT
Start: 2017-06-09 | End: 2017-08-30 | Stop reason: ALTCHOICE

## 2017-06-09 RX ORDER — METHYLPREDNISOLONE 4 MG/1
TABLET ORAL
Qty: 21 TABLET | Refills: 0 | Status: ON HOLD | OUTPATIENT
Start: 2017-06-09 | End: 2017-08-30 | Stop reason: ALTCHOICE

## 2017-06-09 RX ORDER — ENOXAPARIN SODIUM 100 MG/ML
1 INJECTION SUBCUTANEOUS ONCE
Status: COMPLETED | OUTPATIENT
Start: 2017-06-09 | End: 2017-06-09

## 2017-06-09 RX ADMIN — NICOTINE 1 PATCH: 21 PATCH, EXTENDED RELEASE TRANSDERMAL at 10:06

## 2017-06-09 RX ADMIN — GABAPENTIN 900 MG: 300 CAPSULE ORAL at 05:06

## 2017-06-09 RX ADMIN — TOPIRAMATE 50 MG: 25 TABLET, FILM COATED ORAL at 10:06

## 2017-06-09 RX ADMIN — METHYLPREDNISOLONE SODIUM SUCCINATE 80 MG: 125 INJECTION, POWDER, FOR SOLUTION INTRAMUSCULAR; INTRAVENOUS at 10:06

## 2017-06-09 RX ADMIN — SODIUM CHLORIDE, PRESERVATIVE FREE 3 ML: 5 INJECTION INTRAVENOUS at 05:06

## 2017-06-09 RX ADMIN — IPRATROPIUM BROMIDE AND ALBUTEROL SULFATE 3 ML: .5; 3 SOLUTION RESPIRATORY (INHALATION) at 08:06

## 2017-06-09 RX ADMIN — FLUTICASONE FUROATE AND VILANTEROL TRIFENATATE 1 PUFF: 100; 25 POWDER RESPIRATORY (INHALATION) at 08:06

## 2017-06-09 RX ADMIN — SENNOSIDES 8.6 MG: 8.6 TABLET, FILM COATED ORAL at 10:06

## 2017-06-09 RX ADMIN — SUCRALFATE 1 G: 1 TABLET ORAL at 11:06

## 2017-06-09 RX ADMIN — VALACYCLOVIR HYDROCHLORIDE 500 MG: 500 TABLET, FILM COATED ORAL at 10:06

## 2017-06-09 RX ADMIN — CLOPIDOGREL BISULFATE 75 MG: 75 TABLET ORAL at 10:06

## 2017-06-09 RX ADMIN — SODIUM CHLORIDE, PRESERVATIVE FREE 3 ML: 5 INJECTION INTRAVENOUS at 01:06

## 2017-06-09 RX ADMIN — Medication 12.5 MG: at 10:06

## 2017-06-09 RX ADMIN — GABAPENTIN 900 MG: 300 CAPSULE ORAL at 01:06

## 2017-06-09 RX ADMIN — ENOXAPARIN SODIUM 50 MG: 60 INJECTION SUBCUTANEOUS at 10:06

## 2017-06-09 RX ADMIN — FOLIC ACID 1 MG: 1 TABLET ORAL at 10:06

## 2017-06-09 RX ADMIN — SUCRALFATE 1 G: 1 TABLET ORAL at 05:06

## 2017-06-09 RX ADMIN — PANTOPRAZOLE SODIUM 40 MG: 40 INJECTION, POWDER, FOR SOLUTION INTRAVENOUS at 10:06

## 2017-06-09 RX ADMIN — FAMOTIDINE 20 MG: 20 TABLET ORAL at 10:06

## 2017-06-09 NOTE — PLAN OF CARE
Problem: Patient Care Overview  Goal: Plan of Care Review  Outcome: Ongoing (interventions implemented as appropriate)  Pt aaox4. Pt use urinal at bedside, up in wheelchair with assist(whelchair at bedside). 20g right upper arm flushed and saline locked. Telemetry box #4914 NSR, Pt on cardiac diet with boost, HOB to remain elevated, neuro checks q2h. Pt remained afebrile during shift and free of skin breakdown. O2 3L NC, daily weights. Remained afebrile and free of falls during shift. Bed and wheels locked, call light in reach. Will continue to monitor pt during hospital stay.

## 2017-06-09 NOTE — PLAN OF CARE
Problem: Patient Care Overview  Goal: Plan of Care Review  POC reviewed with pt, pt verbalized understanding. Safety maintained throughout shift, bed locked and in lowest position, call light in reach, Side rails up X 2. Non skid sock on when OOB. Pt remained free of fall/trauma. Pt denies pain. VSS, pt remained afebrile this shift. Pt tolerating meals wells, no reports of nausea and vomiting. IV antibiotics initiated this shift.  Telemetry placed. Pt refused to work with PT this morning and afternoon. Educated pt on smoking cessation, pt refuses to stop smoking. Pt traveled downstairs with help of nephew to smoke, Nicotine patch removed. Will continue to monitor.

## 2017-06-09 NOTE — NURSING
"Pt continues to denies blurred vision, states "i just need glasses to read, d/c instruction, Rx and f/u appointments given to pt, states "understanding," h/l and telemetry removed, pt tolerated well, denies joint pain/discomfort prior to d/c, to home per private vehicle at 1404, personal belongings packed per pt, escorted by staff  "

## 2017-06-09 NOTE — PT/OT/SLP EVAL
"Physical Therapy  Evaluation    Boni Lerner   MRN: [id number]   Admitting Diagnosis: Slurring of speech    PT Received On: 06/09/17  PT Start Time: 1010     PT Stop Time: 1026    PT Total Time (min): 16 min       Billable Minutes:  Evaluation 16    Diagnosis: Slurring of speech  Functional deficit/endurance deficit    Past Medical History:   Diagnosis Date    Arthritis     Asbestos exposure     COPD (chronic obstructive pulmonary disease)     O2 AT NITE PRN    Coronary artery disease     STENT X 1    Depression     HIV (human immunodeficiency virus infection)     Hypertension     NO MED NOW    Migraines     Myocardial infarction     Presence of dental bridge     UPPER AND LOWER    RLS (restless legs syndrome)     Wears glasses       Past Surgical History:   Procedure Laterality Date    CORONARY STENT PLACEMENT      FACIAL FRACTURE SURGERY  metal in left brow and cheek    1970's    finger scraped Right     index finger scraped at [facility] for staph infection    rectal warts  2013    warts removed         Referring physician: Dr Amato  Date referred to PT: 6/8/2017    General Precautions: Standard, fall, respiratory  Orthopedic Precautions: N/A   Braces: N/A       Do you have any cultural, spiritual, Holiness conflicts, given your current situation?: No    Patient History:  Lives With: other relative(s) (nephew)  Living Arrangements: house  Home Accessibility:  (No concerns)  Equipment Currently Used at Home: 3-in-1 commode, bedside commode, CPAP, oxygen, respiratory supplies, wheelchair (per chart)  DME owned (not currently used): Unknown    Previous Level of Function:  Ambulation Skills: needs device  Transfer Skills: needs device  ADL Skills: needs device and assist  Work/Leisure Activity: needs device and assist    Subjective:  Communicated with Nurse Alanis prior to session.  Cleared for therapy  Chief Complaint: Fatigue  Patient goals: N/A.  "I don't need to do this"  Pt able to be coaxed " into participating in PT eval.      Pain/Comfort  Pain Rating 1:  (None reported this date. )      Objective:   Patient found with: oxygen, telemetry, peripheral IV     Cognitive Exam:  Oriented to: Person    Follows Commands/attention: Follows one-step commands  Communication: Somewhat difficult to understand  Safety awareness/insight to disability: impaired    Physical Exam:  Postural examination/scapula alignment: Rounded shoulder and Head forward    Skin integrity: fragile  Edema: None noted     Sensation:   No deficits noted this date    Upper Extremity Range of Motion:  Right Upper Extremity: Not assessed  Left Upper Extremity: Not assessed    Upper Extremity Strength:  Right Upper Extremity: Not assessed  Left Upper Extremity: Not assessed    Lower Extremity Range of Motion:  Right Lower Extremity: WFL  Left Lower Extremity: WFL    Lower Extremity Strength:  Right Lower Extremity: WFL  Left Lower Extremity: WFL     Fine motor coordination:  Not tested this date    Gross motor coordination: WFL    Functional Mobility:  Bed Mobility:   CGA for rolling and supine <> sit and Min assist for supine <> sit    Transfers:   CGA for sit <> stand    Gait:  Not assessed this date due to pt declining activity.  However, pt able to march in place x 3 steps with HHA of 1.         Stairs:  Not tested    Balance:   Static Sit: FAIR: Maintains without assist, but unable to take any challenges   Dynamic Sit: FAIR+: Maintains balance through MINIMAL excursions of active trunk motion  Static Stand: POOR+: Needs MINIMAL assist to maintain  Dynamic stand: POOR: N/A    Therapeutic Activities and Exercises:  Transferred from supine to sitting on EOB with verbal and tactile cues for hand placement.  Sit<>stand with CGA and verbal cues for technique.  Stood only x 1 min. Then returned to bed.      AM-PAC 6 CLICK MOBILITY  How much help from another person does this patient currently need?   1 = Unable, Total/Dependent Assistance  2 = A  lot, Maximum/Moderate Assistance  3 = A little, Minimum/Contact Guard/Supervision  4 = None, Modified [place]/Independent    Turning over in bed (including adjusting bedclothes, sheets and blankets)?: 3  Sitting down on and standing up from a chair with arms (e.g., wheelchair, bedside commode, etc.): 3  Moving from lying on back to sitting on the side of the bed?: 3  Moving to and from a bed to a chair (including a wheelchair)?: 2  Need to walk in hospital room?: 2  Climbing 3-5 steps with a railing?: 2  Total Score: 15     AM-PAC Raw Score CMS G-Code Modifier Level of Impairment Assistance   6 % Total / Unable   7 - 9 CM 80 - 100% Maximal Assist   10 - 14 CL 60 - 80% Moderate Assist   15 - 19 CK 40 - 60% Moderate Assist   20 - 22 CJ 20 - 40% Minimal Assist   23 CI 1-20% SBA / CGA   24 CH 0% Independent/ Mod I     Patient left supine with all lines intact, call button in reach and Annabelle notified.    Assessment:   Boni Lerner is a 71 y.o. male with a medical diagnosis of Slurring of speech and presents with impaired functional mobility.    Rehab identified problem list/impairments: Rehab identified problem list/impairments: weakness, impaired endurance, impaired self care skills, impaired functional mobilty, gait instability, impaired balance    Rehab potential is fair. Limited by decreased willingness to participate.     Activity tolerance: Poor    Discharge recommendations: Discharge Facility/Level Of Care Needs: nursing facility, skilled     Barriers to discharge: Barriers to Discharge: Decreased caregiver support    Equipment recommendations:       GOALS:    Physical Therapy Goals        Problem: Physical Therapy Goal    Goal Priority Disciplines Outcome Goal Variances Interventions   Physical Therapy Goal     PT/OT, PT Ongoing (interventions implemented as appropriate)     Description:  Goals to be met by: 06/16/2017     Patient will increase functional independence with mobility by  performin. Supine to sit with Modified [place]  2. Sit to supine with Modified [place]  3. Sit to stand transfer with Stand-by Assistance  4. Bed to chair transfer with Contact Guard Assistance using to be determined  5. Gait  To be assessed as patient cooperates with further activity.                     PLAN:    Patient to be seen 6 x/week to address the above listed problems via gait training, therapeutic activities, therapeutic exercises  Plan of Care expires: 17  Plan of Care reviewed with: patient    Functional Assessment Tool Used: Am-pac  Score: 15  Functional Limitation: Mobility: Walking and moving around  Mobility: Walking and Moving Around Current Status (): CK  Mobility: Walking and Moving Around Goal Status (): REBECA Pitt, PT  2017

## 2017-06-09 NOTE — PLAN OF CARE
06/09/17 0802   Patient Assessment/Suction   Level of Consciousness (AVPU) alert   Respiratory Effort Unlabored   All Lung Fields Breath Sounds coarse;diminished;wheezes, expiratory   PRE-TX-O2-ETCO2   O2 Device (Oxygen Therapy) nasal cannula   $ Is the patient on Oxygen? Yes   Flow (L/min) 3   Oxygen Concentration (%) 32   SpO2 96 %   Pulse Oximetry Type Intermittent   $ Pulse Oximetry - Multiple Charge Pulse Oximetry - Multiple   Pulse 72   Resp 18   Aerosol Therapy   $ Aerosol Therapy Charges Aerosol Treatment   Respiratory Treatment Status given   SVN/Inhaler Treatment Route mask   Position During Treatment HOB at 45 degrees   Patient Tolerance good   Post-Treatment   Post-treatment Heart Rate (beats/min) 72   Post-treatment Resp Rate (breaths/min) 18   All Fields Breath Sounds aeration increased   Ready to Wean/Extubation Screen   FIO2<60 (chart decimal) 0.32

## 2017-06-09 NOTE — PLAN OF CARE
Problem: Physical Therapy Goal  Goal: Physical Therapy Goal  Goals to be met by: 2017     Patient will increase functional independence with mobility by performin. Supine to sit with Modified [place]  2. Sit to supine with Modified [place]  3. Sit to stand transfer with Stand-by Assistance  4. Bed to chair transfer with Contact Guard Assistance using to be determined  5. Gait  To be assessed as patient cooperates with further activity.   Outcome: Ongoing (interventions implemented as appropriate)  PT eval completed.  Bed mobility and transfers with CGA-Min A; however, pt deferred participating in evaluation of bed<>chair transfers and gait.

## 2017-06-09 NOTE — PLAN OF CARE
06/09/17 0810   PRE-TX-O2-ETCO2   Pulse 72   Resp 18   Inhaler   $ Inhaler Charges MDI (Metered Dose Inahler) Treatment;Mouth rinsed post treatment   Respiratory Treatment Status given   SVN/Inhaler Treatment Route mouthpiece   Patient Tolerance good   Post-Treatment   Post-treatment Heart Rate (beats/min) 72   Post-treatment Resp Rate (breaths/min) 18

## 2017-06-09 NOTE — PLAN OF CARE
Discharge planner sent home health referral to [facility] via [facility] system, awaiting approval. Jose, SSC

## 2017-06-09 NOTE — DISCHARGE SUMMARY
Discharge Summary  Hospital Medicine    Admit Date: 6/7/2017    Date and Time: 6/9/201712:25 PM    Discharge Attending Physician: Analilia Nolasco MD    Primary Care Physician: Gilberto Rosas MD    Diagnoses:  Active Hospital Problems    Diagnosis  POA    *Slurring of speech [R47.81]  Yes    Moderate malnutrition [E44.0]  Yes    COPD with acute exacerbation [J44.1]  Yes    Chronic obstructive pulmonary disease with acute exacerbation [J44.1]  Yes    CAD (coronary artery disease) [I25.10]  Yes    HIV (human immunodeficiency virus infection) [Z21]  Yes      Resolved Hospital Problems    Diagnosis Date Resolved POA   No resolved problems to display.     Discharged Condition: Good    Hospital Course:   Patient is a 71 y.o. male admitted to Hospitalist Service from [facility] Emergency Room with complaint of slurring of speech for a couple of week. Patient has PMH significant for HIV, CAD s/p stent placement, hypertension, COPD. Part of the history obtained from patient's nephew. Patient reported progressively worsening SOB for almost 1 week with associated yellow green productive cough  Without any associated fever. Patient recently started on Requip by PCP for tremors. Patient presented with slurred speech and double vision that started approximately 2-3 weeks ago when he began Requip. He stopped the medicine and symptoms slightly improved but then he restarted approximately week ago as well as began taking Mucinex and symptoms of return.  His nephew, with whom he lives, states he has slurred speech and slight dizziness.  He denied any focal weakness or numbness in his extremities, difficulty swallowing,. Patient denied chest pain, abdominal pain, nausea, vomiting, headache, vision changes, focal neuro-deficits, cough or fever.  Patient was admitted to Hospitalist medicine service. Patient was treated with IV antibiotics, IV Solumedrol therapy and beta 2 agonist nebulization treatments with improved  symptoms. Patient placed on tele-monitoring and routine neuro-checks. Neuro-imaging reviewed, results below. No evidence of acute CVA noted, symptoms improved. Patient worked with ST/PT/OT. Patient advised to stop Requip use. Symptoms improved. Smoking cessation counseling performed. Dangers of cigarette smoking were reviewed with patient in detail and patient was encouraged to quit. Nicotine replacement options were discussed for > 3 minutes. Patient also treated for Coumadin toxicity and required use of Vit. K. Home health is being arranged with ST, PT and OT.  Patient was discharged home in stable condition with following discharge plan of care.     Consults: None    Significant Diagnostic Studies:   Chest X-Ray: COPD without acute process or significant change.     CT Head: No acute intracranial process.   Chronic left basal ganglia lacunar infarct.  Left maxillary sinus disease.     MRA of head and neck:  Negative MRA of the carotid and vertebral arteries     MRI brain: Mild brain atrophy with deep white matter ischemic changes.  Probable old ischemic injury of the left internal capsule without hemorrhage or mass effect.  No acute lesions identified on diffusion weighting.  Incidentally noted is left maxillary sinusitis.    Microbiology Results (last 7 days)     ** No results found for the last 168 hours. **        Special Treatments/Procedures: None  Disposition: Home or Self Care    Medications:  Reconciled Home Medications: Current Discharge Medication List      START taking these medications    Details   doxycycline (VIBRAMYCIN) 100 MG Cap Take 1 capsule (100 mg total) by mouth 2 (two) times daily.  Qty: 14 capsule, Refills: 0      folic acid (FOLVITE) 1 MG tablet Take 1 tablet (1 mg total) by mouth once daily.  Qty: 30 tablet, Refills: 0      pantoprazole (PROTONIX) 40 MG tablet Take 1 tablet (40 mg total) by mouth once daily.  Qty: 30 tablet, Refills: 0         CONTINUE these medications which have  CHANGED    Details   methylPREDNISolone (MEDROL DOSEPACK) 4 mg tablet follow package directions  Qty: 21 tablet, Refills: 0         CONTINUE these medications which have NOT CHANGED    Details   acetaminophen (TYLENOL) 500 MG tablet Take 500 mg by mouth every 6 (six) hours as needed. 2 TABS      albuterol (ACCUNEB) 0.63 mg/3 mL Nebu Take 0.63 mg by nebulization 4 (four) times daily as needed.       budesonide-formoterol 80-4.5 mcg (SYMBICORT) 80-4.5 mcg/actuation HFAA Inhale 2 puffs into the lungs.      clopidogrel (PLAVIX) 75 mg tablet Take 75 mg by mouth once daily.      cyproheptadine (PERIACTIN) 4 mg tablet Take 4 mg by mouth 3 (three) times daily as needed.      efavirenz-emtrictabine-tenofovir 600-200-300 mg (ATRIPLA) 600-200-300 mg Tab Take 1 tablet by mouth every evening.       ergocalciferol (ERGOCALCIFEROL) 50,000 unit Cap Take 50,000 Units by mouth every 7 days. Takes on Mondays      famotidine (PEPCID) 20 MG tablet Take 1 tablet (20 mg total) by mouth 2 (two) times daily.  Qty: 20 tablet, Refills: 1      fluoxetine (PROZAC) 40 MG capsule Take 40 mg by mouth nightly.       gabapentin (NEURONTIN) 800 MG tablet Take 900 mg by mouth 3 (three) times daily.       hydrocodone-acetaminophen (VICODIN ES) 7.5-300 mg Tab Take by mouth.      metoprolol tartrate (LOPRESSOR) 25 MG tablet 12.5 mg 2 (two) times daily.   Refills: 3      morphine (MSIR) 15 MG tablet Take 15 mg by mouth every 2 (two) hours as needed for Pain.      nitroGLYCERIN (NITROSTAT) 0.3 MG SL tablet Place 0.3 mg under the tongue every 5 (five) minutes as needed for Chest pain.      ondansetron (ZOFRAN-ODT) 4 MG TbDL Take 8 mg by mouth once.      sucralfate (CARAFATE) 1 gram tablet Take 1 tablet (1 g total) by mouth 4 (four) times daily.  Qty: 30 tablet, Refills: 0      tiotropium (SPIRIVA) 18 mcg inhalation capsule Inhale 18 mcg into the lungs once daily.      topiramate (TOPAMAX) 50 MG tablet Take 50 mg by mouth 2 (two) times daily. 2 TABS HS       tramadol (ULTRAM) 50 mg tablet Take 50 mg by mouth every 6 (six) hours as needed for Pain.      valacyclovir (VALTREX) 500 MG tablet Take 500 mg by mouth 2 (two) times daily.      warfarin (COUMADIN) 2.5 MG tablet Take 2.5 mg by mouth Daily.   Refills: 2      albuterol-ipratropium 2.5mg-0.5mg/3mL (DUO-NEB) 0.5 mg-3 mg(2.5 mg base)/3 mL nebulizer solution Take 3 mLs by nebulization every 6 (six) hours as needed for Wheezing.  Qty: 25 vial, Refills: 0         STOP taking these medications       dronabinol (MARINOL) 5 MG capsule Comments:   Reason for Stopping:           Diet: Cardiac Coumadin restricted    Activity: Fall precautions, ad lisette    Discharge Procedure Orders  Ambulatory referral to Home Health   Referral Priority: Routine Referral Type: Home Health   Referral Reason: Specialty Services Required    Requested Specialty: Home Health Services    Number of Visits Requested: 1        Follow-up Information     Gilberto Rosas MD In 1 week.    Specialty:  Family Medicine  Contact information:  [address] [facility] [address]  [phone]             Please follow up.    Contact information:  Please do not smoke cigarettes. Continue supplemental oxygen use as before.

## 2017-06-09 NOTE — NURSING
"Resting quietly with eyes open, while speaking to pt i was using a louder tone, pt states "i'm not [place] you can use your regular voice, pt express going to smoke, extensive teaching performed on nicotine patch, pt states 'i had that before that does not help and it will not do anything to me if i smoke with it on," teaching reilerated, shahram equal hand grasps, gustabo, pt is alert and is able answer simple questions and follow simple commands, 02 3L NC in progress, sat 99%, <3 seconds capillary refills, expiratory wheezing auscultated, pt is encourage to cough and deep breathe, continue to monitor, observe and note any changes  "

## 2017-06-09 NOTE — PLAN OF CARE
Received called from Brit kowalski/ [facility] requesting corrected home health orders, resent home health referral to [facility]. Jose, SSC

## 2017-06-10 NOTE — PLAN OF CARE
06/10/17 0952   Final Note   Assessment Type Final Discharge Note   Discharge Disposition Home-Health   Discharge planning education complete? Yes

## 2017-08-20 ENCOUNTER — HOSPITAL ENCOUNTER (EMERGENCY)
Facility: HOSPITAL | Age: 72
Discharge: LEFT BEFORE DISCHARGE | End: 2017-08-20
Attending: EMERGENCY MEDICINE
Payer: MEDICARE

## 2017-08-20 VITALS — RESPIRATION RATE: 22 BRPM | HEART RATE: 72 BPM | OXYGEN SATURATION: 100 %

## 2017-08-20 DIAGNOSIS — R06.02 SHORTNESS OF BREATH: Primary | ICD-10-CM

## 2017-08-20 LAB
ALBUMIN SERPL BCP-MCNC: 2.7 G/DL
ALP SERPL-CCNC: 99 U/L
ALT SERPL W/O P-5'-P-CCNC: 5 U/L
ANION GAP SERPL CALC-SCNC: 7 MMOL/L
AST SERPL-CCNC: 12 U/L
BASOPHILS # BLD AUTO: 0 K/UL
BASOPHILS NFR BLD: 0.5 %
BILIRUB SERPL-MCNC: 0.2 MG/DL
BUN SERPL-MCNC: 19 MG/DL
CALCIUM SERPL-MCNC: 9.2 MG/DL
CHLORIDE SERPL-SCNC: 106 MMOL/L
CO2 SERPL-SCNC: 31 MMOL/L
CREAT SERPL-MCNC: 0.8 MG/DL
DIFFERENTIAL METHOD: ABNORMAL
EOSINOPHIL # BLD AUTO: 0.2 K/UL
EOSINOPHIL NFR BLD: 3.2 %
ERYTHROCYTE [DISTWIDTH] IN BLOOD BY AUTOMATED COUNT: 15 %
EST. GFR  (AFRICAN AMERICAN): >60 ML/MIN/1.73 M^2
EST. GFR  (NON AFRICAN AMERICAN): >60 ML/MIN/1.73 M^2
GLUCOSE SERPL-MCNC: 88 MG/DL
HCT VFR BLD AUTO: 33 %
HGB BLD-MCNC: 10.5 G/DL
INR PPP: 4.6
LYMPHOCYTES # BLD AUTO: 1.7 K/UL
LYMPHOCYTES NFR BLD: 26.5 %
MCH RBC QN AUTO: 31.8 PG
MCHC RBC AUTO-ENTMCNC: 31.9 G/DL
MCV RBC AUTO: 100 FL
MONOCYTES # BLD AUTO: 0.5 K/UL
MONOCYTES NFR BLD: 7.1 %
NEUTROPHILS # BLD AUTO: 4.1 K/UL
NEUTROPHILS NFR BLD: 62.7 %
PLATELET # BLD AUTO: 203 K/UL
PMV BLD AUTO: 7.3 FL
POTASSIUM SERPL-SCNC: 3.6 MMOL/L
PROT SERPL-MCNC: 6.5 G/DL
PROTHROMBIN TIME: 45.5 SEC
RBC # BLD AUTO: 3.31 M/UL
SODIUM SERPL-SCNC: 144 MMOL/L
WBC # BLD AUTO: 6.6 K/UL

## 2017-08-20 PROCEDURE — 80053 COMPREHEN METABOLIC PANEL: CPT

## 2017-08-20 PROCEDURE — 36415 COLL VENOUS BLD VENIPUNCTURE: CPT

## 2017-08-20 PROCEDURE — 85025 COMPLETE CBC W/AUTO DIFF WBC: CPT

## 2017-08-20 PROCEDURE — 85610 PROTHROMBIN TIME: CPT

## 2017-08-20 PROCEDURE — 94640 AIRWAY INHALATION TREATMENT: CPT

## 2017-08-20 PROCEDURE — 25000242 PHARM REV CODE 250 ALT 637 W/ HCPCS: Performed by: EMERGENCY MEDICINE

## 2017-08-20 PROCEDURE — 99285 EMERGENCY DEPT VISIT HI MDM: CPT | Mod: 25

## 2017-08-20 RX ORDER — IPRATROPIUM BROMIDE AND ALBUTEROL SULFATE 2.5; .5 MG/3ML; MG/3ML
3 SOLUTION RESPIRATORY (INHALATION)
Status: COMPLETED | OUTPATIENT
Start: 2017-08-20 | End: 2017-08-20

## 2017-08-20 RX ORDER — DOXYCYCLINE 100 MG/1
100 CAPSULE ORAL 2 TIMES DAILY
Qty: 20 CAPSULE | Refills: 0 | Status: ON HOLD | OUTPATIENT
Start: 2017-08-20 | End: 2017-08-30 | Stop reason: HOSPADM

## 2017-08-20 RX ORDER — METHYLPREDNISOLONE SOD SUCC 125 MG
125 VIAL (EA) INJECTION
Status: DISCONTINUED | OUTPATIENT
Start: 2017-08-20 | End: 2017-08-20 | Stop reason: HOSPADM

## 2017-08-20 RX ORDER — PREDNISONE 20 MG/1
40 TABLET ORAL DAILY
Qty: 10 TABLET | Refills: 0 | Status: SHIPPED | OUTPATIENT
Start: 2017-08-20 | End: 2017-08-25

## 2017-08-20 RX ADMIN — IPRATROPIUM BROMIDE AND ALBUTEROL SULFATE 3 ML: .5; 3 SOLUTION RESPIRATORY (INHALATION) at 02:08

## 2017-08-20 NOTE — ED NOTES
Pt refusing to be admitted. Dr Lopez at bedside to discuss risks of leaving AMA. Pt still wishes to leave. AMA form signed and witnessed. Pt wheeled out of ED in NAD in personal wheelchair on home o2. With family member

## 2017-08-20 NOTE — ED PROVIDER NOTES
"Encounter Date: 2017    SCRIBE #1 NOTE: I, Kimberlyn Jimenez, am scribing for, and in the presence of, Dr. Rose.       History     Chief Complaint   Patient presents with    Shortness of Breath     Onset of worsening SOB last night.  On home O2 @ 2.5 L.  Hx of COPD, HIV+, Lung CA.       2017 2:19 PM     Chief Complaint: SOB      Boni Lerner is a 71 y.o. male with a history of COPD, Lung CA,  HIV, MI, CAD and HTN who presents to the ED with complaints of worsening SOB associated with chills since last night.  He reports he has been "feeling sick" since last week. He uses at 2.5L of home oxygen.   Pt denies fever. He states he is compliant to his current medication. Allergens include Aztreonam and Norian. Patient is a bit of a limited historian and requests that I direct questioning towards his nephew Burak.      The history is provided by the patient.     Review of patient's allergies indicates:   Allergen Reactions    Unable to assess Other (See Comments)     NORIAN (DRUG FOR HIV) TAKEN  ALMOST PARALYZED LEGS    Aztreonam Nausea And Vomiting     Past Medical History:   Diagnosis Date    Arthritis     Asbestos exposure     COPD (chronic obstructive pulmonary disease)     O2 AT NITE PRN    Coronary artery disease     STENT X 1    Depression     HIV (human immunodeficiency virus infection)     Hypertension     NO MED NOW    Migraines     Myocardial infarction     Presence of dental bridge     UPPER AND LOWER    RLS (restless legs syndrome)     Wears glasses      Past Surgical History:   Procedure Laterality Date    CORONARY STENT PLACEMENT      FACIAL FRACTURE SURGERY  metal in left brow and cheek    1970's    finger scraped Right     index finger scraped at [facility] main for staph infection    rectal warts  2013    warts removed       Family History   Problem Relation Age of Onset    COPD Mother     Cancer Mother       of lung ca w/mets to stomach    Asbestos Mother     Vision " "loss Sister     Blindness Sister      with bilatereal removal     Depression Sister     Cancer Brother      lung    COPD Brother     Stroke Maternal Aunt     Heart disease Maternal Grandmother      pacemaker    Stroke Maternal Grandmother      Social History   Substance Use Topics    Smoking status: Current Every Day Smoker     Packs/day: 0.50     Years: 50.00     Types: Cigarettes     Last attempt to quit: 1/16/2015    Smokeless tobacco: Never Used      Comment: Pt states "already have all the information"    Alcohol use No     Review of Systems   Constitutional: Positive for chills and fatigue. Negative for fever.   HENT: Negative for sore throat.    Respiratory: Positive for shortness of breath.    Cardiovascular: Negative for chest pain.   Gastrointestinal: Negative for nausea.   Musculoskeletal: Negative for back pain.   Skin: Negative for rash.   Neurological: Negative for weakness.   Hematological: Does not bruise/bleed easily.   All other systems reviewed and are negative.      Physical Exam     Initial Vitals   BP Pulse Resp Temp SpO2   -- -- -- -- --      MAP       --         Physical Exam    Nursing note and vitals reviewed.  Constitutional: He appears well-developed and well-nourished. He is not diaphoretic. He appears cachectic.  Non-toxic appearance. He does not have a sickly appearance. He does not appear ill. No distress.   Appear fatigued  Then chronically ill-appearing male in no acute distress   HENT:   Head: Normocephalic and atraumatic.   Eyes: EOM are normal. Pupils are equal, round, and reactive to light.   Neck: Normal range of motion. Neck supple. Normal range of motion present. No neck rigidity.   Cardiovascular: Normal rate, regular rhythm and normal heart sounds. Exam reveals no gallop and no friction rub.    No murmur heard.  Pulmonary/Chest: No respiratory distress.   Poor air movement bilaterally.    Abdominal: Soft. He exhibits no distension. There is no tenderness. "   Musculoskeletal: Normal range of motion.   Neurological: He is alert and oriented to person, place, and time.   Skin: Skin is warm and dry. No rash noted.   Psychiatric: He has a normal mood and affect. His behavior is normal. Judgment and thought content normal.         ED Course   Procedures  Labs Reviewed - No data to display          Medical Decision Making:   History:   Old Medical Records: I decided to obtain old medical records.  Clinical Tests:   Lab Tests: Ordered and Reviewed  Radiological Study: Ordered and Reviewed            Scribe Attestation:   Scribe #1: I performed the above scribed service and the documentation accurately describes the services I performed. I attest to the accuracy of the note.    Attending Attestation:           Physician Attestation for Scribe:  Physician Attestation Statement for Scribe #1: I, Dr. Rose, reviewed documentation, as scribed by Kimberlyn Jimenez in my presence, and it is both accurate and complete.                 ED Course   Comment By Arya Lopez to admit Deion Rose MD 08/20 3437     Clinical Impression:   No diagnosis found.            71-year-old male with HIV COPD presents to the emergency room for worsening shortness of breath fatigue and weight loss over the last several days to weeks.  I have been seeing Mr. Lerner for several years and well acquainted with him.  In the emergency room he presents frequently for shortness of breath and typically does not look ill.  On this visit however he does appear ill and appears fatigued and I have repeatedly recommended that he be admitted to the hospital.  Nothing I can do will convince him to stay.  He reports that if we keep him and he will not be able to smoke and he does not like our beds.  Hospital medicine was consulted and they cannot convince him to stay.        Advised patient that they need admission to the hospital.  Patient refuses admission to the hospital.  Alert and oriented to person place and  situation.  I explained the risks of worsening condition, death etc in layman's terms to the patient.  Patient voices these risks back to me.   Patient is not altered and is not under the influence.  Patient is welcome to return to the hospital at any time if he chooses to do so.  I will Discharge the patient AGAINST MEDICAL ADVICE.  Seen by hospital medicine who cannot convince him to stay.  He will be discharged on oral steroids and antibiotics.         Deion Rose MD  08/20/17 2184

## 2017-08-20 NOTE — ED NOTES
Pt resting. Has no complaints. Reports improvement in breathing after neb treatments. Will continue to monitor

## 2017-08-20 NOTE — PROGRESS NOTES
Consult Note  Hospital Medicine    Consult Requested By: No att. providers found    Reason for Consult: Admission    SUBJECTIVE:     History of Present Illness: Patient is a 71-year-old  male with a past medical history significant for HIV on current HAART therapy, COPD and chronic lung disease secondary to tobacco dependence who has chronic oxygen dependence on 3-4 L of oxygen daily, who presents to the hospital with increasing shortness of breath over the past 3-4 days.  Patient states that he has an increasing oxygen demand for approximately 4 L to approximately 6 L. He has also developed a productive cough with greenish yellow sputum as well as some low-grade temps loose stools and and nausea during that same time period.  Patient states that this is similar to his previous COPD exacerbations which last time he was seen in the hospital was in .  He states that he is continuing to smoke cigarettes and last cigarette was approximately 3 hours prior to admission.    Past Medical History:   Diagnosis Date    Arthritis     Asbestos exposure     COPD (chronic obstructive pulmonary disease)     O2 AT NITE PRN    Coronary artery disease     STENT X 1    Depression     HIV (human immunodeficiency virus infection)     Hypertension     NO MED NOW    Migraines     Myocardial infarction     Presence of dental bridge     UPPER AND LOWER    RLS (restless legs syndrome)     Wears glasses      Past Surgical History:   Procedure Laterality Date    CORONARY STENT PLACEMENT      FACIAL FRACTURE SURGERY  metal in left brow and cheek    's    finger scraped Right     index finger scraped at [facility] main for staph infection    rectal warts      warts removed       Family History   Problem Relation Age of Onset    COPD Mother     Cancer Mother       of lung ca w/mets to stomach    Asbestos Mother     Vision loss Sister     Blindness Sister      with bilatereal removal     Depression Sister   "   Cancer Brother      lung    COPD Brother     Stroke Maternal Aunt     Heart disease Maternal Grandmother      pacemaker    Stroke Maternal Grandmother      Social History   Substance Use Topics    Smoking status: Current Every Day Smoker     Packs/day: 0.50     Years: 50.00     Types: Cigarettes     Last attempt to quit: 1/16/2015    Smokeless tobacco: Never Used      Comment: Pt states "already have all the information"    Alcohol use No       Review of patient's allergies indicates:   Allergen Reactions    Unable to assess Other (See Comments)     REX (DRUG FOR HIV) TAKEN 1989 ALMOST PARALYZED LEGS    Aztreonam Nausea And Vomiting        Review of Systems   Constitutional: Positive for malaise/fatigue and weight loss. Negative for chills and fever.   HENT: Negative for tinnitus.    Eyes: Negative for blurred vision and photophobia.   Respiratory: Positive for cough, sputum production, shortness of breath and wheezing.    Cardiovascular: Negative for chest pain and orthopnea.   Gastrointestinal: Negative for diarrhea and vomiting.   Genitourinary: Negative for frequency and urgency.   Musculoskeletal: Negative for myalgias and neck pain.   Skin: Negative for itching and rash.   Neurological: Positive for weakness. Negative for tingling and headaches.   Endo/Heme/Allergies: Negative for environmental allergies.           OBJECTIVE:     Vital Signs Range (Last 24H):  Pulse:  [72]   Resp:  [22]   SpO2:  [100 %]     Physical Exam   Constitutional: He is oriented to person, place, and time.   cachectic elderly male who appears older than his stated age who is short of breath at baseline   HENT:   Nose: Nose normal.   Mouth/Throat: Oropharynx is clear and moist.   Eyes: Conjunctivae are normal. Pupils are equal, round, and reactive to light.   Neck: Neck supple. No thyromegaly present.   Cardiovascular: Normal rate and regular rhythm.  Exam reveals no gallop and no friction rub.    No murmur " heard.  Pulmonary/Chest: No respiratory distress. He has wheezes. He has rales.    lungs show very minimal air movement with bilateral wheezing and crackles with rhonchi as well.  Patient has accessory muscles use.  Patient has nasal cannula in place is 92%.  He does appear to be mildly tachypneic.   Abdominal: Soft. He exhibits no distension. There is no tenderness. There is no guarding.   Musculoskeletal: He exhibits no edema or tenderness.   Neurological: He is alert and oriented to person, place, and time. He displays normal reflexes. No cranial nerve deficit. Gait normal.   Skin: No rash noted. He is not diaphoretic. No erythema.   Psychiatric: Affect and judgment normal.   Nursing note and vitals reviewed.      There is no height or weight on file to calculate BMI.    Laboratory:  CBC:   Recent Labs  Lab 08/20/17  1440   WBC 6.60   RBC 3.31*   HGB 10.5*   HCT 33.0*      *   MCH 31.8*   MCHC 31.9*     BMP  Lab Results   Component Value Date     08/20/2017    K 3.6 08/20/2017     08/20/2017    CO2 31 (H) 08/20/2017    BUN 19 08/20/2017    CREATININE 0.8 08/20/2017    CALCIUM 9.2 08/20/2017    ANIONGAP 7 (L) 08/20/2017    ESTGFRAFRICA >60 08/20/2017    EGFRNONAA >60 08/20/2017         Diagnostic Results:  X-Ray: Reviewed  chronic lung disease        ASSESSMENT/PLAN:      I discussed with the patient the plan of care for admission for COPD, however the patient declined admission at this point stating that he would rather go AGAINST MEDICAL ADVICE at home with steroids and antibiotics and if he gets worse will go back to the hospital. I discussed this plan with Dr. Rose, emergency room physician who agrees with that and will make the appropriate will make the appropriate arrangements for the patient.

## 2017-08-28 ENCOUNTER — HOSPITAL ENCOUNTER (OUTPATIENT)
Facility: HOSPITAL | Age: 72
Discharge: HOME HEALTH | End: 2017-08-30
Attending: EMERGENCY MEDICINE | Admitting: INTERNAL MEDICINE
Payer: MEDICARE

## 2017-08-28 DIAGNOSIS — K52.9 ENTERITIS: Primary | ICD-10-CM

## 2017-08-28 DIAGNOSIS — I25.10 CORONARY ARTERY DISEASE, ANGINA PRESENCE UNSPECIFIED, UNSPECIFIED VESSEL OR LESION TYPE, UNSPECIFIED WHETHER NATIVE OR TRANSPLANTED HEART: ICD-10-CM

## 2017-08-28 DIAGNOSIS — R79.1 ELEVATED INR: ICD-10-CM

## 2017-08-28 DIAGNOSIS — B20 HIV (HUMAN IMMUNODEFICIENCY VIRUS INFECTION): ICD-10-CM

## 2017-08-28 DIAGNOSIS — E43 SEVERE MALNUTRITION: ICD-10-CM

## 2017-08-28 DIAGNOSIS — Z72.0 TOBACCO ABUSE: ICD-10-CM

## 2017-08-28 LAB
ALBUMIN SERPL BCP-MCNC: 2.6 G/DL
ALP SERPL-CCNC: 115 U/L
ALT SERPL W/O P-5'-P-CCNC: 11 U/L
ANION GAP SERPL CALC-SCNC: 6 MMOL/L
APTT BLDCRRT: 75.9 SEC
AST SERPL-CCNC: 17 U/L
BASOPHILS # BLD AUTO: 0 K/UL
BASOPHILS NFR BLD: 0.3 %
BILIRUB SERPL-MCNC: 0.3 MG/DL
BUN SERPL-MCNC: 26 MG/DL
CALCIUM SERPL-MCNC: 8 MG/DL
CHLORIDE SERPL-SCNC: 111 MMOL/L
CO2 SERPL-SCNC: 23 MMOL/L
CREAT SERPL-MCNC: 1.1 MG/DL
DIFFERENTIAL METHOD: ABNORMAL
EOSINOPHIL # BLD AUTO: 0.1 K/UL
EOSINOPHIL NFR BLD: 0.9 %
ERYTHROCYTE [DISTWIDTH] IN BLOOD BY AUTOMATED COUNT: 15.7 %
EST. GFR  (AFRICAN AMERICAN): >60 ML/MIN/1.73 M^2
EST. GFR  (NON AFRICAN AMERICAN): >60 ML/MIN/1.73 M^2
GLUCOSE SERPL-MCNC: 96 MG/DL
HCT VFR BLD AUTO: 34.3 %
HGB BLD-MCNC: 10.9 G/DL
INR PPP: 4.4
LIPASE SERPL-CCNC: 43 U/L
LYMPHOCYTES # BLD AUTO: 1.9 K/UL
LYMPHOCYTES NFR BLD: 18.3 %
MCH RBC QN AUTO: 31.6 PG
MCHC RBC AUTO-ENTMCNC: 31.9 G/DL
MCV RBC AUTO: 99 FL
MONOCYTES # BLD AUTO: 0.7 K/UL
MONOCYTES NFR BLD: 7 %
NEUTROPHILS # BLD AUTO: 7.7 K/UL
NEUTROPHILS NFR BLD: 73.5 %
PLATELET # BLD AUTO: 186 K/UL
PMV BLD AUTO: 8.9 FL
POTASSIUM SERPL-SCNC: 4 MMOL/L
PROT SERPL-MCNC: 6 G/DL
PROTHROMBIN TIME: 44 SEC
RBC # BLD AUTO: 3.47 M/UL
SODIUM SERPL-SCNC: 140 MMOL/L
WBC # BLD AUTO: 10.5 K/UL

## 2017-08-28 PROCEDURE — 86900 BLOOD TYPING SEROLOGIC ABO: CPT

## 2017-08-28 PROCEDURE — 85610 PROTHROMBIN TIME: CPT

## 2017-08-28 PROCEDURE — 85730 THROMBOPLASTIN TIME PARTIAL: CPT

## 2017-08-28 PROCEDURE — 96360 HYDRATION IV INFUSION INIT: CPT

## 2017-08-28 PROCEDURE — 83690 ASSAY OF LIPASE: CPT

## 2017-08-28 PROCEDURE — 80053 COMPREHEN METABOLIC PANEL: CPT

## 2017-08-28 PROCEDURE — 25000003 PHARM REV CODE 250: Performed by: EMERGENCY MEDICINE

## 2017-08-28 PROCEDURE — 96361 HYDRATE IV INFUSION ADD-ON: CPT

## 2017-08-28 PROCEDURE — 36415 COLL VENOUS BLD VENIPUNCTURE: CPT

## 2017-08-28 PROCEDURE — 81003 URINALYSIS AUTO W/O SCOPE: CPT

## 2017-08-28 PROCEDURE — 99285 EMERGENCY DEPT VISIT HI MDM: CPT | Mod: 25

## 2017-08-28 PROCEDURE — 86850 RBC ANTIBODY SCREEN: CPT

## 2017-08-28 PROCEDURE — 85025 COMPLETE CBC W/AUTO DIFF WBC: CPT

## 2017-08-28 PROCEDURE — 25500020 PHARM REV CODE 255

## 2017-08-28 RX ORDER — SODIUM CHLORIDE 9 MG/ML
1000 INJECTION, SOLUTION INTRAVENOUS
Status: COMPLETED | OUTPATIENT
Start: 2017-08-28 | End: 2017-08-28

## 2017-08-28 RX ADMIN — SODIUM CHLORIDE 1000 ML: 0.9 INJECTION, SOLUTION INTRAVENOUS at 10:08

## 2017-08-28 RX ADMIN — IOHEXOL 75 ML: 350 INJECTION, SOLUTION INTRAVENOUS at 10:08

## 2017-08-29 PROBLEM — K52.9 ENTERITIS: Status: ACTIVE | Noted: 2017-08-29

## 2017-08-29 PROBLEM — E43 SEVERE MALNUTRITION: Status: ACTIVE | Noted: 2017-08-29

## 2017-08-29 PROBLEM — R79.1 ELEVATED INR: Status: ACTIVE | Noted: 2017-08-29

## 2017-08-29 LAB
ABO + RH BLD: NORMAL
ANION GAP SERPL CALC-SCNC: 6 MMOL/L
BASOPHILS # BLD AUTO: 0 K/UL
BASOPHILS NFR BLD: 0.3 %
BILIRUB UR QL STRIP: NEGATIVE
BLD GP AB SCN CELLS X3 SERPL QL: NORMAL
BUN SERPL-MCNC: 22 MG/DL
C DIFF GDH STL QL: POSITIVE
C DIFF TOX A+B STL QL IA: NEGATIVE
CALCIUM SERPL-MCNC: 7.5 MG/DL
CHLORIDE SERPL-SCNC: 110 MMOL/L
CLARITY UR: CLEAR
CO2 SERPL-SCNC: 22 MMOL/L
COLOR UR: YELLOW
CREAT SERPL-MCNC: 0.8 MG/DL
DIFFERENTIAL METHOD: ABNORMAL
EOSINOPHIL # BLD AUTO: 0.1 K/UL
EOSINOPHIL NFR BLD: 1.1 %
ERYTHROCYTE [DISTWIDTH] IN BLOOD BY AUTOMATED COUNT: 15.8 %
EST. GFR  (AFRICAN AMERICAN): >60 ML/MIN/1.73 M^2
EST. GFR  (NON AFRICAN AMERICAN): >60 ML/MIN/1.73 M^2
GLUCOSE SERPL-MCNC: 113 MG/DL
GLUCOSE UR QL STRIP: NEGATIVE
HCT VFR BLD AUTO: 30.4 %
HGB BLD-MCNC: 9.9 G/DL
HGB UR QL STRIP: NEGATIVE
INR PPP: 4.1
KETONES UR QL STRIP: NEGATIVE
LEUKOCYTE ESTERASE UR QL STRIP: NEGATIVE
LYMPHOCYTES # BLD AUTO: 2.4 K/UL
LYMPHOCYTES NFR BLD: 21.5 %
MCH RBC QN AUTO: 32 PG
MCHC RBC AUTO-ENTMCNC: 32.5 G/DL
MCV RBC AUTO: 99 FL
MONOCYTES # BLD AUTO: 0.9 K/UL
MONOCYTES NFR BLD: 8.2 %
NEUTROPHILS # BLD AUTO: 7.7 K/UL
NEUTROPHILS NFR BLD: 68.9 %
NITRITE UR QL STRIP: NEGATIVE
PH UR STRIP: 6 [PH] (ref 5–8)
PLATELET # BLD AUTO: 168 K/UL
PMV BLD AUTO: 8.5 FL
POTASSIUM SERPL-SCNC: 3.2 MMOL/L
PROT UR QL STRIP: NEGATIVE
PROTHROMBIN TIME: 40.8 SEC
RBC # BLD AUTO: 3.08 M/UL
SODIUM SERPL-SCNC: 138 MMOL/L
SP GR UR STRIP: <=1.005 (ref 1–1.03)
URN SPEC COLLECT METH UR: ABNORMAL
UROBILINOGEN UR STRIP-ACNC: NEGATIVE EU/DL
WBC # BLD AUTO: 11.2 K/UL
WBC #/AREA STL HPF: NORMAL /[HPF]

## 2017-08-29 PROCEDURE — 25000242 PHARM REV CODE 250 ALT 637 W/ HCPCS: Performed by: INTERNAL MEDICINE

## 2017-08-29 PROCEDURE — 96374 THER/PROPH/DIAG INJ IV PUSH: CPT

## 2017-08-29 PROCEDURE — 25000003 PHARM REV CODE 250: Performed by: EMERGENCY MEDICINE

## 2017-08-29 PROCEDURE — 80048 BASIC METABOLIC PNL TOTAL CA: CPT

## 2017-08-29 PROCEDURE — 36415 COLL VENOUS BLD VENIPUNCTURE: CPT

## 2017-08-29 PROCEDURE — 89055 LEUKOCYTE ASSESSMENT FECAL: CPT

## 2017-08-29 PROCEDURE — 27000221 HC OXYGEN, UP TO 24 HOURS

## 2017-08-29 PROCEDURE — S0030 INJECTION, METRONIDAZOLE: HCPCS | Performed by: INTERNAL MEDICINE

## 2017-08-29 PROCEDURE — 87493 C DIFF AMPLIFIED PROBE: CPT

## 2017-08-29 PROCEDURE — 99220 PR INITIAL OBSERVATION CARE,LEVL III: CPT | Mod: ,,, | Performed by: INTERNAL MEDICINE

## 2017-08-29 PROCEDURE — 87045 FECES CULTURE AEROBIC BACT: CPT

## 2017-08-29 PROCEDURE — 87449 NOS EACH ORGANISM AG IA: CPT

## 2017-08-29 PROCEDURE — 94640 AIRWAY INHALATION TREATMENT: CPT | Mod: 76

## 2017-08-29 PROCEDURE — S0028 INJECTION, FAMOTIDINE, 20 MG: HCPCS | Performed by: EMERGENCY MEDICINE

## 2017-08-29 PROCEDURE — 85025 COMPLETE CBC W/AUTO DIFF WBC: CPT

## 2017-08-29 PROCEDURE — 85610 PROTHROMBIN TIME: CPT

## 2017-08-29 PROCEDURE — 25000003 PHARM REV CODE 250: Performed by: INTERNAL MEDICINE

## 2017-08-29 PROCEDURE — 87046 STOOL CULTR AEROBIC BACT EA: CPT | Mod: 59

## 2017-08-29 PROCEDURE — 87427 SHIGA-LIKE TOXIN AG IA: CPT | Mod: 59

## 2017-08-29 PROCEDURE — G0378 HOSPITAL OBSERVATION PER HR: HCPCS

## 2017-08-29 PROCEDURE — 94640 AIRWAY INHALATION TREATMENT: CPT

## 2017-08-29 PROCEDURE — 94761 N-INVAS EAR/PLS OXIMETRY MLT: CPT

## 2017-08-29 PROCEDURE — 99900035 HC TECH TIME PER 15 MIN (STAT)

## 2017-08-29 RX ORDER — EFAVIRENZ 600 MG/1
600 TABLET, FILM COATED ORAL NIGHTLY
Status: DISCONTINUED | OUTPATIENT
Start: 2017-08-29 | End: 2017-08-30 | Stop reason: HOSPADM

## 2017-08-29 RX ORDER — TRAMADOL HYDROCHLORIDE 50 MG/1
50 TABLET ORAL EVERY 6 HOURS PRN
Status: DISCONTINUED | OUTPATIENT
Start: 2017-08-29 | End: 2017-08-30 | Stop reason: HOSPADM

## 2017-08-29 RX ORDER — FLUOXETINE HYDROCHLORIDE 20 MG/1
40 CAPSULE ORAL NIGHTLY
Status: DISCONTINUED | OUTPATIENT
Start: 2017-08-29 | End: 2017-08-30 | Stop reason: HOSPADM

## 2017-08-29 RX ORDER — MORPHINE SULFATE ORAL SOLUTION 10 MG/5ML
15 SOLUTION ORAL 2 TIMES DAILY
Status: DISCONTINUED | OUTPATIENT
Start: 2017-08-29 | End: 2017-08-30 | Stop reason: HOSPADM

## 2017-08-29 RX ORDER — IPRATROPIUM BROMIDE 0.5 MG/2.5ML
0.5 SOLUTION RESPIRATORY (INHALATION) EVERY 6 HOURS
Status: DISCONTINUED | OUTPATIENT
Start: 2017-08-29 | End: 2017-08-30 | Stop reason: HOSPADM

## 2017-08-29 RX ORDER — TENOFOVIR DISOPROXIL FUMARATE 300 MG/1
300 TABLET, FILM COATED ORAL NIGHTLY
Status: DISCONTINUED | OUTPATIENT
Start: 2017-08-29 | End: 2017-08-30 | Stop reason: HOSPADM

## 2017-08-29 RX ORDER — TOPIRAMATE 25 MG/1
50 TABLET ORAL 2 TIMES DAILY
Status: DISCONTINUED | OUTPATIENT
Start: 2017-08-29 | End: 2017-08-30 | Stop reason: HOSPADM

## 2017-08-29 RX ORDER — FAMOTIDINE 10 MG/ML
20 INJECTION INTRAVENOUS EVERY 12 HOURS
Status: DISCONTINUED | OUTPATIENT
Start: 2017-08-29 | End: 2017-08-30 | Stop reason: HOSPADM

## 2017-08-29 RX ORDER — ERGOCALCIFEROL 1.25 MG/1
50000 CAPSULE ORAL
Status: DISCONTINUED | OUTPATIENT
Start: 2017-08-29 | End: 2017-08-30 | Stop reason: HOSPADM

## 2017-08-29 RX ORDER — METOPROLOL TARTRATE 25 MG/1
12.5 TABLET ORAL 2 TIMES DAILY
Status: DISCONTINUED | OUTPATIENT
Start: 2017-08-29 | End: 2017-08-30 | Stop reason: HOSPADM

## 2017-08-29 RX ORDER — METRONIDAZOLE 500 MG/100ML
500 INJECTION, SOLUTION INTRAVENOUS
Status: DISCONTINUED | OUTPATIENT
Start: 2017-08-29 | End: 2017-08-30 | Stop reason: HOSPADM

## 2017-08-29 RX ORDER — IPRATROPIUM BROMIDE AND ALBUTEROL SULFATE 2.5; .5 MG/3ML; MG/3ML
3 SOLUTION RESPIRATORY (INHALATION) EVERY 6 HOURS PRN
Status: DISCONTINUED | OUTPATIENT
Start: 2017-08-29 | End: 2017-08-29 | Stop reason: SDUPTHER

## 2017-08-29 RX ORDER — L. ACIDOPHILUS/L.BULGARICUS 100MM CELL
1 GRANULES IN PACKET (EA) ORAL 2 TIMES DAILY
Status: DISCONTINUED | OUTPATIENT
Start: 2017-08-29 | End: 2017-08-30 | Stop reason: HOSPADM

## 2017-08-29 RX ORDER — BUDESONIDE AND FORMOTEROL FUMARATE DIHYDRATE 80; 4.5 UG/1; UG/1
2 AEROSOL RESPIRATORY (INHALATION) 2 TIMES DAILY
Status: DISCONTINUED | OUTPATIENT
Start: 2017-08-29 | End: 2017-08-29

## 2017-08-29 RX ORDER — GABAPENTIN 400 MG/1
800 CAPSULE ORAL 3 TIMES DAILY
Status: DISCONTINUED | OUTPATIENT
Start: 2017-08-29 | End: 2017-08-30 | Stop reason: HOSPADM

## 2017-08-29 RX ORDER — EFAVIRENZ, EMTRICITABINE AND TENOFOVIR DISOPROXIL FUMARATE 600; 200; 300 MG/1; MG/1; MG/1
1 TABLET, FILM COATED ORAL NIGHTLY
Status: DISCONTINUED | OUTPATIENT
Start: 2017-08-29 | End: 2017-08-29

## 2017-08-29 RX ORDER — PANTOPRAZOLE SODIUM 40 MG/1
40 TABLET, DELAYED RELEASE ORAL DAILY
Status: DISCONTINUED | OUTPATIENT
Start: 2017-08-29 | End: 2017-08-30 | Stop reason: HOSPADM

## 2017-08-29 RX ORDER — CYPROHEPTADINE HYDROCHLORIDE 4 MG/1
4 TABLET ORAL 3 TIMES DAILY PRN
Status: DISCONTINUED | OUTPATIENT
Start: 2017-08-29 | End: 2017-08-30 | Stop reason: HOSPADM

## 2017-08-29 RX ORDER — EMTRICITABINE 200 MG/1
200 CAPSULE ORAL NIGHTLY
Status: DISCONTINUED | OUTPATIENT
Start: 2017-08-29 | End: 2017-08-30 | Stop reason: HOSPADM

## 2017-08-29 RX ORDER — SUCRALFATE 1 G/1
1 TABLET ORAL 4 TIMES DAILY
Status: DISCONTINUED | OUTPATIENT
Start: 2017-08-29 | End: 2017-08-30 | Stop reason: HOSPADM

## 2017-08-29 RX ORDER — SODIUM CHLORIDE 9 MG/ML
INJECTION, SOLUTION INTRAVENOUS CONTINUOUS
Status: DISCONTINUED | OUTPATIENT
Start: 2017-08-29 | End: 2017-08-30 | Stop reason: HOSPADM

## 2017-08-29 RX ORDER — IPRATROPIUM BROMIDE AND ALBUTEROL SULFATE 2.5; .5 MG/3ML; MG/3ML
3 SOLUTION RESPIRATORY (INHALATION) EVERY 6 HOURS PRN
Status: DISCONTINUED | OUTPATIENT
Start: 2017-08-29 | End: 2017-08-30 | Stop reason: HOSPADM

## 2017-08-29 RX ORDER — FLUTICASONE FUROATE AND VILANTEROL 100; 25 UG/1; UG/1
1 POWDER RESPIRATORY (INHALATION) DAILY
Status: DISCONTINUED | OUTPATIENT
Start: 2017-08-29 | End: 2017-08-30 | Stop reason: HOSPADM

## 2017-08-29 RX ORDER — TIOTROPIUM BROMIDE 18 UG/1
18 CAPSULE ORAL; RESPIRATORY (INHALATION) DAILY
Status: DISCONTINUED | OUTPATIENT
Start: 2017-08-29 | End: 2017-08-29

## 2017-08-29 RX ORDER — VALACYCLOVIR HYDROCHLORIDE 500 MG/1
500 TABLET, FILM COATED ORAL 2 TIMES DAILY
Status: DISCONTINUED | OUTPATIENT
Start: 2017-08-29 | End: 2017-08-30 | Stop reason: HOSPADM

## 2017-08-29 RX ADMIN — FAMOTIDINE 20 MG: 10 INJECTION, SOLUTION INTRAVENOUS at 08:08

## 2017-08-29 RX ADMIN — IPRATROPIUM BROMIDE 0.5 MG: 0.5 SOLUTION RESPIRATORY (INHALATION) at 11:08

## 2017-08-29 RX ADMIN — EFAVIRENZ 600 MG: 600 TABLET, FILM COATED ORAL at 10:08

## 2017-08-29 RX ADMIN — GABAPENTIN 800 MG: 400 CAPSULE ORAL at 02:08

## 2017-08-29 RX ADMIN — SUCRALFATE 1 G: 1 TABLET ORAL at 05:08

## 2017-08-29 RX ADMIN — VALACYCLOVIR HYDROCHLORIDE 500 MG: 500 TABLET, FILM COATED ORAL at 08:08

## 2017-08-29 RX ADMIN — LACTOBACILLUS ACIDOPHILUS / LACTOBACILLUS BULGARICUS 1 EACH: 100 MILLION CFU STRENGTH GRANULES at 08:08

## 2017-08-29 RX ADMIN — IPRATROPIUM BROMIDE 0.5 MG: 0.5 SOLUTION RESPIRATORY (INHALATION) at 01:08

## 2017-08-29 RX ADMIN — SODIUM CHLORIDE: 0.9 INJECTION, SOLUTION INTRAVENOUS at 02:08

## 2017-08-29 RX ADMIN — METRONIDAZOLE 500 MG: 500 INJECTION, SOLUTION INTRAVENOUS at 11:08

## 2017-08-29 RX ADMIN — FLUTICASONE FUROATE AND VILANTEROL TRIFENATATE 1 PUFF: 100; 25 POWDER RESPIRATORY (INHALATION) at 01:08

## 2017-08-29 RX ADMIN — METRONIDAZOLE 500 MG: 500 INJECTION, SOLUTION INTRAVENOUS at 02:08

## 2017-08-29 RX ADMIN — PANTOPRAZOLE SODIUM 40 MG: 40 TABLET, DELAYED RELEASE ORAL at 11:08

## 2017-08-29 RX ADMIN — TENOFOVIR DISOPROXIL FUMARATE 300 MG: 300 TABLET, COATED ORAL at 10:08

## 2017-08-29 RX ADMIN — ERGOCALCIFEROL 50000 UNITS: 1.25 CAPSULE, LIQUID FILLED ORAL at 10:08

## 2017-08-29 RX ADMIN — TOPIRAMATE 50 MG: 25 TABLET, FILM COATED ORAL at 08:08

## 2017-08-29 RX ADMIN — SUCRALFATE 1 G: 1 TABLET ORAL at 11:08

## 2017-08-29 RX ADMIN — Medication 12.5 MG: at 11:08

## 2017-08-29 RX ADMIN — TOPIRAMATE 50 MG: 25 TABLET, FILM COATED ORAL at 11:08

## 2017-08-29 RX ADMIN — FLUOXETINE 40 MG: 20 CAPSULE ORAL at 08:08

## 2017-08-29 RX ADMIN — VALACYCLOVIR HYDROCHLORIDE 500 MG: 500 TABLET, FILM COATED ORAL at 11:08

## 2017-08-29 RX ADMIN — Medication 12.5 MG: at 08:08

## 2017-08-29 RX ADMIN — EMTRICITABINE 200 MG: 200 CAPSULE ORAL at 10:08

## 2017-08-29 RX ADMIN — GABAPENTIN 800 MG: 400 CAPSULE ORAL at 08:08

## 2017-08-29 NOTE — H&P
"PCP: Gilberto Rosas MD    History & Physical    Chief Complaint: Abdominal pain and blcak stool for 2-3 weeks.    History of Present Illness:  Patient is a 72 y.o. male admitted to Hospitalist Service from [facility] Emergency Room with complaint of abdominal pain and black stools for 2-3 weeks. Patient has PMH significant for HIV, CAD s/p stent placement, hypertension, COPD. Part of the history obtained from patient's nephew. The pt endorses mild, cramping pain with no exacerbating or mitigating factors. He endorses associated diarrhea x 2 days that is "black" with intermittent dark stool. Per pt's nephew, the pt's INR was recently elevated and reports that the pt has been "really pale" x several days and is "generally weak". Patient denied chest pain, shortness of breath, nausea, vomiting, headache, vision changes, focal neuro-deficits, cough or fever.    Past Medical History:   Diagnosis Date    Arthritis     Asbestos exposure     COPD (chronic obstructive pulmonary disease)     O2 AT NITE PRN    Coronary artery disease     STENT X 1    Depression     HIV (human immunodeficiency virus infection)     Hypertension     NO MED NOW    Migraines     Myocardial infarction     Presence of dental bridge     UPPER AND LOWER    RLS (restless legs syndrome)     Wears glasses      Past Surgical History:   Procedure Laterality Date    CORONARY STENT PLACEMENT      FACIAL FRACTURE SURGERY  metal in left brow and cheek    's    finger scraped Right     index finger scraped at [facility] for staph infection    rectal warts      warts removed       Family History   Problem Relation Age of Onset    COPD Mother     Cancer Mother       of lung ca w/mets to stomach    Asbestos Mother     Vision loss Sister     Blindness Sister      with bilatereal removal     Depression Sister     Cancer Brother      lung    COPD Brother     Stroke Maternal Aunt     Heart disease Maternal Grandmother " "     pacemaker    Stroke Maternal Grandmother      Social History   Substance Use Topics    Smoking status: Current Every Day Smoker     Packs/day: 0.50     Years: 50.00     Types: Cigarettes     Last attempt to quit: 1/16/2015    Smokeless tobacco: Never Used      Comment: Pt states "already have all the information"    Alcohol use No      Review of patient's allergies indicates:   Allergen Reactions    Unable to assess Other (See Comments)     REX (DRUG FOR HIV) TAKEN 1989 ALMOST PARALYZED LEGS    Aztreonam Nausea And Vomiting     PTA Medications   Medication Sig    acetaminophen (TYLENOL) 500 MG tablet Take 500 mg by mouth every 6 (six) hours as needed. 2 TABS    albuterol (ACCUNEB) 0.63 mg/3 mL Nebu Take 0.63 mg by nebulization 4 (four) times daily as needed.     budesonide-formoterol 80-4.5 mcg (SYMBICORT) 80-4.5 mcg/actuation HFAA Inhale 2 puffs into the lungs.    clopidogrel (PLAVIX) 75 mg tablet Take 75 mg by mouth once daily.    cyproheptadine (PERIACTIN) 4 mg tablet Take 4 mg by mouth 3 (three) times daily as needed.    doxycycline (VIBRAMYCIN) 100 MG Cap Take 1 capsule (100 mg total) by mouth 2 (two) times daily.    efavirenz-emtrictabine-tenofovir 600-200-300 mg (ATRIPLA) 600-200-300 mg Tab Take 1 tablet by mouth every evening.     ergocalciferol (ERGOCALCIFEROL) 50,000 unit Cap Take 50,000 Units by mouth every 7 days. Takes on Mondays    famotidine (PEPCID) 20 MG tablet Take 1 tablet (20 mg total) by mouth 2 (two) times daily.    fluoxetine (PROZAC) 40 MG capsule Take 40 mg by mouth nightly.     folic acid (FOLVITE) 1 MG tablet Take 1 tablet (1 mg total) by mouth once daily.    gabapentin (NEURONTIN) 800 MG tablet Take 900 mg by mouth 3 (three) times daily.     hydrocodone-acetaminophen (VICODIN ES) 7.5-300 mg Tab Take by mouth.    methylPREDNISolone (MEDROL DOSEPACK) 4 mg tablet follow package directions    metoprolol tartrate (LOPRESSOR) 25 MG tablet 12.5 mg 2 (two) times " daily.     morphine (MSIR) 15 MG tablet Take 15 mg by mouth every 2 (two) hours as needed for Pain.    nitroGLYCERIN (NITROSTAT) 0.3 MG SL tablet Place 0.3 mg under the tongue every 5 (five) minutes as needed for Chest pain.    ondansetron (ZOFRAN-ODT) 4 MG TbDL Take 8 mg by mouth once.    pantoprazole (PROTONIX) 40 MG tablet Take 1 tablet (40 mg total) by mouth once daily.    sucralfate (CARAFATE) 1 gram tablet Take 1 tablet (1 g total) by mouth 4 (four) times daily.    tiotropium (SPIRIVA) 18 mcg inhalation capsule Inhale 18 mcg into the lungs once daily.    topiramate (TOPAMAX) 50 MG tablet Take 50 mg by mouth 2 (two) times daily. 2 TABS HS    tramadol (ULTRAM) 50 mg tablet Take 50 mg by mouth every 6 (six) hours as needed for Pain.    valacyclovir (VALTREX) 500 MG tablet Take 500 mg by mouth 2 (two) times daily.    warfarin (COUMADIN) 2.5 MG tablet Take 2.5 mg by mouth Daily.     albuterol-ipratropium 2.5mg-0.5mg/3mL (DUO-NEB) 0.5 mg-3 mg(2.5 mg base)/3 mL nebulizer solution Take 3 mLs by nebulization every 6 (six) hours as needed for Wheezing.     Review of Systems:  Constitutional: no fever or chills. + Pale and weak  Eyes: no visual changes  Ears, nose, mouth, throat, and face: no nasal congestion or sore throat  Respiratory: no cough or shorness of breath  Cardiovascular: no chest pain or palpitations  Gastrointestinal: see HPI  Genitourinary: no hematuria or dysuria  Integument/breast: no rash or pruritis  Hematologic/lymphatic: no easy bruising or lymphadenopathy  Musculoskeletal: no arthralgias or myalgias  Neurological: no seizures or tremors.  Behavioral/Psych: no auditory or visual hallucinations  Endocrine: no heat or cold intolerance     OBJECTIVE:     Vital Signs (Most Recent)  Temp: 98.3 °F (36.8 °C) (08/29/17 0725)  Pulse: 71 (08/29/17 0725)  Resp: 18 (08/29/17 0725)  BP: 128/65 (08/29/17 0725)  SpO2: 98 % (08/29/17 0725)    Physical Exam:  General appearance: well developed, appears  stated age  Head: normocephalic, atraumatic  Eyes: pallor conjunctivae/corneas clear. PERRL.  Nose: Nares normal. Septum midline.  Throat: lips, mucosa, and tongue normal; teeth and gums normal, no throat erythema.  Neck: supple, symmetrical, trachea midline, no JVD and thyroid not enlarged, symmetric, no tenderness/mass/nodules  Lungs:  clear to auscultation bilaterally and normal respiratory effort  Chest wall: no tenderness  Heart: regular rate and rhythm, S1, S2 normal, no murmur, click, rub or gallop  Abdomen: soft, non-tender non-distented; bowel sounds normal; no masses,  no organomegaly  ER rectal examination: guaiac positive stool. Guaiac positive stool.   Extremities: no cyanosis, clubbing or edema.   Pulses: 2+ and symmetric  Skin: Skin color, texture, turgor normal. No rashes or lesions.  Lymph nodes: Cervical, supraclavicular, and axillary nodes normal.  Neurologic: Normal strength and tone. No focal numbness or weakness. CNII-XII intact.      Laboratory:   CBC:   Recent Labs  Lab 08/29/17  0415   WBC 11.20   RBC 3.08*   HGB 9.9*   HCT 30.4*      MCV 99*   MCH 32.0*   MCHC 32.5     CMP:   Recent Labs  Lab 08/28/17 2220 08/29/17  0415   GLU 96 113*   CALCIUM 8.0* 7.5*   ALBUMIN 2.6*  --    PROT 6.0  --     138   K 4.0 3.2*   CO2 23 22*   * 110   BUN 26* 22   CREATININE 1.1 0.8   ALKPHOS 115  --    ALT 11  --    AST 17  --    BILITOT 0.3  --      Coagulation:   Recent Labs  Lab 08/28/17 2220 08/29/17  0830   LABPROT 44.0* 40.8*   INR 4.4* 4.1*   APTT 75.9*  --        Recent Labs  Lab 08/28/17  2328   COLORU Yellow   SPECGRAV <=1.005*   PHUR 6.0   PROTEINUA Negative   NITRITE Negative   LEUKOCYTESUR Negative   UROBILINOGEN Negative       Hemoglobin A1C   Date Value Ref Range Status   06/08/2017 5.2 4.5 - 6.2 % Final     Comment:     According to ADA guidelines, hemoglobin A1C <7.0% represents  optimal control in non-pregnant diabetic patients.  Different  metrics may apply to specific  populations.   Standards of Medical Care in Diabetes - 2016.  For the purpose of screening for the presence of diabetes:  <5.7%     Consistent with the absence of diabetes  5.7-6.4%  Consistent with increasing risk for diabetes   (prediabetes)  >or=6.5%  Consistent with diabetes  Currently no consensus exists for use of hemoglobin A1C  for diagnosis of diabetes for children.       Microbiology Results (last 7 days)     ** No results found for the last 168 hours. **        Diagnostic Results:  CT abdomen:  1.  Mildly dilated, fluid-filled mid and lower abdominal small bowel loops without focal zone of transition identified. Differential considerations include mild enteritis and ileus. Consider radiographic followup if symptoms persist.  2. Masslike consolidation in the left lower lobe compatible with previous chest CT and PET CT findings from 7/12/14. Severe pulmonary emphysema is also noted. Differential considerations include neoplasm and organized pneumonia. Correlation for prior workup of the same is recommended.  3. Chronic left ventricular thrombus which is smaller in volume relative to the 2014 study.  4. Mild, nonobstructive left nephrolithiasis. No ureteral stone or obstructive uropathy noted.  5. Sigmoid colonic diverticulosis.    Assessment/Plan:   Acute gastroenteritis  Check stool studies - C. Diff, O+P, Cx and WBC.  Start PO Flagyl 500 mg TID.  Start Lactinex.    Acute on chronic anemia of GI blood loss  Sub-acute GI bleeding  Advance as tolerated. Not interested in GI work up.   Follow H/H closely. Type and screen blood and transfuse as needed.  Continue PO Protonix.  Continue IVF hydration.   Use IV anti-emetics as needed.      Moderate malnutrition [E44.0]  Severe. Nutrition consulted. Encourage maximal PO intake. Diet supplementation ordered per nutrition approval. Will encourage PO and monitor closely for weight changes.      Yes    Chronic obstructive pulmonary disease with acute exacerbation  [J44.1]  Supplemental O2 via nasal canula; titrate O2 saturation to >92%.   Continue beta 2 agonist bronchodilator treatments.   Continue IV antibiotics.  Check sputum GS and Cx.   Continue routine medications as before.   \  Elevated INR  Hold Coumadin.     Yes    CAD (coronary artery disease) [I25.10]  Patient with known CAD and monitor for S/Sx of angina/ACS. Continue to monitor on telemetry.      Yes    HIV (human immunodeficiency virus infection) [Z21]  Continue HAART therapy.         VTE Risk Mitigation         Ordered     Medium Risk of VTE  Once      08/29/17 0215     Place sequential compression device  Until discontinued      08/29/17 0215     Place KAMINI hose  Until discontinued      08/29/17 0215        Analilia Nolasco MD  Department of Hospital Medicine   [facility]

## 2017-08-29 NOTE — PLAN OF CARE
Problem: Patient Care Overview  Goal: Plan of Care Review  Outcome: Ongoing (interventions implemented as appropriate)  Recommendations  Recommendation/Intervention: 1.) Continue with Clear liquids advancing as patient tolerates to GI Soft/Light diet 2.) Consider Clinimix 2.75/5 @ 75 mls/hr continuous + 20% lipids daily (250mls) providing 1004 kcals/day, 50 g protein/day, and 1800 mls fluid/day.   Goals: 1.) diet will advance within 72 hrs. 2.) patient will meet >80% of EEN  Nutrition Goal Status: new  Communication of RD Recs: other (comment) (sticky note)

## 2017-08-29 NOTE — ASSESSMENT & PLAN NOTE
Related to (etiology):   Decreased ability to consume sufficient energy    Signs and Symptoms (as evidenced by):   1.) EEN </=50% >1 month 2.) Severe muscle loss: scooping of temples, protruding of clavicle, squared shoulders 3.) Severe Fat loss: mostly skin of upper arm region, pronounced hollowness of orbitals. 4.) 10% weight loss in 6 months     Interventions/Recommendations (treatment strategy):  Continue with diet advancement. Consider PPN for additional kcals.     Nutrition Diagnosis Status:   New

## 2017-08-29 NOTE — ED NOTES
Patient identifiers for Boni Lerner checked and correct.  LOC: Patient is awake, alert, and aware of environment with an appropriate affect. Patient is oriented x 3 and speaking appropriately.  APPEARANCE: Patient resting comfortably and in no acute distress. Patient is clean and well groomed, patient's clothing is properly fastened.  SKIN: The skin is warm and dry. Patient has normal skin turgor and moist mucus membrances. Skin is intact; no bruising or breakdown noted. Skin color is pale  MUSCULOSKELETAL: Patient is moving all extremities well, no obvious deformities noted. Pulses intact. Reports general all over weakness  RESPIRATORY: Airway is open and patent. Respirations are spontaneous and non-labored with normal effort and rate. Patient wears home oxygen at all times, setting of 3.5 liters per Nasal Cannula. Reports increase in shortness of breath, worsening slowly the past 2 weeks.   CARDIAC: Patient has a normal rate and rhythm. No peripheral edema noted. Capillary refill < 3 seconds.  ABDOMEN: No distention noted. Bowel sounds active in all 4 quadrants. Soft and non-tender upon palpation. Reports dark tarry stools x 2 days  NEUROLOGICAL: PERRL. Facial expression is symmetrical. Hand grasps are equal bilaterally. Normal sensation in all extremities when touched with finger.   Allergies reported:   Review of patient's allergies indicates:   Allergen Reactions    Unable to assess Other (See Comments)     REX (DRUG FOR HIV) TAKEN 1989 ALMOST PARALYZED LEGS    Aztreonam Nausea And Vomiting

## 2017-08-29 NOTE — ED PROVIDER NOTES
"Encounter Date: 8/28/2017    SCRIBE #1 NOTE: I, Cinthya Herzog , am scribing for, and in the presence of,  Dr. Deleon . I have scribed the entire note.       History     Chief Complaint   Patient presents with    Abdominal Pain     Diarrhea for 2 weeks now    Rectal Bleeding     Pt has noticed black stool for about 3 weeks now.       08/28/2017  10:13 PM     Chief Complaint: Abdominal pain        The patient is a 72 y.o. male who is presenting with the gradual onset of diffuse abdominal paint that began x 2 days ago. The pt endorses mild, cramping pain with no exacerbating or mitigating factors. He endorses associated diarrhea x 2 days that is "black" with intermittent dark stool. Per pt's nephew, the pt's INR was recently elevated and reports that the pt has been "really pale" x several days and is "generally weak". Pertinent PMHx includes COPD (chronic obstructive pulmonary disease); Coronary artery disease; Depression; HIV (human immunodeficiency virus infection); Hypertension; Migraines; Myocardial infarction. Pertinent past surgical hx includes coronary stent placement.               The history is provided by the patient and medical records.     Review of patient's allergies indicates:   Allergen Reactions    Unable to assess Other (See Comments)     NORIAN (DRUG FOR HIV) TAKEN 1989 ALMOST PARALYZED LEGS    Aztreonam Nausea And Vomiting     Past Medical History:   Diagnosis Date    Arthritis     Asbestos exposure     COPD (chronic obstructive pulmonary disease)     O2 AT NITE PRN    Coronary artery disease     STENT X 1    Depression     HIV (human immunodeficiency virus infection)     Hypertension     NO MED NOW    Migraines     Myocardial infarction     Presence of dental bridge     UPPER AND LOWER    RLS (restless legs syndrome)     Wears glasses      Past Surgical History:   Procedure Laterality Date    CORONARY STENT PLACEMENT      FACIAL FRACTURE SURGERY  metal in left brow and cheek    " "1970's    finger scraped Right     index finger scraped at [facility] for staph infection    rectal warts  2013    warts removed       Family History   Problem Relation Age of Onset    COPD Mother     Cancer Mother       of lung ca w/mets to stomach    Asbestos Mother     Vision loss Sister     Blindness Sister      with bilatereal removal     Depression Sister     Cancer Brother      lung    COPD Brother     Stroke Maternal Aunt     Heart disease Maternal Grandmother      pacemaker    Stroke Maternal Grandmother      Social History   Substance Use Topics    Smoking status: Current Every Day Smoker     Packs/day: 0.50     Years: 50.00     Types: Cigarettes     Last attempt to quit: 2015    Smokeless tobacco: Never Used      Comment: Pt states "already have all the information"    Alcohol use No     Review of Systems   Constitutional: Negative for fever.   HENT: Negative for sore throat.    Eyes: Negative for visual disturbance.   Respiratory: Negative for shortness of breath.    Cardiovascular: Negative for chest pain.   Gastrointestinal: Positive for abdominal pain, blood in stool and diarrhea. Negative for nausea.   Genitourinary: Negative for dysuria.   Musculoskeletal: Negative for back pain.   Skin: Positive for pallor. Negative for rash.   Neurological: Positive for weakness.   Hematological: Does not bruise/bleed easily.   Psychiatric/Behavioral: Negative for confusion.       Physical Exam     Initial Vitals [17 2138]   BP Pulse Resp Temp SpO2   (!) 150/71 93 16 98.4 °F (36.9 °C) 98 %      MAP       97.33         Physical Exam    Nursing note and vitals reviewed.  Constitutional: He appears well-developed and well-nourished.   HENT:   Head: Normocephalic and atraumatic.   Mouth/Throat: Oropharynx is clear and moist.   Eyes: EOM are normal. Pupils are equal, round, and reactive to light.   Neck: Normal range of motion. Neck supple.   Cardiovascular: Normal rate, regular rhythm, " normal heart sounds and intact distal pulses. Exam reveals no gallop and no friction rub.    No murmur heard.  Pulmonary/Chest: Breath sounds normal. He has no wheezes. He has no rhonchi. He has no rales. He exhibits no tenderness.   Abdominal: Soft. He exhibits no distension. There is tenderness. There is no rebound and no guarding.   Epigastric and LLQ abdominal tenderness    Genitourinary: Rectal exam shows guaiac positive stool. Guaiac positive stool. : Acceptable.  Musculoskeletal: Normal range of motion. He exhibits no edema or tenderness.   Lymphadenopathy:     He has no cervical adenopathy.   Neurological: He is alert and oriented to person, place, and time. He has normal strength. He displays normal reflexes. No cranial nerve deficit or sensory deficit.   Skin: Skin is warm and dry. Capillary refill takes less than 2 seconds. No erythema. There is pallor.         ED Course   Procedures  Labs Reviewed   PROTIME-INR - Abnormal; Notable for the following:        Result Value    Prothrombin Time 44.0 (*)     INR 4.4 (*)     All other components within normal limits   APTT - Abnormal; Notable for the following:     aPTT 75.9 (*)     All other components within normal limits   COMPREHENSIVE METABOLIC PANEL - Abnormal; Notable for the following:     Chloride 111 (*)     BUN, Bld 26 (*)     Calcium 8.0 (*)     Albumin 2.6 (*)     Anion Gap 6 (*)     All other components within normal limits   CBC W/ AUTO DIFFERENTIAL - Abnormal; Notable for the following:     RBC 3.47 (*)     Hemoglobin 10.9 (*)     Hematocrit 34.3 (*)     MCV 99 (*)     MCH 31.6 (*)     MCHC 31.9 (*)     RDW 15.7 (*)     MPV 8.9 (*)     Gran% 73.5 (*)     All other components within normal limits   LIPASE   URINALYSIS   TYPE & SCREEN             Medical Decision Making:   Initial Assessment:   72-year-old male presented with a chief complaint of abdominal pain and dark stools.  Differential Diagnosis:   Ddx includes  diverticulitis, AVM, coagulopathy, and colitis   Clinical Tests:   Lab Tests: Ordered and Reviewed  Radiological Study: Ordered and Reviewed  ED Management:  The patient was emergently evaluated in the emergency Department, his evaluation was significant for an elderly male with abdominal tenderness.  The patient's labs were significant for an elevated INR, and his hemoglobin and hematocrit are at baseline normal.  The patient does have Hemoccult positive stool noted.  The patient stool is not melanotic.  The patient's CT scan does show an enteritis versus ileus as well as a left lower lobe consolidation versus mass.  I will admit the patient to the hospitalist service for further care and serial H&H's.  I've discussed the case with the APC on-call for the hospitalist service.  She has accepted the patient for admission.            Scribe Attestation:   Scribe #1: I performed the above scribed service and the documentation accurately describes the services I performed. I attest to the accuracy of the note.    Attending Attestation:           Physician Attestation for Scribe:  Physician Attestation Statement for Scribe #1: I, Cinthya Herzog , reviewed documentation, as scribed by Dr. Deleon  in my presence, and it is both accurate and complete.                 ED Course     Clinical Impression:   There were no encounter diagnoses.                           Bryant Deleon MD  08/29/17 0328

## 2017-08-29 NOTE — PLAN OF CARE
"CM met with patient,verifed address and insurance information. Patient lives with ran Isbell (Pete) [phone] who also has POA, patient has living will, pharmacy is [facility], has oxygen with [facility] and is current with [facility] and wants to resume, disclosure signed and in blue folder.   Patient reports needing assistance with ADL's and his nephew assists him as needed.  Patient has and reports  using a cane, scooter and wc at home.      08/29/17 1050   Discharge Assessment   Assessment Type Discharge Planning Assessment   Confirmed/corrected address and phone number on facesheet? Yes   Assessment information obtained from? Patient   Prior to hospitilization cognitive status: Alert/Oriented   Prior to hospitalization functional status: Needs Assistance   Current cognitive status: Alert/Oriented   Current Functional Status: Needs Assistance   Lives With sibling(s)  (lives with his blind sister who he helps care for according to patient)   Able to Return to Prior Arrangements yes   Is patient able to care for self after discharge? Yes   Who are your caregiver(s) and their phone number(s)? ran Isbell (Pete) [phone]   Patient's perception of discharge disposition home health   Readmission Within The Last 30 Days no previous admission in last 30 days   Patient currently being followed by outpatient case management? No   Patient currently receives any other outside agency services? No   Equipment Currently Used at Home 3-in-1 commode;oxygen   Do you have any problems affording any of your prescribed medications? No   Is the patient taking medications as prescribed? yes   Does the patient have transportation home? Yes   Does the patient receive services at the [facility]? Yes  ([facility] monitors and Dr. Wu manages)   Discharge Plan A Home Health   Patient/Family In Agreement With Plan yes     "

## 2017-08-29 NOTE — PROGRESS NOTES
08/29/17 1313   Patient Assessment/Suction   Level of Consciousness (AVPU) alert   Respiratory Effort Unlabored   All Lung Fields Breath Sounds diminished   PRE-TX-O2-ETCO2   O2 Device (Oxygen Therapy) nasal cannula   $ Is the patient on Oxygen? Yes   Flow (L/min) 5  (decreased to 3 lpm, what pt wears at home  )   SpO2 100 %   Pulse Oximetry Type Intermittent   $ Pulse Oximetry - Multiple Charge Pulse Oximetry - Multiple   Pulse 67   Resp 20   Aerosol Therapy   $ Aerosol Therapy Charges Aerosol Treatment   Respiratory Treatment Status given   SVN/Inhaler Treatment Route mask   Position During Treatment HOB at 45 degrees   Patient Tolerance good   Inhaler   $ Inhaler Charges MDI (Metered Dose Inahler) Treatment   Respiratory Treatment Status given   SVN/Inhaler Treatment Route mouthpiece   Patient Tolerance good   Post-Treatment   Post-treatment Heart Rate (beats/min) 71   Post-treatment Resp Rate (breaths/min) 20   All Fields Breath Sounds unchanged

## 2017-08-29 NOTE — CONSULTS
"  [facility]  Adult Nutrition  Consult Note    SUMMARY     Recommendations  Recommendation/Intervention: 1.) Continue with Clear liquids advancing as patient tolerates to GI Soft/Light diet 2.) Consider Clinimix 2.75/5 @ 75 mls/hr continuous + 20% lipids daily (250mls) providing 1004 kcals/day, 50 g protein/day, and 1800 mls fluid/day.   Goals: 1.) diet will advance within 72 hrs. 2.) patient will meet >80% of EEN  Nutrition Goal Status: new  Communication of RD Recs: other (comment) (sticky note)    1. Enteritis      Past Medical History:   Diagnosis Date    Arthritis     Asbestos exposure     COPD (chronic obstructive pulmonary disease)     O2 AT NITE PRN    Coronary artery disease     STENT X 1    Depression     HIV (human immunodeficiency virus infection)     Hypertension     NO MED NOW    Migraines     Myocardial infarction     Presence of dental bridge     UPPER AND LOWER    RLS (restless legs syndrome)     Wears glasses        Reason for Assessment  Reason for Assessment: nurse/nurse practitioner consult  Interdisciplinary Rounds: attended  General Information Comments: admits with abdominal pain/diarrhea/rectal bleeding. Patient alert laying in bed. Severe muscle/fat loss noted. Tolerating clear liquids this am. Family at bedside reports a UBW for patient ~150lbs. Per chart review, last documented weight: February 2017 patient was 125 lbs. Patient reports he can not eat well at home due to early satiety. consumed ~50% of meals or less. States he drank boost in the past but has since "burnt out" on them. Reports some nausea but no vomiting.       Nutrition Prescription Ordered  Current Diet Order: Clear liquids      Evaluation of Received Nutrients/Fluid Intake  IV Fluid (mL): 3000  % Intake of Estimated Energy Needs: 0 - 25 %  % Meal Intake: 25%     Nutrition Risk Screen  (-)    Labs/Tests/Procedures/Meds  Diagnostic Test/Procedure Review: reviewed, pertinent  Pertinent " "Labs Reviewed: reviewed, pertinent  BMP  Lab Results   Component Value Date     2017    K 3.2 (L) 2017     2017    CO2 22 (L) 2017    BUN 22 2017    CREATININE 0.8 2017    CALCIUM 7.5 (L) 2017    ANIONGAP 6 (L) 2017    ESTGFRAFRICA >60 2017    EGFRNONAA >60 2017     Lab Results   Component Value Date    ALBUMIN 2.6 (L) 2017     Lab Results   Component Value Date    CALCIUM 7.5 (L) 2017    PHOS 2.8 2013     No results for input(s): POCTGLUCOSE in the last 24 hours.    Pertinent Medications Reviewed: reviewed  Scheduled Meds:   efavirenz-emtrictabine-tenofovir 600-200-300 mg  1 tablet Oral QHS    ergocalciferol  50,000 Units Oral Every Mon    famotidine (PF)  20 mg Intravenous Q12H    fluoxetine  40 mg Oral Nightly    fluticasone-vilanterol  1 puff Inhalation Daily    gabapentin  800 mg Oral TID    ipratropium  0.5 mg Nebulization Q6H    metoprolol tartrate  12.5 mg Oral BID    morphine  15 mg Oral BID    pantoprazole  40 mg Oral Daily    sucralfate  1 g Oral QID    topiramate  50 mg Oral BID    valacyclovir  500 mg Oral BID     Continuous Infusions:   sodium chloride 0.9% 125 mL/hr at 17 0221         Physical Findings  Overall Physical Appearance: loss of subcutaneous fat, loss of muscle mass  Oral/Mouth Cavity: tooth/teeth missing  Skin: intact (Karl score 16)    Anthropometrics  Temp: 98.3 °F (36.8 °C)  Height: 5' 10"  Weight Method: Stated  Weight: 50.8 kg (111 lb 15.9 oz)  Ideal Body Weight (IBW), Male: 166 lb  % Ideal Body Weight, Male (lb): 67.46 lb  BMI (Calculated): 16.1  BMI Grade: 16 - 16.9 protein-energy malnutrition grade II  Usual Body Weight (UBW), k.8 kg  % Usual Body Weight: 89.62  % Weight Change From Usual Weight: 10.38 %    Estimated/Assessed Needs  Weight Used For Calorie Calculations: 50.8 kg (111 lb 15.9 oz)   Energy Need Method: Kcal/kg  35 kcal/kg (kcal): 1778 and 40 kcal/kg " (kcal): 2032   RMR (Finley-St. Jeor Equation): 1264.25  Weight Used For Protein Calculations: 50.8 kg (111 lb 15.9 oz)  1.2 gm Protein (gm): 61.09 and 1.5 gm Protein (gm): 76.36  Fluid Need Method: RDA Method (or per MD)          Assessment and Plan    Severe malnutrition    Related to (etiology):   Decreased ability to consume sufficient energy    Signs and Symptoms (as evidenced by):   1.) EEN </=50% >1 month 2.) Severe muscle loss: scooping of temples, protruding of clavicle, squared shoulders 3.) Severe Fat loss: mostly skin of upper arm region, pronounced hollowness of orbitals. 4.) 10% weight loss in 6 months     Interventions/Recommendations (treatment strategy):  Continue with diet advancement. Consider PPN for additional kcals.     Nutrition Diagnosis Status:   New              Monitor and Evaluation  Food and Nutrient Intake: energy intake, food and beverage intake  Food and Nutrient Adminstration: diet order  Anthropometric Measurements: weight, weight change, body mass index  Biochemical Data, Medical Tests and Procedures: electrolyte and renal panel, glucose/endocrine profile, lipid profile  Nutrition-Focused Physical Findings: overall appearance    Nutrition Risk  Level of Risk:  (x2 weekly)    Nutrition Follow-Up  RD Follow-up?: Yes    Discharge Planning: unable to determine at this time

## 2017-08-30 VITALS
TEMPERATURE: 99 F | SYSTOLIC BLOOD PRESSURE: 111 MMHG | OXYGEN SATURATION: 96 % | HEIGHT: 70 IN | BODY MASS INDEX: 16.03 KG/M2 | WEIGHT: 112 LBS | HEART RATE: 66 BPM | DIASTOLIC BLOOD PRESSURE: 66 MMHG | RESPIRATION RATE: 20 BRPM

## 2017-08-30 LAB
ANION GAP SERPL CALC-SCNC: 5 MMOL/L
BASOPHILS # BLD AUTO: 0 K/UL
BASOPHILS NFR BLD: 0.4 %
BUN SERPL-MCNC: 22 MG/DL
C DIFF TOX GENS STL QL NAA+PROBE: NEGATIVE
CALCIUM SERPL-MCNC: 7.6 MG/DL
CHLORIDE SERPL-SCNC: 114 MMOL/L
CO2 SERPL-SCNC: 20 MMOL/L
CREAT SERPL-MCNC: 0.8 MG/DL
DIFFERENTIAL METHOD: ABNORMAL
EOSINOPHIL # BLD AUTO: 0.2 K/UL
EOSINOPHIL NFR BLD: 2.4 %
ERYTHROCYTE [DISTWIDTH] IN BLOOD BY AUTOMATED COUNT: 15.3 %
EST. GFR  (AFRICAN AMERICAN): >60 ML/MIN/1.73 M^2
EST. GFR  (NON AFRICAN AMERICAN): >60 ML/MIN/1.73 M^2
GLUCOSE SERPL-MCNC: 97 MG/DL
HCT VFR BLD AUTO: 28.6 %
HGB BLD-MCNC: 9.1 G/DL
INR PPP: 3.3
LYMPHOCYTES # BLD AUTO: 2.2 K/UL
LYMPHOCYTES NFR BLD: 22.9 %
MCH RBC QN AUTO: 31.5 PG
MCHC RBC AUTO-ENTMCNC: 31.7 G/DL
MCV RBC AUTO: 99 FL
MONOCYTES # BLD AUTO: 0.8 K/UL
MONOCYTES NFR BLD: 8.5 %
NEUTROPHILS # BLD AUTO: 6.2 K/UL
NEUTROPHILS NFR BLD: 65.8 %
PLATELET # BLD AUTO: 172 K/UL
PMV BLD AUTO: 7.8 FL
POTASSIUM SERPL-SCNC: 3.6 MMOL/L
PROTHROMBIN TIME: 33.1 SEC
RBC # BLD AUTO: 2.88 M/UL
SODIUM SERPL-SCNC: 139 MMOL/L
WBC # BLD AUTO: 9.4 K/UL

## 2017-08-30 PROCEDURE — 25000003 PHARM REV CODE 250: Performed by: INTERNAL MEDICINE

## 2017-08-30 PROCEDURE — S0028 INJECTION, FAMOTIDINE, 20 MG: HCPCS | Performed by: EMERGENCY MEDICINE

## 2017-08-30 PROCEDURE — 25000003 PHARM REV CODE 250: Performed by: EMERGENCY MEDICINE

## 2017-08-30 PROCEDURE — 85610 PROTHROMBIN TIME: CPT

## 2017-08-30 PROCEDURE — 85025 COMPLETE CBC W/AUTO DIFF WBC: CPT

## 2017-08-30 PROCEDURE — 96376 TX/PRO/DX INJ SAME DRUG ADON: CPT

## 2017-08-30 PROCEDURE — 94761 N-INVAS EAR/PLS OXIMETRY MLT: CPT

## 2017-08-30 PROCEDURE — 96365 THER/PROPH/DIAG IV INF INIT: CPT

## 2017-08-30 PROCEDURE — 94640 AIRWAY INHALATION TREATMENT: CPT | Mod: 76

## 2017-08-30 PROCEDURE — 80048 BASIC METABOLIC PNL TOTAL CA: CPT

## 2017-08-30 PROCEDURE — 36415 COLL VENOUS BLD VENIPUNCTURE: CPT

## 2017-08-30 PROCEDURE — 27000221 HC OXYGEN, UP TO 24 HOURS

## 2017-08-30 PROCEDURE — 25000242 PHARM REV CODE 250 ALT 637 W/ HCPCS: Performed by: INTERNAL MEDICINE

## 2017-08-30 PROCEDURE — 99217 PR OBSERVATION CARE DISCHARGE: CPT | Mod: ,,, | Performed by: INTERNAL MEDICINE

## 2017-08-30 PROCEDURE — G0378 HOSPITAL OBSERVATION PER HR: HCPCS

## 2017-08-30 PROCEDURE — 94640 AIRWAY INHALATION TREATMENT: CPT

## 2017-08-30 PROCEDURE — 96375 TX/PRO/DX INJ NEW DRUG ADDON: CPT

## 2017-08-30 RX ORDER — WARFARIN 2.5 MG/1
2.5 TABLET ORAL DAILY
Refills: 2
Start: 2017-09-01

## 2017-08-30 RX ORDER — METRONIDAZOLE 500 MG/1
500 TABLET ORAL 3 TIMES DAILY
Qty: 21 TABLET | Refills: 0 | Status: SHIPPED | OUTPATIENT
Start: 2017-08-30 | End: 2017-09-06

## 2017-08-30 RX ORDER — ONDANSETRON 4 MG/1
4 TABLET, ORALLY DISINTEGRATING ORAL ONCE
Status: COMPLETED | OUTPATIENT
Start: 2017-08-30 | End: 2017-08-30

## 2017-08-30 RX ORDER — L. ACIDOPHILUS/L.BULGARICUS 100MM CELL
1 GRANULES IN PACKET (EA) ORAL 2 TIMES DAILY
Qty: 30 TABLET | Refills: 0 | Status: SHIPPED | OUTPATIENT
Start: 2017-08-30

## 2017-08-30 RX ADMIN — GABAPENTIN 800 MG: 400 CAPSULE ORAL at 05:08

## 2017-08-30 RX ADMIN — FAMOTIDINE 20 MG: 10 INJECTION, SOLUTION INTRAVENOUS at 09:08

## 2017-08-30 RX ADMIN — ONDANSETRON 4 MG: 4 TABLET, ORALLY DISINTEGRATING ORAL at 09:08

## 2017-08-30 RX ADMIN — PANTOPRAZOLE SODIUM 40 MG: 40 TABLET, DELAYED RELEASE ORAL at 09:08

## 2017-08-30 RX ADMIN — SUCRALFATE 1 G: 1 TABLET ORAL at 05:08

## 2017-08-30 RX ADMIN — IPRATROPIUM BROMIDE AND ALBUTEROL SULFATE 3 ML: .5; 3 SOLUTION RESPIRATORY (INHALATION) at 07:08

## 2017-08-30 RX ADMIN — LACTOBACILLUS ACIDOPHILUS / LACTOBACILLUS BULGARICUS 1 EACH: 100 MILLION CFU STRENGTH GRANULES at 09:08

## 2017-08-30 RX ADMIN — VALACYCLOVIR HYDROCHLORIDE 500 MG: 500 TABLET, FILM COATED ORAL at 09:08

## 2017-08-30 RX ADMIN — TOPIRAMATE 50 MG: 25 TABLET, FILM COATED ORAL at 09:08

## 2017-08-30 RX ADMIN — FLUTICASONE FUROATE AND VILANTEROL TRIFENATATE 1 PUFF: 100; 25 POWDER RESPIRATORY (INHALATION) at 07:08

## 2017-08-30 RX ADMIN — Medication 12.5 MG: at 09:08

## 2017-08-30 NOTE — PLAN OF CARE
08/30/17 1040   Final Note   Assessment Type Final Discharge Note   Discharge Disposition Home-Health   Hospital Follow Up  Appt(s) scheduled? Yes

## 2017-08-30 NOTE — DISCHARGE SUMMARY
"Discharge Summary  Hospital Medicine    Admit Date: 8/28/2017    Date and Time: 8/30/20178:03 AM    Discharge Attending Physician: Analilia Nolasco MD    Primary Care Physician: Gilberto Rosas MD    Diagnoses:  Active Hospital Problems    Diagnosis  POA    *Enteritis [K52.9]  Yes    Severe malnutrition [E43]  Yes    Elevated INR [R79.1]  Yes    Tobacco abuse [Z72.0]  Yes    CAD (coronary artery disease) [I25.10]  Yes    HIV (human immunodeficiency virus infection) [Z21]  Yes      Resolved Hospital Problems    Diagnosis Date Resolved POA   No resolved problems to display.     Discharged Condition: Good    Hospital Course:   Patient is a 72 y.o. male admitted to Hospitalist Service from [facility] Emergency Room with complaint of abdominal pain and black stools for 2-3 weeks. Patient has PMH significant for HIV, CAD s/p stent placement, hypertension, COPD. Part of the history obtained from patient's nephew. The pt endorsed mild, cramping pain with no exacerbating or mitigating factors. He endorsed associated diarrhea x 2 days that is "black" with intermittent dark stool. Per pt's nephew, the pt's INR was recently elevated and reports that the pt had been "really pale" x several days and is "generally weak". Patient denied chest pain, shortness of breath, nausea, vomiting, headache, vision changes, focal neuro-deficits, cough or fever. Patient was admitted to Hospitalist medicine service. Patient was provided supportive care to the patient. Symptoms improved. Patient did not wish any GI work up. Stool studies remained negative. Symptoms improved with IV antibiotics. Patient was discharged home in stable condition with following discharge plan of care.     Consults: None    Significant Diagnostic Studies:   CT abdomen:  1.  Mildly dilated, fluid-filled mid and lower abdominal small bowel loops without focal zone of transition identified. Differential considerations include mild enteritis and ileus. " Consider radiographic followup if symptoms persist.  2. Masslike consolidation in the left lower lobe compatible with previous chest CT and PET CT findings from 7/12/14. Severe pulmonary emphysema is also noted. Differential considerations include neoplasm and organized pneumonia. Correlation for prior workup of the same is recommended.  3. Chronic left ventricular thrombus which is smaller in volume relative to the 2014 study.  4. Mild, nonobstructive left nephrolithiasis. No ureteral stone or obstructive uropathy noted.  5. Sigmoid colonic diverticulosis.    Microbiology Results (last 7 days)     Procedure Component Value Units Date/Time    C Diff Toxin by PCR [[id number]] Collected:  08/29/17 1826    Order Status:  Completed Updated:  08/30/17 0336     C. diff PCR Negative    Stool culture [[id number]] Collected:  08/29/17 1826    Order Status:  Sent Specimen:  Stool from Stool Updated:  08/30/17 0028    E. coli 0157 antigen [[id number]] Collected:  08/29/17 1826    Order Status:  No result Specimen:  Stool from Stool Updated:  08/30/17 0028    Clostridium difficile EIA [[id number]]  (Abnormal) Collected:  08/29/17 1826    Order Status:  Completed Specimen:  Stool from Stool Updated:  08/29/17 2203     C. diff Antigen Positive (A)     C difficile Toxins A+B, EIA Negative     Comment: Testing not recommended for children <24 months old.           Special Treatments/Procedures: None  Disposition: Home or Self Care    Medications:  Reconciled Home Medications: Current Discharge Medication List      START taking these medications    Details   lactobacillus acidophilus & bulgar (LACTINEX) 100 million cell packet Take 1 packet (1 each total) by mouth 2 (two) times daily.  Qty: 30 tablet, Refills: 0      metronidazole (FLAGYL) 500 MG tablet Take 1 tablet (500 mg total) by mouth 3 (three) times daily.  Qty: 21 tablet, Refills: 0         CONTINUE these medications which have CHANGED    Details   warfarin (COUMADIN) 2.5 MG  tablet Take 1 tablet (2.5 mg total) by mouth Daily.  Refills: 2         CONTINUE these medications which have NOT CHANGED    Details   acetaminophen (TYLENOL) 500 MG tablet Take 500 mg by mouth every 6 (six) hours as needed. 2 TABS      budesonide-formoterol 80-4.5 mcg (SYMBICORT) 80-4.5 mcg/actuation HFAA Inhale 2 puffs into the lungs.      clopidogrel (PLAVIX) 75 mg tablet Take 75 mg by mouth once daily.      cyproheptadine (PERIACTIN) 4 mg tablet Take 4 mg by mouth 3 (three) times daily as needed.      efavirenz-emtrictabine-tenofovir 600-200-300 mg (ATRIPLA) 600-200-300 mg Tab Take 1 tablet by mouth every evening.       ergocalciferol (ERGOCALCIFEROL) 50,000 unit Cap Take 50,000 Units by mouth every 7 days. Takes on Mondays      famotidine (PEPCID) 20 MG tablet Take 1 tablet (20 mg total) by mouth 2 (two) times daily.  Qty: 20 tablet, Refills: 1      fluoxetine (PROZAC) 40 MG capsule Take 40 mg by mouth nightly.       folic acid (FOLVITE) 1 MG tablet Take 1 tablet (1 mg total) by mouth once daily.  Qty: 30 tablet, Refills: 0      gabapentin (NEURONTIN) 800 MG tablet Take 900 mg by mouth 3 (three) times daily.       hydrocodone-acetaminophen (VICODIN ES) 7.5-300 mg Tab Take by mouth.      methylPREDNISolone (MEDROL DOSEPACK) 4 mg tablet follow package directions  Qty: 21 tablet, Refills: 0      metoprolol tartrate (LOPRESSOR) 25 MG tablet 12.5 mg 2 (two) times daily.   Refills: 3      morphine (MSIR) 15 MG tablet Take 15 mg by mouth every 2 (two) hours as needed for Pain.      nitroGLYCERIN (NITROSTAT) 0.3 MG SL tablet Place 0.3 mg under the tongue every 5 (five) minutes as needed for Chest pain.      ondansetron (ZOFRAN-ODT) 4 MG TbDL Take 8 mg by mouth once.      pantoprazole (PROTONIX) 40 MG tablet Take 1 tablet (40 mg total) by mouth once daily.  Qty: 30 tablet, Refills: 0      sucralfate (CARAFATE) 1 gram tablet Take 1 tablet (1 g total) by mouth 4 (four) times daily.  Qty: 30 tablet, Refills: 0       tiotropium (SPIRIVA) 18 mcg inhalation capsule Inhale 18 mcg into the lungs once daily.      topiramate (TOPAMAX) 50 MG tablet Take 50 mg by mouth 2 (two) times daily. 2 TABS HS      tramadol (ULTRAM) 50 mg tablet Take 50 mg by mouth every 6 (six) hours as needed for Pain.      valacyclovir (VALTREX) 500 MG tablet Take 500 mg by mouth 2 (two) times daily.      albuterol-ipratropium 2.5mg-0.5mg/3mL (DUO-NEB) 0.5 mg-3 mg(2.5 mg base)/3 mL nebulizer solution Take 3 mLs by nebulization every 6 (six) hours as needed for Wheezing.  Qty: 25 vial, Refills: 0         STOP taking these medications       albuterol (ACCUNEB) 0.63 mg/3 mL Nebu Comments:   Reason for Stopping:         doxycycline (VIBRAMYCIN) 100 MG Cap Comments:   Reason for Stopping:               Discharge Procedure Orders  Referral to Home health   Referral Priority: Routine Referral Type: Home Health   Referral Reason: Specialty Services Required    Requested Specialty: Home Health Services    Number of Visits Requested: 1      Diet general   Order Comments: Cardiac/ 2 gram sodium low cholesterol diet - Coumadin diet restrictions     Other restrictions (specify):   Order Comments: Fall precautions     Call MD for:   Order Comments: For worsening symptoms, chest pain, shortness of breath, increased abdominal pain, high grade fever, stroke or stroke like symptoms, immediately go to the nearest Emergency Room or call 911 as soon as possible.       Follow-up Information     [facility].    Specialty:  Home Health Services  Why:  Resume services  Contact information:  [address] [facility] [id number] [phone]             Gilberto Rosas MD In 1 week.    Specialty:  Family Medicine  Contact information:  [address] [facility] [address]  [phone]             Please follow up.    Contact information:  Hold Coumadin for 2 days. Check INR on 09-01-17, if < 2.5 resume Coumadin as before and INR monitoring as before.           Please  follow up.    Contact information:  Please do not smoke, continue supplemental oxygen as before.

## 2017-08-30 NOTE — PROGRESS NOTES
08/29/17 1644   Patient Assessment/Suction   Level of Consciousness (AVPU) alert   Respiratory Effort Unlabored   All Lung Fields Breath Sounds diminished   PRE-TX-O2-ETCO2   O2 Device (Oxygen Therapy) nasal cannula   $ Is the patient on Oxygen? Yes   Flow (L/min) 3   SpO2 98 %   Pulse Oximetry Type Intermittent   $ Pulse Oximetry - Multiple Charge Pulse Oximetry - Multiple   Pulse 61   Resp 17   Aerosol Therapy   $ Aerosol Therapy Charges Aerosol Treatment   Respiratory Treatment Status given   SVN/Inhaler Treatment Route with oxygen;mask   Position During Treatment HOB at 45 degrees   Patient Tolerance good   Post-Treatment   Post-treatment Heart Rate (beats/min) 64   Post-treatment Resp Rate (breaths/min) 18   All Fields Breath Sounds unchanged

## 2017-08-30 NOTE — PROGRESS NOTES
ZAINAB sent patient information to [facility] through [facility] for authorization and acceptance.ZAINAB Ulloa    1:00pm Per Erika with PHN, [facility] accepted patient.ZAINAB Ulloa

## 2017-08-30 NOTE — PLAN OF CARE
Problem: Patient Care Overview  Goal: Plan of Care Review  Outcome: Ongoing (interventions implemented as appropriate)  No acute distress noted Safety maintained Will continue to monitor

## 2017-08-30 NOTE — PLAN OF CARE
Problem: Patient Care Overview  Goal: Plan of Care Review  Explained to pt what gatroenteritis . Pt questioning why he was being discharged. Explained stool testing was negative and that he would be put on medication to help the good bacteria in his gut. Pt stated understanding.

## 2017-08-30 NOTE — PROGRESS NOTES
08/29/17 1921   PRE-TX-O2-ETCO2   O2 Device (Oxygen Therapy) nasal cannula   $ Is the patient on Oxygen? Yes   Flow (L/min) 3   SpO2 100 %   Pulse Oximetry Type Intermittent   $ Pulse Oximetry - Multiple Charge Pulse Oximetry - Multiple   Pulse 70   Aerosol Therapy   $ Aerosol Therapy Charges Refused

## 2017-08-31 LAB
E COLI SXT1 STL QL IA: NEGATIVE
E COLI SXT2 STL QL IA: NEGATIVE

## 2017-09-02 LAB — BACTERIA STL CULT: NORMAL

## 2017-09-13 DIAGNOSIS — R11.15 NON-INTRACTABLE CYCLICAL VOMITING WITH NAUSEA: ICD-10-CM

## 2017-09-13 DIAGNOSIS — R11.15 INTRACTABLE CYCLICAL VOMITING WITH NAUSEA: Primary | ICD-10-CM

## 2017-09-13 RX ORDER — ONDANSETRON HYDROCHLORIDE 8 MG/1
8 TABLET, FILM COATED ORAL EVERY 8 HOURS PRN
Qty: 30 TABLET | Refills: 3 | Status: ON HOLD | OUTPATIENT
Start: 2017-09-13 | End: 2017-10-19 | Stop reason: HOSPADM

## 2017-09-13 RX ORDER — ONDANSETRON HYDROCHLORIDE 8 MG/1
8 TABLET, FILM COATED ORAL EVERY 8 HOURS PRN
Qty: 30 TABLET | Refills: 3 | Status: CANCELLED | OUTPATIENT
Start: 2017-09-13

## 2017-10-12 ENCOUNTER — HOSPITAL ENCOUNTER (INPATIENT)
Facility: HOSPITAL | Age: 72
LOS: 6 days | Discharge: HOME HEALTH | DRG: 974 | End: 2017-10-19
Attending: EMERGENCY MEDICINE | Admitting: INTERNAL MEDICINE
Payer: MEDICARE

## 2017-10-12 DIAGNOSIS — J44.1 COPD EXACERBATION: ICD-10-CM

## 2017-10-12 DIAGNOSIS — I25.10 CORONARY ARTERY DISEASE, ANGINA PRESENCE UNSPECIFIED, UNSPECIFIED VESSEL OR LESION TYPE, UNSPECIFIED WHETHER NATIVE OR TRANSPLANTED HEART: ICD-10-CM

## 2017-10-12 DIAGNOSIS — R06.00 DYSPNEA, UNSPECIFIED: ICD-10-CM

## 2017-10-12 DIAGNOSIS — J44.1 CHRONIC OBSTRUCTIVE PULMONARY DISEASE WITH (ACUTE) EXACERBATION: ICD-10-CM

## 2017-10-12 DIAGNOSIS — J96.01 ACUTE RESPIRATORY FAILURE WITH HYPOXIA AND HYPERCAPNIA: ICD-10-CM

## 2017-10-12 DIAGNOSIS — R06.00 DYSPNEA, UNSPECIFIED TYPE: ICD-10-CM

## 2017-10-12 DIAGNOSIS — J18.9 HCAP (HEALTHCARE-ASSOCIATED PNEUMONIA): Primary | ICD-10-CM

## 2017-10-12 DIAGNOSIS — J96.01 ACUTE HYPOXEMIC RESPIRATORY FAILURE: ICD-10-CM

## 2017-10-12 DIAGNOSIS — I50.9 CHF (CONGESTIVE HEART FAILURE): ICD-10-CM

## 2017-10-12 DIAGNOSIS — J96.02 ACUTE RESPIRATORY FAILURE WITH HYPOXIA AND HYPERCAPNIA: ICD-10-CM

## 2017-10-12 PROBLEM — I50.21 ACUTE SYSTOLIC CHF (CONGESTIVE HEART FAILURE): Status: ACTIVE | Noted: 2017-10-12

## 2017-10-12 LAB
ALBUMIN SERPL BCP-MCNC: 2.6 G/DL
ALLENS TEST: ABNORMAL
ALP SERPL-CCNC: 115 U/L
ALT SERPL W/O P-5'-P-CCNC: <5 U/L
ANION GAP SERPL CALC-SCNC: 6 MMOL/L
AST SERPL-CCNC: 14 U/L
BACTERIA #/AREA URNS HPF: ABNORMAL /HPF
BASOPHILS # BLD AUTO: 0 K/UL
BASOPHILS NFR BLD: 0.4 %
BILIRUB SERPL-MCNC: 0.3 MG/DL
BILIRUB UR QL STRIP: NEGATIVE
BNP SERPL-MCNC: 441 PG/ML
BUN SERPL-MCNC: 14 MG/DL
CALCIUM SERPL-MCNC: 8.5 MG/DL
CHLORIDE SERPL-SCNC: 104 MMOL/L
CLARITY UR: CLEAR
CO2 SERPL-SCNC: 33 MMOL/L
COLOR UR: YELLOW
CREAT SERPL-MCNC: 0.9 MG/DL
DELSYS: ABNORMAL
DIFFERENTIAL METHOD: ABNORMAL
EOSINOPHIL # BLD AUTO: 0.3 K/UL
EOSINOPHIL NFR BLD: 3.4 %
ERYTHROCYTE [DISTWIDTH] IN BLOOD BY AUTOMATED COUNT: 17 %
EST. GFR  (AFRICAN AMERICAN): >60 ML/MIN/1.73 M^2
EST. GFR  (NON AFRICAN AMERICAN): >60 ML/MIN/1.73 M^2
GLUCOSE SERPL-MCNC: 100 MG/DL
GLUCOSE UR QL STRIP: NEGATIVE
HCO3 UR-SCNC: 30 MMOL/L (ref 24–28)
HCT VFR BLD AUTO: 31.8 %
HGB BLD-MCNC: 9.9 G/DL
HGB UR QL STRIP: ABNORMAL
HYALINE CASTS #/AREA URNS LPF: 1 /LPF
INR PPP: 1
KETONES UR QL STRIP: NEGATIVE
LDH SERPL L TO P-CCNC: 190 U/L
LEUKOCYTE ESTERASE UR QL STRIP: NEGATIVE
LYMPHOCYTES # BLD AUTO: 1.8 K/UL
LYMPHOCYTES NFR BLD: 19.1 %
MAGNESIUM SERPL-MCNC: 2 MG/DL
MCH RBC QN AUTO: 31.7 PG
MCHC RBC AUTO-ENTMCNC: 31 G/DL
MCV RBC AUTO: 102 FL
MICROSCOPIC COMMENT: ABNORMAL
MONOCYTES # BLD AUTO: 0.6 K/UL
MONOCYTES NFR BLD: 6.3 %
NEUTROPHILS # BLD AUTO: 6.6 K/UL
NEUTROPHILS NFR BLD: 70.8 %
NITRITE UR QL STRIP: NEGATIVE
PCO2 BLDA: 63.5 MMHG (ref 35–45)
PH SMN: 7.28 [PH] (ref 7.35–7.45)
PH UR STRIP: 7 [PH] (ref 5–8)
PHOSPHATE SERPL-MCNC: 2.5 MG/DL
PLATELET # BLD AUTO: 175 K/UL
PMV BLD AUTO: 8 FL
PO2 BLDA: 57 MMHG (ref 80–100)
POC BE: 3 MMOL/L
POC SATURATED O2: 84 % (ref 95–100)
POC TCO2: 32 MMOL/L (ref 23–27)
POTASSIUM SERPL-SCNC: 3.6 MMOL/L
PROT SERPL-MCNC: 6.1 G/DL
PROT UR QL STRIP: ABNORMAL
PROTHROMBIN TIME: 10.5 SEC
RBC # BLD AUTO: 3.11 M/UL
RBC #/AREA URNS HPF: 5 /HPF (ref 0–4)
SAMPLE: ABNORMAL
SITE: ABNORMAL
SODIUM SERPL-SCNC: 143 MMOL/L
SP GR UR STRIP: 1.02 (ref 1–1.03)
SQUAMOUS #/AREA URNS HPF: 1 /HPF
URN SPEC COLLECT METH UR: ABNORMAL
UROBILINOGEN UR STRIP-ACNC: NEGATIVE EU/DL
WBC # BLD AUTO: 9.3 K/UL
WBC #/AREA URNS HPF: 1 /HPF (ref 0–5)

## 2017-10-12 PROCEDURE — 27000221 HC OXYGEN, UP TO 24 HOURS

## 2017-10-12 PROCEDURE — 63600175 PHARM REV CODE 636 W HCPCS: Performed by: EMERGENCY MEDICINE

## 2017-10-12 PROCEDURE — 87040 BLOOD CULTURE FOR BACTERIA: CPT | Mod: 59

## 2017-10-12 PROCEDURE — 25000242 PHARM REV CODE 250 ALT 637 W/ HCPCS: Performed by: EMERGENCY MEDICINE

## 2017-10-12 PROCEDURE — 93005 ELECTROCARDIOGRAM TRACING: CPT

## 2017-10-12 PROCEDURE — 36415 COLL VENOUS BLD VENIPUNCTURE: CPT

## 2017-10-12 PROCEDURE — 94761 N-INVAS EAR/PLS OXIMETRY MLT: CPT

## 2017-10-12 PROCEDURE — 81000 URINALYSIS NONAUTO W/SCOPE: CPT

## 2017-10-12 PROCEDURE — 83735 ASSAY OF MAGNESIUM: CPT

## 2017-10-12 PROCEDURE — 63600175 PHARM REV CODE 636 W HCPCS: Performed by: INTERNAL MEDICINE

## 2017-10-12 PROCEDURE — 83880 ASSAY OF NATRIURETIC PEPTIDE: CPT

## 2017-10-12 PROCEDURE — 36600 WITHDRAWAL OF ARTERIAL BLOOD: CPT

## 2017-10-12 PROCEDURE — 12000002 HC ACUTE/MED SURGE SEMI-PRIVATE ROOM

## 2017-10-12 PROCEDURE — 83615 LACTATE (LD) (LDH) ENZYME: CPT

## 2017-10-12 PROCEDURE — 96361 HYDRATE IV INFUSION ADD-ON: CPT

## 2017-10-12 PROCEDURE — 93010 ELECTROCARDIOGRAM REPORT: CPT | Mod: ,,, | Performed by: INTERNAL MEDICINE

## 2017-10-12 PROCEDURE — 85025 COMPLETE CBC W/AUTO DIFF WBC: CPT

## 2017-10-12 PROCEDURE — 99223 1ST HOSP IP/OBS HIGH 75: CPT | Mod: ,,, | Performed by: INTERNAL MEDICINE

## 2017-10-12 PROCEDURE — 84100 ASSAY OF PHOSPHORUS: CPT

## 2017-10-12 PROCEDURE — 82803 BLOOD GASES ANY COMBINATION: CPT

## 2017-10-12 PROCEDURE — 85610 PROTHROMBIN TIME: CPT

## 2017-10-12 PROCEDURE — 99285 EMERGENCY DEPT VISIT HI MDM: CPT | Mod: 25

## 2017-10-12 PROCEDURE — 25000003 PHARM REV CODE 250: Performed by: EMERGENCY MEDICINE

## 2017-10-12 PROCEDURE — 99900035 HC TECH TIME PER 15 MIN (STAT)

## 2017-10-12 PROCEDURE — 94640 AIRWAY INHALATION TREATMENT: CPT

## 2017-10-12 PROCEDURE — 96375 TX/PRO/DX INJ NEW DRUG ADDON: CPT

## 2017-10-12 PROCEDURE — 96365 THER/PROPH/DIAG IV INF INIT: CPT

## 2017-10-12 PROCEDURE — 80053 COMPREHEN METABOLIC PANEL: CPT

## 2017-10-12 RX ORDER — CEPHALEXIN 500 MG/1
500 CAPSULE ORAL EVERY 8 HOURS
Status: ON HOLD | COMMUNITY
Start: 2017-10-07 | End: 2017-10-19 | Stop reason: HOSPADM

## 2017-10-12 RX ORDER — ENOXAPARIN SODIUM 100 MG/ML
1 INJECTION SUBCUTANEOUS
Status: DISCONTINUED | OUTPATIENT
Start: 2017-10-12 | End: 2017-10-15

## 2017-10-12 RX ORDER — ONDANSETRON 2 MG/ML
4 INJECTION INTRAMUSCULAR; INTRAVENOUS EVERY 8 HOURS PRN
Status: DISCONTINUED | OUTPATIENT
Start: 2017-10-12 | End: 2017-10-19 | Stop reason: HOSPADM

## 2017-10-12 RX ORDER — IPRATROPIUM BROMIDE AND ALBUTEROL SULFATE 2.5; .5 MG/3ML; MG/3ML
3 SOLUTION RESPIRATORY (INHALATION)
Status: DISCONTINUED | OUTPATIENT
Start: 2017-10-12 | End: 2017-10-12

## 2017-10-12 RX ORDER — CEFEPIME HYDROCHLORIDE 1 G/50ML
1 INJECTION, SOLUTION INTRAVENOUS
Status: COMPLETED | OUTPATIENT
Start: 2017-10-12 | End: 2017-10-12

## 2017-10-12 RX ORDER — ALBUTEROL SULFATE 2.5 MG/.5ML
2.5 SOLUTION RESPIRATORY (INHALATION) EVERY 4 HOURS PRN
Status: DISCONTINUED | OUTPATIENT
Start: 2017-10-12 | End: 2017-10-14

## 2017-10-12 RX ORDER — ALBUTEROL SULFATE 90 UG/1
2 AEROSOL, METERED RESPIRATORY (INHALATION) EVERY 6 HOURS PRN
COMMUNITY

## 2017-10-12 RX ORDER — ACETAMINOPHEN 325 MG/1
650 TABLET ORAL EVERY 6 HOURS PRN
Status: DISCONTINUED | OUTPATIENT
Start: 2017-10-12 | End: 2017-10-14

## 2017-10-12 RX ORDER — AMOXICILLIN 250 MG
1 CAPSULE ORAL 2 TIMES DAILY PRN
Status: DISCONTINUED | OUTPATIENT
Start: 2017-10-12 | End: 2017-10-19 | Stop reason: HOSPADM

## 2017-10-12 RX ORDER — CIPROFLOXACIN 500 MG/1
500 TABLET ORAL
Status: COMPLETED | OUTPATIENT
Start: 2017-10-12 | End: 2017-10-12

## 2017-10-12 RX ORDER — METHYLPREDNISOLONE SOD SUCC 125 MG
125 VIAL (EA) INJECTION
Status: COMPLETED | OUTPATIENT
Start: 2017-10-12 | End: 2017-10-12

## 2017-10-12 RX ORDER — FUROSEMIDE 10 MG/ML
20 INJECTION INTRAMUSCULAR; INTRAVENOUS ONCE
Status: COMPLETED | OUTPATIENT
Start: 2017-10-12 | End: 2017-10-12

## 2017-10-12 RX ORDER — CIPROFLOXACIN 2 MG/ML
400 INJECTION, SOLUTION INTRAVENOUS
Status: DISCONTINUED | OUTPATIENT
Start: 2017-10-13 | End: 2017-10-14

## 2017-10-12 RX ORDER — ALBUTEROL SULFATE 2.5 MG/.5ML
2.5 SOLUTION RESPIRATORY (INHALATION) EVERY 4 HOURS PRN
Status: DISCONTINUED | OUTPATIENT
Start: 2017-10-12 | End: 2017-10-12 | Stop reason: SDUPTHER

## 2017-10-12 RX ORDER — AZITHROMYCIN 500 MG/1
500 TABLET, FILM COATED ORAL DAILY
Status: ON HOLD | COMMUNITY
Start: 2017-10-07 | End: 2017-10-19 | Stop reason: HOSPADM

## 2017-10-12 RX ORDER — CHOLESTYRAMINE 4 G/9G
4 POWDER, FOR SUSPENSION ORAL 2 TIMES DAILY
COMMUNITY

## 2017-10-12 RX ORDER — PANTOPRAZOLE SODIUM 40 MG/1
40 TABLET, DELAYED RELEASE ORAL DAILY
Status: DISCONTINUED | OUTPATIENT
Start: 2017-10-13 | End: 2017-10-19 | Stop reason: HOSPADM

## 2017-10-12 RX ADMIN — VANCOMYCIN HYDROCHLORIDE 1000 MG: 1 INJECTION, POWDER, LYOPHILIZED, FOR SOLUTION INTRAVENOUS at 07:10

## 2017-10-12 RX ADMIN — ONDANSETRON 4 MG: 2 INJECTION INTRAMUSCULAR; INTRAVENOUS at 09:10

## 2017-10-12 RX ADMIN — CIPROFLOXACIN HYDROCHLORIDE 500 MG: 500 TABLET, FILM COATED ORAL at 05:10

## 2017-10-12 RX ADMIN — PIPERACILLIN SODIUM AND TAZOBACTAM SODIUM 4.5 G: 4; .5 INJECTION, POWDER, LYOPHILIZED, FOR SOLUTION INTRAVENOUS at 09:10

## 2017-10-12 RX ADMIN — CEFEPIME HYDROCHLORIDE 1 G: 1 INJECTION, SOLUTION INTRAVENOUS at 06:10

## 2017-10-12 RX ADMIN — ALBUTEROL SULFATE 2.5 MG: 2.5 SOLUTION RESPIRATORY (INHALATION) at 10:10

## 2017-10-12 RX ADMIN — METHYLPREDNISOLONE SODIUM SUCCINATE 125 MG: 125 INJECTION, POWDER, FOR SOLUTION INTRAMUSCULAR; INTRAVENOUS at 03:10

## 2017-10-12 RX ADMIN — FUROSEMIDE 20 MG: 10 INJECTION, SOLUTION INTRAVENOUS at 06:10

## 2017-10-12 RX ADMIN — ALBUTEROL SULFATE 2.5 MG: 2.5 SOLUTION RESPIRATORY (INHALATION) at 03:10

## 2017-10-12 RX ADMIN — SODIUM CHLORIDE 1000 ML: 0.9 INJECTION, SOLUTION INTRAVENOUS at 03:10

## 2017-10-12 RX ADMIN — ENOXAPARIN SODIUM 50 MG: 100 INJECTION SUBCUTANEOUS at 09:10

## 2017-10-12 NOTE — H&P
PCP: Gilberto Rosas MD    History & Physical    Chief Complaint: Worsening SOB for 1 week    History of Present Illness:  Mr. Lerner is a 72yo Wm with a PMH COPD, MI, HIV, and HTN. He presents to the hospital [facility] with c/o worsening SOB and wheezing over 1 week. It is associated with chills, decreased appetite, and fatigue. He is not sure if he had fever or not. He is also having diarrhea and nausea without vomiting. He is on warfarin therapy, which he states that he has a blood clot in his heart, and his INR was found to be >10.0. Vitamin K was administered in the ED. He denies any bleeding. He currently denies any pain or discomforts.    Past Medical History:   Diagnosis Date    Arthritis     Asbestos exposure     COPD (chronic obstructive pulmonary disease)     O2 AT NITE PRN    Coronary artery disease     STENT X 1    Depression     HIV (human immunodeficiency virus infection)     Hypertension     NO MED NOW    Migraines     Myocardial infarction     Presence of dental bridge     UPPER AND LOWER    RLS (restless legs syndrome)     Wears glasses      Past Surgical History:   Procedure Laterality Date    CORONARY STENT PLACEMENT      FACIAL FRACTURE SURGERY  metal in left brow and cheek    's    finger scraped Right     index finger scraped at [facility] main for staph infection    rectal warts  2013    warts removed       Family History   Problem Relation Age of Onset    COPD Mother     Cancer Mother       of lung ca w/mets to stomach    Asbestos Mother     Vision loss Sister     Blindness Sister      with bilatereal removal     Depression Sister     Cancer Brother      lung    COPD Brother     Stroke Maternal Aunt     Heart disease Maternal Grandmother      pacemaker    Stroke Maternal Grandmother      Social History   Substance Use Topics    Smoking status: Current Every Day Smoker     Packs/day: 0.50     Years: 50.00     Types: Cigarettes     Last attempt to quit:  "1/16/2015    Smokeless tobacco: Never Used      Comment: Pt states "already have all the information"    Alcohol use No      Review of patient's allergies indicates:   Allergen Reactions    Unable to assess Other (See Comments)     REX (DRUG FOR HIV) TAKEN 1989 ALMOST PARALYZED LEGS    Aztreonam Nausea And Vomiting       (Not in a hospital admission)  Review of Systems:  Constitutional: Positive for appetite change, chills and fatigue.   HENT: Negative for congestion, trouble swallowing and voice change.    Eyes: Negative for visual disturbance.   Respiratory: Positive for shortness of breath. Negative for cough and chest tightness.    Cardiovascular: Negative for chest pain and leg swelling.   Gastrointestinal: Positive for diarrhea and nausea. Negative for abdominal pain and vomiting.   Genitourinary: Negative for dysuria.   Musculoskeletal: Positive for arthralgias.        Hx Arthritis   Skin: Negative for wound.   Neurological: Negative for dizziness, seizures and syncope.   Hematological: Bruises/bleeds easily.   Psychiatric/Behavioral: Negative for confusion and hallucinations.      OBJECTIVE:     Vital Signs (Most Recent)  Temp: 97.7 °F (36.5 °C) (10/12/17 1324)  Pulse: 90 (10/12/17 1511)  Resp: 18 (10/12/17 1511)  BP: (!) 150/74 (10/12/17 1324)  SpO2: (!) 85 % (10/12/17 1324)    Physical Exam:  HENT:   Head: Normocephalic and atraumatic.   Eyes: Conjunctivae are normal. Pupils are equal, round, and reactive to light.   Neck: Normal range of motion. Neck supple. No JVD present. No tracheal deviation present.   Cardiovascular: Normal rate, regular rhythm, normal heart sounds and intact distal pulses.    No murmur heard.  NSR on telemetry   Pulmonary/Chest:   Mildly tachypneic/ Decreased air movement throughout/ Wearing O2 via NC   Abdominal: Soft. Bowel sounds are normal. He exhibits no distension. There is no tenderness.   Musculoskeletal: Normal range of motion. He exhibits no edema.   Generalized " weakness   Neurological: He is alert and oriented to person, place, and time. No cranial nerve deficit.   Skin: Skin is warm and dry. There is pallor.   Psychiatric: He has a normal mood and affect. His behavior is normal. Judgment and thought content normal.     Laboratory:   CBC:   Recent Labs  Lab 10/12/17  1445   WBC 9.30   RBC 3.11*   HGB 9.9*   HCT 31.8*      *   MCH 31.7*   MCHC 31.0*     CMP:   Recent Labs  Lab 10/12/17  1445      CALCIUM 8.5*   ALBUMIN 2.6*   PROT 6.1      K 3.6   CO2 33*      BUN 14   CREATININE 0.9   ALKPHOS 115   ALT <5*   AST 14   BILITOT 0.3   BNP: 441    Coagulation:   Recent Labs  Lab 10/12/17  1445   LABPROT 10.5   INR 1.0     Microbiology Results (last 7 days)     Procedure Component Value Units Date/Time    Blood Culture #2 [[id number]] Collected:  10/12/17 1726    Order Status:  Sent Specimen:  Blood Updated:  10/12/17 1726    Blood Culture #1 [[id number]] Collected:  10/12/17 1715    Order Status:  Sent Specimen:  Blood Updated:  10/12/17 1715          Recent Labs  Lab 10/12/17  1743   COLORU Yellow   SPECGRAV 1.020   PHUR 7.0   PROTEINUA Trace*   BACTERIA Rare   NITRITE Negative   LEUKOCYTESUR Negative   UROBILINOGEN Negative   HYALINECASTS 1       Hemoglobin A1C   Date Value Ref Range Status   06/08/2017 5.2 4.5 - 6.2 % Final     Comment:     According to ADA guidelines, hemoglobin A1C <7.0% represents  optimal control in non-pregnant diabetic patients.  Different  metrics may apply to specific populations.   Standards of Medical Care in Diabetes - 2016.  For the purpose of screening for the presence of diabetes:  <5.7%     Consistent with the absence of diabetes  5.7-6.4%  Consistent with increasing risk for diabetes   (prediabetes)  >or=6.5%  Consistent with diabetes  Currently no consensus exists for use of hemoglobin A1C  for diagnosis of diabetes for children.       Microbiology Results (last 7 days)     Procedure Component Value Units  Date/Time    Blood Culture #2 [[id number]] Collected:  10/12/17 1726    Order Status:  Sent Specimen:  Blood Updated:  10/12/17 1726    Blood Culture #1 [[id number]] Collected:  10/12/17 1715    Order Status:  Sent Specimen:  Blood Updated:  10/12/17 1715        Diagnostic Results:  Chest X-Ray: Increase in bilateral infiltrates suggesting pulmonary edema.  Small left pleural effusion    Assessment/Plan:      Acute Hypoxic respiratory failure  Chronic obstructive pulmonary disease with acute exacerbation  O2 therapy  Nebulizers  Steroids  Continue IV antibiotics.  Monitor for an improvement in condition    Acute Systolic CHF exacerbation  Supplemental O2 via nasal canula; titrate O2 saturation to >92%.  Serial Cardiac enzymes × 3.  Lasix 20 MG IV now. Monitor electrolytes for hypokalemia and hypomagnesemia.  Cardiology Consultation.  2-D Echocardiogram for evaluation of left ventricle systolic function or any valvular heart disease.  Daily weights.  Strict I/Os.  Fluid restriction.  Na restriction <2g/d.    Essential hypertension-Controlled  Chronic. Continue home BP medications and adjust as needed.    Tobacco abuse  Smoking cessation encouraged  Nicotine patch     HIV (human immunodeficiency virus infection)  Chronic. Continue home HIV medications.    VTE Risk Mitigation         Ordered     enoxaparin injection 50 mg  Every 12 hours (non-standard times)     Route:  Subcutaneous        10/12/17 1826        Analilia Nolasco MD  Department of Hospital Medicine   [facility]

## 2017-10-12 NOTE — SIGNIFICANT EVENT
Results for POP SCHAFER (MRN [id number]) as of 10/12/2017 18:23   Ref. Range 10/12/2017 18:08   POC PH Latest Ref Range: 7.35 - 7.45  7.282 (LL)   POC PCO2 Latest Ref Range: 35 - 45 mmHg 63.5 (HH)   POC PO2 Latest Ref Range: 80 - 100 mmHg 57 (LL)   POC BE Latest Ref Range: -2 to 2 mmol/L 3   POC HCO3 Latest Ref Range: 24 - 28 mmol/L 30.0 (H)   POC SATURATED O2 Latest Ref Range: 95 - 100 % 84 (L)   POC TCO2 Latest Ref Range: 23 - 27 mmol/L 32 (H)   Sample Unknown ARTERIAL   DelSys Unknown Nasal Can   Allens Test Unknown N/A   Site Unknown RB   Reported to Dr. Anaya

## 2017-10-12 NOTE — ED PROVIDER NOTES
Encounter Date: 10/12/2017    SCRIBE #1 NOTE: Luann LASSITER, am scribing for, and in the presence of, Dr. Anaya.       History     Chief Complaint   Patient presents with    Shortness of Breath     recent pneumonia     10/12/2017  2:03 PM     Chief Complaint: SOB    The patient is a 72 y.o. male with PMHx of HIV, MI, CAD, HTN, COPD, asbestos exposure, migraines, depression, who presents with shortness of breath. Patient c/o gradual onset of increasing shortness of breath which has been constant for the past few days. Pt is usually on 4 L of oxygen at home. Associated symptoms include cough and fatigue. Denies any fever, nausea, vomiting, diarrhea, abdominal pain or leg swelling. The patient was recently admitted for non PCP pneumonia at [facility] 1 week ago. His last CD4 count was approximately six hundred. Pt reports a home health nurse visit once a week.  Shx of coronary stent placement, facial fx surgery, warts removed, rectal warts.      The history is provided by the patient.     Review of patient's allergies indicates:   Allergen Reactions    Unable to assess Other (See Comments)     EVELINIAN (DRUG FOR HIV) TAKEN 1989 ALMOST PARALYZED LEGS    Aztreonam Nausea And Vomiting     Past Medical History:   Diagnosis Date    Arthritis     Asbestos exposure     COPD (chronic obstructive pulmonary disease)     O2 AT NITE PRN    Coronary artery disease     STENT X 1    Depression     HIV (human immunodeficiency virus infection)     Hypertension     NO MED NOW    Migraines     Myocardial infarction     Presence of dental bridge     UPPER AND LOWER    RLS (restless legs syndrome)     Wears glasses      Past Surgical History:   Procedure Laterality Date    CORONARY STENT PLACEMENT      FACIAL FRACTURE SURGERY  metal in left brow and cheek    1970's    finger scraped Right     index finger scraped at S main for staph infection    rectal warts  2013    warts removed       Family History   Problem Relation Age  "of Onset    COPD Mother     Cancer Mother       of lung ca w/mets to stomach    Asbestos Mother     Vision loss Sister     Blindness Sister      with bilatereal removal     Depression Sister     Cancer Brother      lung    COPD Brother     Stroke Maternal Aunt     Heart disease Maternal Grandmother      pacemaker    Stroke Maternal Grandmother      Social History   Substance Use Topics    Smoking status: Current Every Day Smoker     Packs/day: 0.50     Years: 50.00     Types: Cigarettes     Last attempt to quit: 2015    Smokeless tobacco: Never Used      Comment: Pt states "already have all the information"    Alcohol use No     Review of Systems   Constitutional: Positive for fatigue. Negative for appetite change, chills and fever.   HENT: Negative for congestion, rhinorrhea and sore throat.    Respiratory: Positive for cough and shortness of breath.    Cardiovascular: Negative for chest pain and leg swelling.   Gastrointestinal: Negative for abdominal pain, diarrhea, nausea and vomiting.   Genitourinary: Negative for dysuria.   Musculoskeletal: Negative for back pain and myalgias.   Skin: Negative for rash.   Neurological: Negative for weakness and numbness.   Hematological: Does not bruise/bleed easily.   All other systems reviewed and are negative.      Physical Exam     Initial Vitals [10/12/17 1324]   BP Pulse Resp Temp SpO2   (!) 150/74 97 (!) 24 97.7 °F (36.5 °C) (!) 85 %      MAP       99.33         Physical Exam    Nursing note and vitals reviewed.  Constitutional: He appears cachectic.   Weak appearing.   HENT:   Head: Normocephalic and atraumatic.   Mouth/Throat: Mucous membranes are normal.   Eyes: EOM are normal. Pupils are equal, round, and reactive to light.   Neck: Normal range of motion.   Cardiovascular: Normal rate, regular rhythm, normal heart sounds, intact distal pulses and normal pulses. Exam reveals no gallop and no friction rub.    No murmur heard.  Pulses:       " Radial pulses are 2+ on the right side, and 2+ on the left side.        Dorsalis pedis pulses are 2+ on the right side, and 2+ on the left side.        Posterior tibial pulses are 2+ on the right side, and 2+ on the left side.   Pulmonary/Chest: He has wheezes (expiratory).   Right lung sounds. Diminished lung sounds bilaterally. Labored breathing.   Abdominal: Soft. He exhibits no distension. There is no tenderness.   Musculoskeletal: Normal range of motion. He exhibits no edema.   Neurological: He is alert and oriented to person, place, and time.   Skin: Skin is dry. Bruising (scattered) noted. No rash noted.   Psychiatric: He has a normal mood and affect.         ED Course   Procedures  Labs Reviewed   CBC W/ AUTO DIFFERENTIAL - Abnormal; Notable for the following:        Result Value    RBC 3.11 (*)     Hemoglobin 9.9 (*)     Hematocrit 31.8 (*)      (*)     MCH 31.7 (*)     MCHC 31.0 (*)     RDW 17.0 (*)     MPV 8.0 (*)     All other components within normal limits   COMPREHENSIVE METABOLIC PANEL - Abnormal; Notable for the following:     CO2 33 (*)     Calcium 8.5 (*)     Albumin 2.6 (*)     ALT <5 (*)     Anion Gap 6 (*)     All other components within normal limits   URINALYSIS - Abnormal; Notable for the following:     Protein, UA Trace (*)     Occult Blood UA 1+ (*)     All other components within normal limits   PHOSPHORUS - Abnormal; Notable for the following:     Phosphorus 2.5 (*)     All other components within normal limits   B-TYPE NATRIURETIC PEPTIDE - Abnormal; Notable for the following:      (*)     All other components within normal limits    Narrative:     Add on   URINALYSIS MICROSCOPIC - Abnormal; Notable for the following:     RBC, UA 5 (*)     All other components within normal limits   ISTAT PROCEDURE - Abnormal; Notable for the following:     POC PH 7.282 (*)     POC PCO2 63.5 (*)     POC PO2 57 (*)     POC HCO3 30.0 (*)     POC SATURATED O2 84 (*)     POC TCO2 32 (*)      All other components within normal limits   PROTIME-INR   MAGNESIUM   LACTATE DEHYDROGENASE    Narrative:     Add on          X-Rays:   Independently Interpreted Readings:   Other Readings:  Chest x-ray AP portable: Bilateral infiltrates concerning for pneumonia versus pulmonary edema and left pleural effusion.    Medical Decision Making:   History:   Old Medical Records: I decided to obtain old medical records.  Initial Assessment:   72-year-old man with history of HIV/AIDS who presents emergency department for evaluation of worsening shortness of breath, cough after being discharged from [facility] for treatment of community-acquired pneumonia and COPD exacerbation one week ago.  She states he's feels extremely fatigued and cannot get out of bed.  He feels very short of breath even at rest.  He is noted to be hypoxic on 3-4 L of oxygen which he is on at home.  Also noted to have labored breathing with diminished breath sounds bilaterally and expiratory wheezing.  Frontal diagnosis includes but is not limited to healthcare associated pneumonia, PCP pneumonia, COPD exacerbation.  He is currently afebrile.  White blood cell is 9.3.  LDH is normal at 190.  ABG reveals a pH of 7.28, a CO2 of 63, PaO2 of 57.  Chest x-ray reveals bilateral pulmonary infiltrates  Which could include pneumonia versus pulmonary edema.  BNP is slightly elevated at 441.  Patient received breathing treatments with improvement.  He refuses ipratropium since this makes him very jittery and did accept taking albuterol.  He received 125 Solu-Medrol.  He still feels very short of breath and I believe he needs to be admitted for continued breathing treatments and IV antibiotics for possible pneumonia.  Discussed with Dr. Nolasco who agrees to admit the patient.  ED Management:  Critical Care Time MDM    The high probability of sudden, clinically significant deterioration in the patient's condition required the highest level of my  preparedness to intervene urgently.    The services I provided to this patient were to treat and/or prevent clinically significant deterioration that could result in permanent disability, chronic pain and/or death.     Services included the following: chart data review, reviewing nursing notes and/or old charts, documentation time, consultant collaboration regarding findings and treatment options, medication orders and management, direct patient care, vital sign assessments and ordering, interpreting and reviewing diagnostic studies/lab tests.    Aggregate critical care time was between 30 and 74 minutes, which includes only time during which I was engaged in work directly related to the patient's care, as described above, whether at the bedside or elsewhere in the Emergency Department.     Kannan Anaya M.D.                 Scribe Attestation:   Scribe #1: I performed the above scribed service and the documentation accurately describes the services I performed. I attest to the accuracy of the note.    Attending Attestation:           Physician Attestation for Scribe:  Physician Attestation Statement for Scribe #1: I, Dr. Anaya, reviewed documentation, as scribed by Luann Dietrich in my presence, and it is both accurate and complete.             I, Héctor Cook, personally performed the services described in this documentation. All medical record entries made by the scribe were at my direction and in my presence.  I have reviewed the chart and agree that the record reflects my personal performance and is accurate and complete. Kannan Anaya MD.  9:22 PM 10/12/2017       ED Course      Clinical Impression:   The primary encounter diagnosis was COPD exacerbation. Diagnoses of Dyspnea, unspecified type, Acute hypoxemic respiratory failure, Coronary artery disease, angina presence unspecified, unspecified vessel or lesion type, unspecified whether native or transplanted heart, and CHF (congestive heart  failure) were also pertinent to this visit.                           Kannan Anaya MD  10/12/17 2122       Kannan Anaya MD  11/01/17 2028

## 2017-10-12 NOTE — ED NOTES
Pharmacy phoned about iv meds. Pharmacy stated meds were in the pyxis both were greyed out. anither nurse in med room to witness no medication loaded.

## 2017-10-13 LAB
ANION GAP SERPL CALC-SCNC: 6 MMOL/L
BASOPHILS # BLD AUTO: 0 K/UL
BASOPHILS NFR BLD: 0.3 %
BUN SERPL-MCNC: 14 MG/DL
CALCIUM SERPL-MCNC: 8.6 MG/DL
CHLORIDE SERPL-SCNC: 103 MMOL/L
CO2 SERPL-SCNC: 35 MMOL/L
CREAT SERPL-MCNC: 0.9 MG/DL
DIFFERENTIAL METHOD: ABNORMAL
EOSINOPHIL # BLD AUTO: 0 K/UL
EOSINOPHIL NFR BLD: 0 %
ERYTHROCYTE [DISTWIDTH] IN BLOOD BY AUTOMATED COUNT: 16.7 %
EST. GFR  (AFRICAN AMERICAN): >60 ML/MIN/1.73 M^2
EST. GFR  (NON AFRICAN AMERICAN): >60 ML/MIN/1.73 M^2
GLUCOSE SERPL-MCNC: 98 MG/DL
HCT VFR BLD AUTO: 30 %
HGB BLD-MCNC: 9.6 G/DL
INR PPP: 1
LYMPHOCYTES # BLD AUTO: 1.4 K/UL
LYMPHOCYTES NFR BLD: 17.2 %
MCH RBC QN AUTO: 32 PG
MCHC RBC AUTO-ENTMCNC: 32.1 G/DL
MCV RBC AUTO: 100 FL
MONOCYTES # BLD AUTO: 0.6 K/UL
MONOCYTES NFR BLD: 7.2 %
NEUTROPHILS # BLD AUTO: 6 K/UL
NEUTROPHILS NFR BLD: 75.3 %
PLATELET # BLD AUTO: 171 K/UL
PMV BLD AUTO: 8.2 FL
POTASSIUM SERPL-SCNC: 3.7 MMOL/L
PROTHROMBIN TIME: 10.8 SEC
RBC # BLD AUTO: 3.01 M/UL
SODIUM SERPL-SCNC: 144 MMOL/L
WBC # BLD AUTO: 8 K/UL

## 2017-10-13 PROCEDURE — 63600175 PHARM REV CODE 636 W HCPCS: Performed by: SPECIALIST

## 2017-10-13 PROCEDURE — C8929 TTE W OR WO FOL WCON,DOPPLER: HCPCS

## 2017-10-13 PROCEDURE — 85610 PROTHROMBIN TIME: CPT

## 2017-10-13 PROCEDURE — 25000003 PHARM REV CODE 250: Performed by: EMERGENCY MEDICINE

## 2017-10-13 PROCEDURE — 25000003 PHARM REV CODE 250: Performed by: NURSE PRACTITIONER

## 2017-10-13 PROCEDURE — 36415 COLL VENOUS BLD VENIPUNCTURE: CPT

## 2017-10-13 PROCEDURE — 99900035 HC TECH TIME PER 15 MIN (STAT)

## 2017-10-13 PROCEDURE — 80048 BASIC METABOLIC PNL TOTAL CA: CPT

## 2017-10-13 PROCEDURE — G0378 HOSPITAL OBSERVATION PER HR: HCPCS

## 2017-10-13 PROCEDURE — 12000002 HC ACUTE/MED SURGE SEMI-PRIVATE ROOM

## 2017-10-13 PROCEDURE — 63600175 PHARM REV CODE 636 W HCPCS: Performed by: INTERNAL MEDICINE

## 2017-10-13 PROCEDURE — 85025 COMPLETE CBC W/AUTO DIFF WBC: CPT

## 2017-10-13 PROCEDURE — 94761 N-INVAS EAR/PLS OXIMETRY MLT: CPT

## 2017-10-13 PROCEDURE — 63600175 PHARM REV CODE 636 W HCPCS: Performed by: EMERGENCY MEDICINE

## 2017-10-13 PROCEDURE — 25000003 PHARM REV CODE 250: Performed by: INTERNAL MEDICINE

## 2017-10-13 PROCEDURE — 27000221 HC OXYGEN, UP TO 24 HOURS

## 2017-10-13 PROCEDURE — 99233 SBSQ HOSP IP/OBS HIGH 50: CPT | Mod: ,,, | Performed by: INTERNAL MEDICINE

## 2017-10-13 RX ORDER — ALPRAZOLAM 0.25 MG/1
0.5 TABLET ORAL 2 TIMES DAILY PRN
Status: DISCONTINUED | OUTPATIENT
Start: 2017-10-13 | End: 2017-10-14

## 2017-10-13 RX ORDER — GABAPENTIN 100 MG/1
CAPSULE ORAL
Status: DISCONTINUED
Start: 2017-10-13 | End: 2017-10-13 | Stop reason: WASHOUT

## 2017-10-13 RX ORDER — GABAPENTIN 300 MG/1
900 CAPSULE ORAL 3 TIMES DAILY
Status: DISCONTINUED | OUTPATIENT
Start: 2017-10-13 | End: 2017-10-13

## 2017-10-13 RX ORDER — WARFARIN 2.5 MG/1
TABLET ORAL
Status: DISPENSED
Start: 2017-10-13 | End: 2017-10-13

## 2017-10-13 RX ORDER — EMTRICITABINE 200 MG/1
200 CAPSULE ORAL NIGHTLY
Status: DISCONTINUED | OUTPATIENT
Start: 2017-10-13 | End: 2017-10-19 | Stop reason: HOSPADM

## 2017-10-13 RX ORDER — VALACYCLOVIR HYDROCHLORIDE 500 MG/1
500 TABLET, FILM COATED ORAL 2 TIMES DAILY
Status: DISCONTINUED | OUTPATIENT
Start: 2017-10-13 | End: 2017-10-19 | Stop reason: HOSPADM

## 2017-10-13 RX ORDER — GABAPENTIN 300 MG/1
900 CAPSULE ORAL 3 TIMES DAILY
Status: DISCONTINUED | OUTPATIENT
Start: 2017-10-13 | End: 2017-10-19 | Stop reason: HOSPADM

## 2017-10-13 RX ORDER — FLUOXETINE HYDROCHLORIDE 20 MG/1
40 CAPSULE ORAL NIGHTLY
Status: DISCONTINUED | OUTPATIENT
Start: 2017-10-13 | End: 2017-10-19 | Stop reason: HOSPADM

## 2017-10-13 RX ORDER — EFAVIRENZ 600 MG/1
600 TABLET, FILM COATED ORAL NIGHTLY
Status: DISCONTINUED | OUTPATIENT
Start: 2017-10-13 | End: 2017-10-19 | Stop reason: HOSPADM

## 2017-10-13 RX ORDER — METOPROLOL TARTRATE 25 MG/1
12.5 TABLET ORAL 2 TIMES DAILY
Status: DISCONTINUED | OUTPATIENT
Start: 2017-10-13 | End: 2017-10-14

## 2017-10-13 RX ORDER — GABAPENTIN 300 MG/1
CAPSULE ORAL
Status: DISPENSED
Start: 2017-10-13 | End: 2017-10-13

## 2017-10-13 RX ORDER — FUROSEMIDE 10 MG/ML
20 INJECTION INTRAMUSCULAR; INTRAVENOUS DAILY
Status: DISCONTINUED | OUTPATIENT
Start: 2017-10-13 | End: 2017-10-14

## 2017-10-13 RX ORDER — EFAVIRENZ, EMTRICITABINE AND TENOFOVIR DISOPROXIL FUMARATE 600; 200; 300 MG/1; MG/1; MG/1
1 TABLET, FILM COATED ORAL NIGHTLY
Status: DISCONTINUED | OUTPATIENT
Start: 2017-10-13 | End: 2017-10-13 | Stop reason: SDUPTHER

## 2017-10-13 RX ORDER — METOPROLOL TARTRATE 25 MG/1
TABLET ORAL
Status: DISPENSED
Start: 2017-10-13 | End: 2017-10-13

## 2017-10-13 RX ORDER — TENOFOVIR DISOPROXIL FUMARATE 300 MG/1
300 TABLET, FILM COATED ORAL NIGHTLY
Status: DISCONTINUED | OUTPATIENT
Start: 2017-10-13 | End: 2017-10-19 | Stop reason: HOSPADM

## 2017-10-13 RX ORDER — TOPIRAMATE 100 MG/1
TABLET, FILM COATED ORAL
Status: DISPENSED
Start: 2017-10-13 | End: 2017-10-13

## 2017-10-13 RX ORDER — MORPHINE SULFATE ORAL SOLUTION 10 MG/5ML
15 SOLUTION ORAL EVERY 6 HOURS PRN
Status: DISCONTINUED | OUTPATIENT
Start: 2017-10-13 | End: 2017-10-19 | Stop reason: HOSPADM

## 2017-10-13 RX ORDER — AZITHROMYCIN 250 MG/1
500 TABLET, FILM COATED ORAL DAILY
Status: DISCONTINUED | OUTPATIENT
Start: 2017-10-13 | End: 2017-10-14

## 2017-10-13 RX ORDER — TOPIRAMATE 25 MG/1
100 TABLET ORAL NIGHTLY
Status: DISCONTINUED | OUTPATIENT
Start: 2017-10-13 | End: 2017-10-19 | Stop reason: HOSPADM

## 2017-10-13 RX ORDER — WARFARIN 2.5 MG/1
2.5 TABLET ORAL DAILY
Status: DISCONTINUED | OUTPATIENT
Start: 2017-10-13 | End: 2017-10-15

## 2017-10-13 RX ADMIN — GABAPENTIN 900 MG: 300 CAPSULE ORAL at 10:10

## 2017-10-13 RX ADMIN — ENOXAPARIN SODIUM 50 MG: 100 INJECTION SUBCUTANEOUS at 08:10

## 2017-10-13 RX ADMIN — EMTRICITABINE 200 MG: 200 CAPSULE ORAL at 10:10

## 2017-10-13 RX ADMIN — TENOFOVIR DISOPROXIL FUMARATE 300 MG: 300 TABLET, COATED ORAL at 01:10

## 2017-10-13 RX ADMIN — ONDANSETRON 4 MG: 2 INJECTION INTRAMUSCULAR; INTRAVENOUS at 11:10

## 2017-10-13 RX ADMIN — CIPROFLOXACIN 400 MG: 2 INJECTION, SOLUTION INTRAVENOUS at 05:10

## 2017-10-13 RX ADMIN — WARFARIN SODIUM 2.5 MG: 2.5 TABLET ORAL at 01:10

## 2017-10-13 RX ADMIN — Medication 12.5 MG: at 01:10

## 2017-10-13 RX ADMIN — HUMAN ALBUMIN MICROSPHERES AND PERFLUTREN 0.11 MG: 10; .22 INJECTION, SOLUTION INTRAVENOUS at 04:10

## 2017-10-13 RX ADMIN — Medication 12.5 MG: at 10:10

## 2017-10-13 RX ADMIN — AZITHROMYCIN 500 MG: 250 TABLET, FILM COATED ORAL at 10:10

## 2017-10-13 RX ADMIN — EFAVIRENZ 600 MG: 600 TABLET, FILM COATED ORAL at 10:10

## 2017-10-13 RX ADMIN — GABAPENTIN 900 MG: 300 CAPSULE ORAL at 06:10

## 2017-10-13 RX ADMIN — PIPERACILLIN SODIUM AND TAZOBACTAM SODIUM 4.5 G: 4; .5 INJECTION, POWDER, LYOPHILIZED, FOR SOLUTION INTRAVENOUS at 10:10

## 2017-10-13 RX ADMIN — ALPRAZOLAM 0.5 MG: 0.25 TABLET ORAL at 12:10

## 2017-10-13 RX ADMIN — PIPERACILLIN SODIUM AND TAZOBACTAM SODIUM 4.5 G: 4; .5 INJECTION, POWDER, LYOPHILIZED, FOR SOLUTION INTRAVENOUS at 01:10

## 2017-10-13 RX ADMIN — GABAPENTIN 900 MG: 300 CAPSULE ORAL at 01:10

## 2017-10-13 RX ADMIN — ENOXAPARIN SODIUM 50 MG: 100 INJECTION SUBCUTANEOUS at 10:10

## 2017-10-13 RX ADMIN — VALACYCLOVIR HYDROCHLORIDE 500 MG: 500 TABLET, FILM COATED ORAL at 10:10

## 2017-10-13 RX ADMIN — EFAVIRENZ 600 MG: 600 TABLET, FILM COATED ORAL at 01:10

## 2017-10-13 RX ADMIN — EMTRICITABINE 200 MG: 200 CAPSULE ORAL at 01:10

## 2017-10-13 RX ADMIN — FLUOXETINE 40 MG: 20 CAPSULE ORAL at 10:10

## 2017-10-13 RX ADMIN — TOPIRAMATE 100 MG: 100 TABLET, FILM COATED ORAL at 01:10

## 2017-10-13 RX ADMIN — TENOFOVIR DISOPROXIL FUMARATE 300 MG: 300 TABLET, COATED ORAL at 09:10

## 2017-10-13 RX ADMIN — Medication 12.5 MG: at 08:10

## 2017-10-13 RX ADMIN — WARFARIN SODIUM 2.5 MG: 2.5 TABLET ORAL at 05:10

## 2017-10-13 RX ADMIN — PIPERACILLIN SODIUM AND TAZOBACTAM SODIUM 4.5 G: 4; .5 INJECTION, POWDER, LYOPHILIZED, FOR SOLUTION INTRAVENOUS at 05:10

## 2017-10-13 RX ADMIN — PANTOPRAZOLE SODIUM 40 MG: 40 TABLET, DELAYED RELEASE ORAL at 08:10

## 2017-10-13 RX ADMIN — FUROSEMIDE 20 MG: 10 INJECTION, SOLUTION INTRAVENOUS at 10:10

## 2017-10-13 RX ADMIN — VANCOMYCIN HYDROCHLORIDE 1000 MG: 1 INJECTION, POWDER, LYOPHILIZED, FOR SOLUTION INTRAVENOUS at 04:10

## 2017-10-13 NOTE — PLAN OF CARE
10/13/17 0717   Patient Assessment/Suction   Level of Consciousness (AVPU) alert   Respiratory Effort Normal;Unlabored   Expansion/Accessory Muscles/Retractions no retractions;no use of accessory muscles   All Lung Fields Breath Sounds diminished   Rhythm/Pattern, Respiratory depth regular;pattern regular;unlabored   Cough Frequency infrequent   Cough Type none   PRE-TX-O2-ETCO2   O2 Device (Oxygen Therapy) nasal cannula   $ Is the patient on Oxygen? Yes   Flow (L/min) 3   Oxygen Concentration (%) 32   SpO2 (!) 93 %   Pulse Oximetry Type Intermittent   $ Pulse Oximetry - Multiple Charge Pulse Oximetry - Multiple   Pulse 66   Resp 16   Aerosol Therapy   $ Aerosol Therapy Charges PRN treatment not required   Respiratory Treatment Status PRN treatment not required       Aerosol treatments PRN. Pt resting comfortably with no respiratory distress noted.

## 2017-10-13 NOTE — CONSULTS
"  [facility]  Adult Nutrition  Consult Note    SUMMARY     Recommendations  Recommendation/Intervention: 1.) Continue with Adult regular diet 2.) Consider appetite stimulant  Goals: 1.) patient will meet >80% of EEN within 96 hrs.   Nutrition Goal Status: new  Communication of RD Recs: other (comment) (care plan)    1. COPD exacerbation    2. Dyspnea, unspecified type    3. Acute hypoxemic respiratory failure    4. Coronary artery disease, angina presence unspecified, unspecified vessel or lesion type, unspecified whether native or transplanted heart    5. CHF (congestive heart failure)      Past Medical History:   Diagnosis Date    Arthritis     Asbestos exposure     COPD (chronic obstructive pulmonary disease)     O2 AT NITE PRN    Coronary artery disease     STENT X 1    Depression     HIV (human immunodeficiency virus infection)     Hypertension     NO MED NOW    Migraines     Myocardial infarction     Presence of dental bridge     UPPER AND LOWER    RLS (restless legs syndrome)     Wears glasses          Reason for Assessment  Reason for Assessment: physician consult  Interdisciplinary Rounds: attended  General Information Comments: Admits with wosening SOB x1 week. Patient laying in bed awake and alert. Patient depressed during Rd visit stating "I just want to go home and die". Severe muscle loss and moderate fat loss noted. Per intake on door,consumed ~70% of breakfast. Patient refusing all ONS with meals. Reports he has been eating poorly for months prior to intake. UBW ~150 lbs.       Nutrition Prescription Ordered  Current Diet Order: Adult regular diet      Evaluation of Received Nutrients/Fluid Intake  IV Fluid (mL): 1400  % Intake of Estimated Energy Needs: 25 - 50 %  % Meal Intake: 75%     Nutrition Risk Screen  Nutrition Risk Screen: no indicators present    Nutrition/Diet History  Food Preferences: no cultural or Mormonism food preferences noted. NKFA.   Factors " "Affecting Nutritional Intake: decreased appetite    Labs/Tests/Procedures/Meds  Diagnostic Test/Procedure Review: reviewed, pertinent  Pertinent Labs Reviewed: reviewed, pertinent  BMP  Lab Results   Component Value Date     10/13/2017    K 3.7 10/13/2017     10/13/2017    CO2 35 (H) 10/13/2017    BUN 14 10/13/2017    CREATININE 0.9 10/13/2017    CALCIUM 8.6 (L) 10/13/2017    ANIONGAP 6 (L) 10/13/2017    ESTGFRAFRICA >60 10/13/2017    EGFRNONAA >60 10/13/2017     Lab Results   Component Value Date    ALBUMIN 2.6 (L) 10/12/2017     Lab Results   Component Value Date    CALCIUM 8.6 (L) 10/13/2017    PHOS 2.5 (L) 10/12/2017     No results for input(s): POCTGLUCOSE in the last 24 hours.    Pertinent Medications Reviewed: reviewed  Scheduled Meds:   azithromycin  500 mg Oral Daily    ciprofloxacin (CIPRO)400mg/200ml D5W IVPB  400 mg Intravenous Q12H    efavirenz  600 mg Oral QHS    And    emtricitabine  200 mg Oral QHS    And    tenofovir  300 mg Oral QHS    enoxaparin  1 mg/kg Subcutaneous Q12H    fluoxetine  40 mg Oral Nightly    gabapentin  900 mg Oral TID    metoprolol tartrate  12.5 mg Oral BID    pantoprazole  40 mg Oral Daily    piperacillin-tazobactam 4.5 g in dextrose 5 % 100 mL IVPB (ready to mix system)  4.5 g Intravenous Q8H    topiramate  100 mg Oral QHS    valacyclovir  500 mg Oral BID    vancomycin (VANCOCIN) IVPB  1,000 mg Intravenous Q24H    warfarin  2.5 mg Oral Daily         Physical Findings  Overall Physical Appearance: loss of muscle mass, loss of subcutaneous fat, on oxygen therapy  Skin: intact (Karl score 18)    Anthropometrics  Temp: 97.4 °F (36.3 °C)  Height: 5' 10"  Weight Method: Bed Scale  Weight: 52.2 kg (115 lb 1.3 oz)  Ideal Body Weight (IBW), Male: 166 lb  % Ideal Body Weight, Male (lb): 69.33 lb  BMI (Calculated): 16.5  BMI Grade: 16 - 16.9 protein-energy malnutrition grade II  Usual Body Weight (UBW), k.1 kg  % Usual Body Weight: 76.81  % Weight Change " From Usual Weight: -23.35 %      Estimated/Assessed Needs  Weight Used For Calorie Calculations: 52.2 kg (115 lb 1.3 oz)   Energy Calorie Requirements (kcal): 1917  Energy Need Method: Cabins-St Jeor(x1.5)  RMR (Cabins-St. Jeor Equation): 1278.25  Weight Used For Protein Calculations: 52.2 kg (115 lb 1.3 oz)  1.2 gm Protein (gm): 62.77 and 1.5 gm Protein (gm): 78.46  Fluid Need Method: RDA Method (or per MD)  RDA Method (mL): 1917      Assessment and Plan    Severe malnutrition    Related to (etiology):   Decreased appetite     Signs and Symptoms (as evidenced by):   1.) EEN <75% >1 month   2.) 23% weight loss in 8 months   3.) Severe muscle loss: prominent protrusion of clavicle, scooping of temples  4.) Moderate fat loss: not ample supply in upper arm region    Interventions/Recommendations (treatment strategy):  Continue with diet, consider appetite stimulant     Nutrition Diagnosis Status:   New              Monitor and Evaluation  Food and Nutrient Intake: energy intake, food and beverage intake  Food and Nutrient Adminstration: diet order  Anthropometric Measurements: weight, weight change, body mass index  Biochemical Data, Medical Tests and Procedures: electrolyte and renal panel, glucose/endocrine profile, lipid profile  Nutrition-Focused Physical Findings: overall appearance    Nutrition Risk  Level of Risk:  (x2 weekly)    Nutrition Follow-Up  RD Follow-up?: Yes    Discharge Planning: discharge on adult regular diet

## 2017-10-13 NOTE — PLAN OF CARE
Problem: Nutrition, Imbalanced: Inadequate Oral Intake (Adult)  Goal: Identify Related Risk Factors and Signs and Symptoms  Related risk factors and signs and symptoms are identified upon initiation of Human Response Clinical Practice Guideline (CPG)  Outcome: Ongoing (interventions implemented as appropriate)  Recommendations  Recommendation/Intervention: 1.) Continue with Adult regular diet 2.) Consider appetite stimulant  Goals: 1.) patient will meet >80% of EEN within 96 hrs.   Nutrition Goal Status: new  Communication of RD Recs: other (comment) (care plan)

## 2017-10-13 NOTE — PLAN OF CARE
Problem: Patient Care Overview  Goal: Plan of Care Review  Outcome: Ongoing (interventions implemented as appropriate)  Patient AA&O , vs stable. O2- 3L per NC in use. PRN respiratory treatment given during night for c/o SOB , full relief obtained. IV antibiotics given as per orders . Remains free from falls/injury. Instructed to call for assistance as needed during night. Verbalized understanding. Call light in reach.

## 2017-10-13 NOTE — PT/OT/SLP PROGRESS
"Physical Therapy      Boni Lerner  MRN: [id number]    Patient not seen today secondary to  (PT eval attempted- Pt stated " not today and maybe tomorrow". sleepy, nurse Brian stated had Xanax earlier). Will follow-up 10-.    Kaelyn Bowen, PT    "

## 2017-10-13 NOTE — PROGRESS NOTES
Progress Note  Hospital Medicine  Patient Name:Boni Lerner  MRN:  [id number]  Patient Class: IP- Inpatient  Admit Date: 10/12/2017  Length of Stay: 1 days  Expected Discharge Date:   Attending Physician: Analilia Nolasco MD  Primary Care Provider:  Gilberto Rosas MD    SUBJECTIVE:     Principal Problem: Acute hypoxemic respiratory failure  Initial history of present illness:Mr. Lerner is a 70yo Wm with a PMH COPD, MI, HIV, and HTN. He presents to the hospital tonight with c/o worsening SOB and wheezing over 1 week. It is associated with chills, decreased appetite, and fatigue. He is not sure if he had fever or not. He is also having diarrhea and nausea without vomiting. He is on warfarin therapy, which he states that he has a blood clot in his heart, and his INR was found to be >10.0. Vitamin K was administered in the ED. He denies any bleeding. He currently denies any pain or discomforts.    PMH/PSH/SH/FH/Meds: reviewed.    Symptoms/Review of Systems: Patient reports continued shortness of breath and generalized weakness. No chest pain or headache, fever or abdominal pain.     Diet:  Adequate intake.    Activity level: Normal.    Pain:  Patient reports no pain.       OBJECTIVE:   Vital Signs (Most Recent):      Temp: 96.6 °F (35.9 °C) (10/12/17 2357)  Pulse: 88 (10/12/17 2357)  Resp: 20 (10/12/17 2357)  BP: 121/61 (10/12/17 2357)  SpO2: (!) 89 % (10/12/17 2357)       Vital Signs Range (Last 24H):  Temp:  [96.6 °F (35.9 °C)-97.7 °F (36.5 °C)]   Pulse:  [88-97]   Resp:  [16-24]   BP: (121-150)/(61-78)   SpO2:  [85 %-96 %]     Weight: 52.2 kg (115 lb)  Body mass index is 16.5 kg/m².    Intake/Output Summary (Last 24 hours) at 10/13/17 0141  Last data filed at 10/13/17 0124   Gross per 24 hour   Intake             1100 ml   Output             1100 ml   Net                0 ml     Physical Examination:  HENT:   Head: Normocephalic and atraumatic.   Eyes: Conjunctivae are normal. Pupils are equal, round, and reactive  to light.   Neck: Normal range of motion. Neck supple. No JVD present. No tracheal deviation present.   Cardiovascular: Normal rate, regular rhythm, normal heart sounds and intact distal pulses.    No murmur heard.  NSR on telemetry   Pulmonary/Chest:   Mildly tachypneic/ Decreased air movement throughout/ Wearing O2 via NC   Abdominal: Soft. Bowel sounds are normal. He exhibits no distension. There is no tenderness.   Musculoskeletal: Normal range of motion. He exhibits no edema.   Generalized weakness   Neurological: He is alert and oriented to person, place, and time. No cranial nerve deficit.   Skin: Skin is warm and dry. There is pallor.   Psychiatric: He has a normal mood and affect. His behavior is normal. Judgment and thought content normal.     CBC:    Recent Labs  Lab 10/12/17  1445   WBC 9.30   RBC 3.11*   HGB 9.9*   HCT 31.8*      *   MCH 31.7*   MCHC 31.0*   BMP    Recent Labs  Lab 10/12/17  1445         K 3.6      CO2 33*   BUN 14   CREATININE 0.9   CALCIUM 8.5*   MG 2.0      Diagnostic Results:  Microbiology Results (last 7 days)     Procedure Component Value Units Date/Time    Blood Culture #2 [[id number]] Collected:  10/12/17 1726    Order Status:  Sent Specimen:  Blood Updated:  10/12/17 2027    Blood Culture #1 [[id number]] Collected:  10/12/17 1715    Order Status:  Sent Specimen:  Blood Updated:  10/12/17 2027    Culture, Respiratory with Gram Stain [[id number]]     Order Status:  No result Specimen:  Respiratory            Chest X-Ray: Increase in bilateral infiltrates suggesting pulmonary edema.  Small left pleural effusion    Assessment/Plan:      Acute Hypoxic respiratory failure  Chronic obstructive pulmonary disease with acute exacerbation  O2 therapy  Nebulizers  Steroids  Continue IV antibiotics.  Monitor for an improvement in condition     Acute Systolic CHF exacerbation  Supplemental O2 via nasal canula; titrate O2 saturation to >92%.  Serial Cardiac  enzymes × 3.  Lasix 20 MG IV now. Monitor electrolytes for hypokalemia and hypomagnesemia.  Cardiology Consultation.  2-D Echocardiogram for evaluation of left ventricle systolic function or any valvular heart disease.  Daily weights.  Strict I/Os.  Fluid restriction.  Na restriction <2g/d.     Essential hypertension-Controlled  Chronic. Continue home BP medications and adjust as needed.     Tobacco abuse  Smoking cessation encouraged  Nicotine patch     HIV (human immunodeficiency virus infection)  Chronic. Continue home HIV medications.    VTE Risk Mitigation         Ordered     warfarin (COUMADIN) tablet 2.5 mg  Daily     Route:  Oral        10/13/17 0048     enoxaparin injection 50 mg  Every 12 hours (non-standard times)     Route:  Subcutaneous        10/12/17 1826        Analilia Nolasco MD  Department of Hospital Medicine   [facility]

## 2017-10-13 NOTE — CONSULTS
Boni Lerner [id number] is a 72 y.o. male who has been consulted for vancomycin dosing.    The patient has the following labs:     Date Creatinine (mg/dl)    BUN WBC Count   10/12/2017 Estimated Creatinine Clearance: 54.8 mL/min (based on SCr of 0.9 mg/dL). Lab Results   Component Value Date    BUN 14 10/12/2017     Lab Results   Component Value Date    WBC 9.30 10/12/2017        Current weight is 52.2 kg (115 lb)    The patient received 1000 mg on 10/12 at 1915 (if pt has already received first dose including doses in ED)    The patient will be started on vancomycin at a dose of 1000 mg every 24 hours (19 mg/kg/dose).  The vancomycin trough has been ordered for 10/14 at 1800.  Target trough goal is 15 to 20 mg/dL for pneumonia.    Patient will be followed by pharmacy for changes in renal function, toxicity, and efficacy.   Thank you for allowing us to participate in this patient's care.     Carlos Martinez, JohnD

## 2017-10-13 NOTE — PROGRESS NOTES
ZAIN Perez notified that patient was requesting home medication to be restarted. Order received to resume metoprolol, Atripla. Gabapentin, topamax & coumadin . Will continue to monitor.

## 2017-10-13 NOTE — PLAN OF CARE
Problem: Patient Care Overview  Goal: Plan of Care Review  Outcome: Ongoing (interventions implemented as appropriate)  Pt refused physical therapy x 2 today. IV abx infused per order. Receiving 4L o2 per NC . Rash noted @ L hip. Tolerating diet. Had an episode of shortness of breath, see previous note by nurse. Tele monitor in place. Bed locked and low. Call light in reach. Pt aware how to call for assistance. Will continue to monitor.

## 2017-10-13 NOTE — CONSULTS
Boni Lerner [id number] is a 72 y.o. male who has been consulted for vancomycin dosing.    The patient has the following labs:     Date Creatinine (mg/dl)    BUN WBC Count   10/13/2017 Estimated Creatinine Clearance: 54.8 mL/min (based on SCr of 0.9 mg/dL). Lab Results   Component Value Date    BUN 14 10/13/2017     Lab Results   Component Value Date    WBC 8.00 10/13/2017        Current weight is 52.2 kg (115 lb 1.3 oz)   Patient's current regimen of Vancomycin 1000 mg every 24 hours will be changed to Vancomycin 1000 mg every 18 hours until levels are completed. Trough is ordered  before the 3rd dose on 10/14/17 at 0830 (target trough 15 -20).    Patient will be followed by pharmacy for changes in renal function, toxicity, and efficacy.     Thank you for allowing us to participate in this patient's care.     Keira Ibrahim   10/13/2017

## 2017-10-13 NOTE — ASSESSMENT & PLAN NOTE
Related to (etiology):   Decreased appetite     Signs and Symptoms (as evidenced by):   1.) EEN <75% >1 month   2.) 23% weight loss in 8 months   3.) Severe muscle loss: prominent protrusion of clavicle, scooping of temples  4.) Moderate fat loss: not ample supply in upper arm region    Interventions/Recommendations (treatment strategy):  Continue with diet, consider appetite stimulant     Nutrition Diagnosis Status:   New

## 2017-10-13 NOTE — PROGRESS NOTES
Instructed to call Wero thurston( nephew) by patient to verify home medications. Message left for nephew to return call . dayshift nurse aware that message left with our contact number.

## 2017-10-13 NOTE — PT/OT/SLP PROGRESS
Physical Therapy      Boni Lerenr  MRN: [id number]    Patient not seen today secondary to  (PT adelso attempted- pt c/o nausea and SOB- stated could not participate right now, nurse Brian made aware). Will follow-up later today.    Kaelyn Bowen, PT

## 2017-10-13 NOTE — PLAN OF CARE
The pt lives at home with nephew and Burak SOLIS [phone]. The pt is active with [facility] and has a home cane, 3 in 1 commode, scooter, wheelchair and home O2 from [facility]. He uses [facility]'s pharmacy and his PCP is Dr. Rosas. He has [employer] and Medicaid insurance. I educated the pt on the blue discharge folder and wrote my name and number on the pts white board. Beena Upton LMSW     10/13/17 1013   Discharge Assessment   Assessment Type Discharge Planning Assessment   Confirmed/corrected address and phone number on facesheet? Yes   Assessment information obtained from? Patient;Medical Record   Communicated expected length of stay with patient/caregiver no   Prior to hospitilization cognitive status: Alert/Oriented   Prior to hospitalization functional status: Assistive Equipment   Current cognitive status: Alert/Oriented   Current Functional Status: Assistive Equipment   Lives With other relative(s)  (Nephmiranda Diaz [phone])   Able to Return to Prior Arrangements yes   Is patient able to care for self after discharge? Yes   Readmission Within The Last 30 Days no previous admission in last 30 days   Patient currently being followed by outpatient case management? No   Patient currently receives any other outside agency services? No   Equipment Currently Used at Home 3-in-1 commode;oxygen;cane, straight;power chair;wheelchair   Do you have any problems affording any of your prescribed medications? No  ([employer] and Medicaid )   Is the patient taking medications as prescribed? yes  ([facility] )   Does the patient have transportation home? Yes   Transportation Available family or friend will provide   Does the patient receive services at the [facility]? No   Discharge Plan A Home;Home Health   Discharge Plan B Home with family   Patient/Family In Agreement With Plan yes

## 2017-10-13 NOTE — PROGRESS NOTES
Informed by charge nurse that pt was complaining of being SOB. Pulse 70-80, oxygen sat 93% on 3L per NC . Pt states he uses 4L @ home, so nurse increased to 4L here. Pulled pt up and raised HOB to 45 degrees. Pt instructed to take deep breaths and relax. Pt stated he was feeling very anxious. Dr Nolasco notified, xanax 0.5 mg po bid prn order obtained and administered to pt. Pt now resting quietly in bed, no signs of distress noted. Pt stated he is feeling much better. Will continue to monitor.

## 2017-10-14 PROBLEM — R65.20 SEVERE SEPSIS: Status: ACTIVE | Noted: 2017-10-14

## 2017-10-14 PROBLEM — J96.02 ACUTE RESPIRATORY FAILURE WITH HYPOXIA AND HYPERCAPNIA: Status: ACTIVE | Noted: 2017-10-12

## 2017-10-14 PROBLEM — A41.9 SEVERE SEPSIS: Status: ACTIVE | Noted: 2017-10-14

## 2017-10-14 LAB
ALLENS TEST: ABNORMAL
ALLENS TEST: ABNORMAL
ANION GAP SERPL CALC-SCNC: 5 MMOL/L
BASOPHILS # BLD AUTO: 0.1 K/UL
BASOPHILS NFR BLD: 0.5 %
BILIRUB UR QL STRIP: NEGATIVE
BUN SERPL-MCNC: 11 MG/DL
CALCIUM SERPL-MCNC: 8.7 MG/DL
CHLORIDE SERPL-SCNC: 105 MMOL/L
CLARITY UR: CLEAR
CO2 SERPL-SCNC: 34 MMOL/L
COLOR UR: YELLOW
CREAT SERPL-MCNC: 1 MG/DL
DELSYS: ABNORMAL
DELSYS: ABNORMAL
DIASTOLIC DYSFUNCTION: YES
DIFFERENTIAL METHOD: ABNORMAL
EOSINOPHIL # BLD AUTO: 0.5 K/UL
EOSINOPHIL NFR BLD: 5.9 %
ERYTHROCYTE [DISTWIDTH] IN BLOOD BY AUTOMATED COUNT: 17.2 %
ERYTHROCYTE [SEDIMENTATION RATE] IN BLOOD BY WESTERGREN METHOD: 20 MM/H
EST. GFR  (AFRICAN AMERICAN): >60 ML/MIN/1.73 M^2
EST. GFR  (NON AFRICAN AMERICAN): >60 ML/MIN/1.73 M^2
FIO2: 36
FLOW: 15
FLOW: 4
GLUCOSE SERPL-MCNC: 109 MG/DL
GLUCOSE UR QL STRIP: NEGATIVE
HCO3 UR-SCNC: 35.4 MMOL/L (ref 24–28)
HCO3 UR-SCNC: 36 MMOL/L (ref 24–28)
HCT VFR BLD AUTO: 31.6 %
HGB BLD-MCNC: 9.9 G/DL
HGB UR QL STRIP: ABNORMAL
INR PPP: 1.5
KETONES UR QL STRIP: NEGATIVE
LACTATE SERPL-SCNC: 0.7 MMOL/L
LACTATE SERPL-SCNC: 1.4 MMOL/L
LEUKOCYTE ESTERASE UR QL STRIP: NEGATIVE
LYMPHOCYTES # BLD AUTO: 1.6 K/UL
LYMPHOCYTES NFR BLD: 17.7 %
MCH RBC QN AUTO: 31.7 PG
MCHC RBC AUTO-ENTMCNC: 31.4 G/DL
MCV RBC AUTO: 101 FL
MODE: ABNORMAL
MODE: ABNORMAL
MONOCYTES # BLD AUTO: 0.7 K/UL
MONOCYTES NFR BLD: 7.8 %
NEUTROPHILS # BLD AUTO: 6.3 K/UL
NEUTROPHILS NFR BLD: 68.1 %
NITRITE UR QL STRIP: NEGATIVE
PCO2 BLDA: 74 MMHG (ref 35–45)
PCO2 BLDA: 79.9 MMHG (ref 35–45)
PH SMN: 7.25 [PH] (ref 7.35–7.45)
PH SMN: 7.3 [PH] (ref 7.35–7.45)
PH UR STRIP: 6 [PH] (ref 5–8)
PLATELET # BLD AUTO: 164 K/UL
PMV BLD AUTO: 8.5 FL
PO2 BLDA: 109 MMHG (ref 80–100)
PO2 BLDA: 62 MMHG (ref 80–100)
POC BE: 10 MMOL/L
POC BE: 8 MMOL/L
POC SATURATED O2: 87 % (ref 95–100)
POC SATURATED O2: 97 % (ref 95–100)
POC TCO2: 38 MMOL/L (ref 23–27)
POC TCO2: 38 MMOL/L (ref 23–27)
POCT GLUCOSE: 157 MG/DL (ref 70–110)
POTASSIUM SERPL-SCNC: 3.8 MMOL/L
PROT UR QL STRIP: NEGATIVE
PROTHROMBIN TIME: 15.3 SEC
RBC # BLD AUTO: 3.13 M/UL
RETIRED EF AND QEF - SEE NOTES: 37 (ref 55–65)
SAMPLE: ABNORMAL
SAMPLE: ABNORMAL
SITE: ABNORMAL
SITE: ABNORMAL
SODIUM SERPL-SCNC: 144 MMOL/L
SP GR UR STRIP: 1.01 (ref 1–1.03)
SP02: 100
TROPONIN I SERPL DL<=0.01 NG/ML-MCNC: 0.02 NG/ML
URN SPEC COLLECT METH UR: ABNORMAL
UROBILINOGEN UR STRIP-ACNC: NEGATIVE EU/DL
VANCOMYCIN TROUGH SERPL-MCNC: 14.9 UG/ML
WBC # BLD AUTO: 9.3 K/UL

## 2017-10-14 PROCEDURE — 25000003 PHARM REV CODE 250: Performed by: HOSPITALIST

## 2017-10-14 PROCEDURE — 63600175 PHARM REV CODE 636 W HCPCS: Performed by: INTERNAL MEDICINE

## 2017-10-14 PROCEDURE — 25000242 PHARM REV CODE 250 ALT 637 W/ HCPCS: Performed by: HOSPITALIST

## 2017-10-14 PROCEDURE — 63600175 PHARM REV CODE 636 W HCPCS: Performed by: HOSPITALIST

## 2017-10-14 PROCEDURE — 82803 BLOOD GASES ANY COMBINATION: CPT

## 2017-10-14 PROCEDURE — 87106 FUNGI IDENTIFICATION YEAST: CPT

## 2017-10-14 PROCEDURE — 84484 ASSAY OF TROPONIN QUANT: CPT

## 2017-10-14 PROCEDURE — 80048 BASIC METABOLIC PNL TOTAL CA: CPT

## 2017-10-14 PROCEDURE — 85610 PROTHROMBIN TIME: CPT

## 2017-10-14 PROCEDURE — 25000003 PHARM REV CODE 250: Performed by: EMERGENCY MEDICINE

## 2017-10-14 PROCEDURE — 25000003 PHARM REV CODE 250: Performed by: NURSE PRACTITIONER

## 2017-10-14 PROCEDURE — 51702 INSERT TEMP BLADDER CATH: CPT

## 2017-10-14 PROCEDURE — 27000221 HC OXYGEN, UP TO 24 HOURS

## 2017-10-14 PROCEDURE — 27000190 HC CPAP FULL FACE MASK W/VALVE

## 2017-10-14 PROCEDURE — 27100240 HC SENSOR, NONINVASIVE CARDIAC OUTPUT

## 2017-10-14 PROCEDURE — 87086 URINE CULTURE/COLONY COUNT: CPT

## 2017-10-14 PROCEDURE — 99900035 HC TECH TIME PER 15 MIN (STAT)

## 2017-10-14 PROCEDURE — 25000003 PHARM REV CODE 250: Performed by: INTERNAL MEDICINE

## 2017-10-14 PROCEDURE — 36415 COLL VENOUS BLD VENIPUNCTURE: CPT

## 2017-10-14 PROCEDURE — 63600175 PHARM REV CODE 636 W HCPCS: Performed by: SPECIALIST

## 2017-10-14 PROCEDURE — 93005 ELECTROCARDIOGRAM TRACING: CPT

## 2017-10-14 PROCEDURE — 94640 AIRWAY INHALATION TREATMENT: CPT

## 2017-10-14 PROCEDURE — 83605 ASSAY OF LACTIC ACID: CPT

## 2017-10-14 PROCEDURE — 87040 BLOOD CULTURE FOR BACTERIA: CPT | Mod: 59

## 2017-10-14 PROCEDURE — 94660 CPAP INITIATION&MGMT: CPT

## 2017-10-14 PROCEDURE — 87070 CULTURE OTHR SPECIMN AEROBIC: CPT

## 2017-10-14 PROCEDURE — 81003 URINALYSIS AUTO W/O SCOPE: CPT

## 2017-10-14 PROCEDURE — 87205 SMEAR GRAM STAIN: CPT

## 2017-10-14 PROCEDURE — 36600 WITHDRAWAL OF ARTERIAL BLOOD: CPT

## 2017-10-14 PROCEDURE — 63600175 PHARM REV CODE 636 W HCPCS: Performed by: EMERGENCY MEDICINE

## 2017-10-14 PROCEDURE — 93010 ELECTROCARDIOGRAM REPORT: CPT | Mod: ,,, | Performed by: INTERNAL MEDICINE

## 2017-10-14 PROCEDURE — 85025 COMPLETE CBC W/AUTO DIFF WBC: CPT

## 2017-10-14 PROCEDURE — 94761 N-INVAS EAR/PLS OXIMETRY MLT: CPT

## 2017-10-14 PROCEDURE — 80202 ASSAY OF VANCOMYCIN: CPT

## 2017-10-14 PROCEDURE — 20000000 HC ICU ROOM

## 2017-10-14 RX ORDER — FUROSEMIDE 40 MG/1
40 TABLET ORAL DAILY
Status: DISCONTINUED | OUTPATIENT
Start: 2017-10-14 | End: 2017-10-14

## 2017-10-14 RX ORDER — IPRATROPIUM BROMIDE AND ALBUTEROL SULFATE 2.5; .5 MG/3ML; MG/3ML
3 SOLUTION RESPIRATORY (INHALATION) EVERY 6 HOURS
Status: DISCONTINUED | OUTPATIENT
Start: 2017-10-14 | End: 2017-10-16

## 2017-10-14 RX ORDER — FUROSEMIDE 10 MG/ML
20 INJECTION INTRAMUSCULAR; INTRAVENOUS ONCE
Status: DISCONTINUED | OUTPATIENT
Start: 2017-10-14 | End: 2017-10-19 | Stop reason: HOSPADM

## 2017-10-14 RX ORDER — ALPRAZOLAM 0.25 MG/1
0.25 TABLET ORAL 2 TIMES DAILY PRN
Status: DISCONTINUED | OUTPATIENT
Start: 2017-10-14 | End: 2017-10-19 | Stop reason: HOSPADM

## 2017-10-14 RX ORDER — MOXIFLOXACIN HYDROCHLORIDE 400 MG/1
400 TABLET ORAL DAILY
Status: DISCONTINUED | OUTPATIENT
Start: 2017-10-14 | End: 2017-10-14

## 2017-10-14 RX ORDER — HYDROCORTISONE 1 %
CREAM (GRAM) TOPICAL 2 TIMES DAILY
Status: DISCONTINUED | OUTPATIENT
Start: 2017-10-14 | End: 2017-10-19 | Stop reason: HOSPADM

## 2017-10-14 RX ORDER — DIAPER,BRIEF,INFANT-TODD,DISP
EACH MISCELLANEOUS 2 TIMES DAILY
Status: DISCONTINUED | OUTPATIENT
Start: 2017-10-14 | End: 2017-10-14

## 2017-10-14 RX ORDER — PREDNISONE 20 MG/1
60 TABLET ORAL DAILY
Status: DISCONTINUED | OUTPATIENT
Start: 2017-10-14 | End: 2017-10-18

## 2017-10-14 RX ORDER — ACETAMINOPHEN 500 MG
1000 TABLET ORAL EVERY 6 HOURS PRN
Status: DISCONTINUED | OUTPATIENT
Start: 2017-10-14 | End: 2017-10-19 | Stop reason: HOSPADM

## 2017-10-14 RX ORDER — RAMELTEON 8 MG/1
8 TABLET ORAL NIGHTLY PRN
Status: DISCONTINUED | OUTPATIENT
Start: 2017-10-14 | End: 2017-10-19 | Stop reason: HOSPADM

## 2017-10-14 RX ORDER — IPRATROPIUM BROMIDE AND ALBUTEROL SULFATE 2.5; .5 MG/3ML; MG/3ML
3 SOLUTION RESPIRATORY (INHALATION) EVERY 6 HOURS
Status: DISCONTINUED | OUTPATIENT
Start: 2017-10-14 | End: 2017-10-14

## 2017-10-14 RX ADMIN — AZITHROMYCIN 500 MG: 250 TABLET, FILM COATED ORAL at 03:10

## 2017-10-14 RX ADMIN — VALACYCLOVIR HYDROCHLORIDE 500 MG: 500 TABLET, FILM COATED ORAL at 08:10

## 2017-10-14 RX ADMIN — ENOXAPARIN SODIUM 50 MG: 100 INJECTION SUBCUTANEOUS at 08:10

## 2017-10-14 RX ADMIN — WARFARIN SODIUM 2.5 MG: 2.5 TABLET ORAL at 05:10

## 2017-10-14 RX ADMIN — PIPERACILLIN SODIUM AND TAZOBACTAM SODIUM 4.5 G: 4; .5 INJECTION, POWDER, LYOPHILIZED, FOR SOLUTION INTRAVENOUS at 09:10

## 2017-10-14 RX ADMIN — PIPERACILLIN AND TAZOBACTAM 4.5 G: 4; .5 INJECTION, POWDER, LYOPHILIZED, FOR SOLUTION INTRAVENOUS; PARENTERAL at 02:10

## 2017-10-14 RX ADMIN — Medication 12.5 MG: at 09:10

## 2017-10-14 RX ADMIN — TENOFOVIR DISOPROXIL FUMARATE 300 MG: 300 TABLET, COATED ORAL at 08:10

## 2017-10-14 RX ADMIN — ONDANSETRON 4 MG: 2 INJECTION INTRAMUSCULAR; INTRAVENOUS at 09:10

## 2017-10-14 RX ADMIN — EMTRICITABINE 200 MG: 200 CAPSULE ORAL at 08:10

## 2017-10-14 RX ADMIN — VANCOMYCIN HYDROCHLORIDE 1000 MG: 1 INJECTION, POWDER, LYOPHILIZED, FOR SOLUTION INTRAVENOUS at 10:10

## 2017-10-14 RX ADMIN — VALACYCLOVIR HYDROCHLORIDE 500 MG: 500 TABLET, FILM COATED ORAL at 09:10

## 2017-10-14 RX ADMIN — PREDNISONE 60 MG: 20 TABLET ORAL at 12:10

## 2017-10-14 RX ADMIN — ENOXAPARIN SODIUM 50 MG: 100 INJECTION SUBCUTANEOUS at 09:10

## 2017-10-14 RX ADMIN — MOXIFLOXACIN HYDROCHLORIDE 400 MG: 400 TABLET, FILM COATED ORAL at 12:10

## 2017-10-14 RX ADMIN — TOPIRAMATE 100 MG: 100 TABLET, FILM COATED ORAL at 08:10

## 2017-10-14 RX ADMIN — IPRATROPIUM BROMIDE AND ALBUTEROL SULFATE 3 ML: .5; 3 SOLUTION RESPIRATORY (INHALATION) at 11:10

## 2017-10-14 RX ADMIN — GABAPENTIN 900 MG: 300 CAPSULE ORAL at 09:10

## 2017-10-14 RX ADMIN — GABAPENTIN 900 MG: 300 CAPSULE ORAL at 03:10

## 2017-10-14 RX ADMIN — PANTOPRAZOLE SODIUM 40 MG: 40 TABLET, DELAYED RELEASE ORAL at 09:10

## 2017-10-14 RX ADMIN — CIPROFLOXACIN 400 MG: 2 INJECTION, SOLUTION INTRAVENOUS at 06:10

## 2017-10-14 RX ADMIN — IPRATROPIUM BROMIDE AND ALBUTEROL SULFATE 3 ML: .5; 3 SOLUTION RESPIRATORY (INHALATION) at 07:10

## 2017-10-14 RX ADMIN — EFAVIRENZ 600 MG: 600 TABLET, FILM COATED ORAL at 08:10

## 2017-10-14 RX ADMIN — FLUOXETINE 40 MG: 20 CAPSULE ORAL at 08:10

## 2017-10-14 RX ADMIN — GABAPENTIN 900 MG: 300 CAPSULE ORAL at 08:10

## 2017-10-14 RX ADMIN — HYDROCORTISONE: 1 CREAM TOPICAL at 08:10

## 2017-10-14 RX ADMIN — SODIUM CHLORIDE 1000 ML: 0.9 INJECTION, SOLUTION INTRAVENOUS at 01:10

## 2017-10-14 RX ADMIN — FUROSEMIDE 40 MG: 40 TABLET ORAL at 12:10

## 2017-10-14 RX ADMIN — PIPERACILLIN SODIUM AND TAZOBACTAM SODIUM 4.5 G: 4; .5 INJECTION, POWDER, LYOPHILIZED, FOR SOLUTION INTRAVENOUS at 04:10

## 2017-10-14 RX ADMIN — FUROSEMIDE 20 MG: 10 INJECTION, SOLUTION INTRAVENOUS at 09:10

## 2017-10-14 NOTE — PROGRESS NOTES
"Progress Note  [facility] Medicine    Admit Date: 10/12/2017    SUBJECTIVE:     Follow-up For:  Acute hypoxemic respiratory failure      Interval history (See H&P for complete P,F,SHx) : Patient seen and examined.  Overnight patient continued to have wheezing and shortness of breath.  This morning on examination patient appeared to be very winded just from going from a reclining position to a seated position O2 sats were checked after patient went from seated position with an O2 sat approximately 86-88% on 3 L oxygen nasal cannula. Patient later acutely decompensated- see sig event RRT note. After transfer to ICU, patient improved, but became hypotensive again, requiring 500cc more IVF, for total of 1.5 L. He was tried on HFNC< but repeat ABG showed continued resp acidosis. Patient was offered BIPAP and initially refused, but subsequently agreed after end of life discussion was had. Patient stated he wanted to be DNR and would not want intubation or CPR should he worsen.    Review of Systems:   Gen- Weakness/ Fatigue  Neuro- Confusion  Resp: Cough/ SOB    OBJECTIVE:     Vital Signs Range (Last 24H):  Temp:  [97.5 °F (36.4 °C)-98.2 °F (36.8 °C)]   Pulse:  []   Resp:  [13-33]   BP: ()/(48-82)   SpO2:  [84 %-100 %]     I & O (Last 24H):    Intake/Output Summary (Last 24 hours) at 10/14/17 4290  Last data filed at 10/14/17 1900   Gross per 24 hour   Intake                0 ml   Output             1310 ml   Net            -1310 ml       Estimated body mass index is 16.51 kg/m² as calculated from the following:    Height as of this encounter: 5' 10" (1.778 m).    Weight as of this encounter: 52.2 kg (115 lb 1.3 oz).    Physical Exam:  General- Patient thin frail  man is in respiratory distress  HEENT- PERRLA, EOMI, OP clear, MMM  Neck- No JVD, Lymphadenopathy, Thyromegaly  CV- Regular rate and rhythm, No Murmur/johnson/rubs  Resp- Lungs with diffuse wheezing and rhonchi with wet crackles " noted at the bilateral bases. Increased effort noted.  GI- Non tender/non-distended, BS normoactive x4 quads, no HSM  Extrem- No cyanosis, clubbing, edema. Pulses 2+ and symmetric  Neuro- Strength 5/5 flexors/extensors, DTRs 2+ and symmetric, Intact sensation to light touch grossly  Skin-  No rashes, masses or lesions noted    Laboratory/Diagnostic Data:  Reviewed and noted in plan where applicable- Please see chart for full lab data.    Medications:  Medication list was reviewed and changes noted under Assessment/Plan.    ASSESSMENT/PLAN:     Active Problems:    Active Hospital Problems    Diagnosis  POA    *Acute hypoxemic respiratory failure [J96.01]-   Patient's BIPAP settings, CXR and last ABG were reviewed.  Oxygen Concentration (%):  [15-50] 40  ABG    Recent Labs  Lab 10/14/17  1552   PH 7.255*   PO2 109*   PCO2 79.9*   HCO3 35.4*   BE 8     Will adjust vent management as follows- Continue BIPAP tonight and continue ABX and scheduled nebs/ Steroids. Assess ABG/CXR in AM.  Yes    COPD exacerbation [J44.1]-  Severe, uncontrolled. Tx per above.  Yes    HCAP (healthcare-associated pneumonia) [J18.9]-  Continue Broad spectrum ABX and assess/ monitor resp and blood cultures.  Yes    Acute systolic CHF (congestive heart failure) [I50.21]-   CHF currently controlled. Latest ECHO shows ejection fraction of   EF   Date Value Ref Range Status   10/13/2017 37 (A) 55 - 65    ]. Continue to hoold meds d/t hypotension and sepsis and monitor clinical status closely. Monitor on telemetry. Last BNP is   Recent Labs  Lab 10/12/17  1445   *   . Continue to stress to patient importance of self efficacy and  on diet for CHF.  Yes    Severe malnutrition [E43]- Nutrition consulted. Body mass index is 16.51 kg/m².. Encourage maximal PO intake. Diet supplementation ordered per nutrition approval. Will encourage PO and monitor closely for weight changes. NPO for now d/t sepsis/  Yes    Long term anticoagulation-   Potentially d/t intracardiac thrombus. Currently on coumadin/lovenox bridge. Monitor INR and wean off if >2. ECHO reviewed- no more thrombus identified by TTE.  Yes    Rash-  On R hip. Ordered steroid cream.  Yes    HIV (human immunodeficiency virus infection) [B20]- Chronic problem, controlled. Will continue chronic medication(s), HAART and monitor for any changes, adjusting as needed.   Yes      Resolved Hospital Problems    Diagnosis Date Resolved POA   No resolved problems to display.     Disposition- Unsure, in ICU.    VTE Risk Mitigation         Ordered     warfarin (COUMADIN) tablet 2.5 mg  Daily     Route:  Oral        10/13/17 0048     enoxaparin injection 50 mg  Every 12 hours (non-standard times)     Route:  Subcutaneous        10/12/17 1826          Critical care time spent on the evaluation and treatment of severe organ dysfunction, review of pertinent labs and imaging studies, discussions with consulting providers and discussions with patient/family 39 minutes    I spent 28 minutes of face to face discussion regarding advance directives and end of life planning which included patient and family. Patient/Family understands the seriousness of their condition and would like to make their end of life decisions known as follows- DNR/DNI.

## 2017-10-14 NOTE — PROGRESS NOTES
S/p RRT for change in VS- reported that manual bp check found to be 60s/ 40s. Pt with eyes open and speaking to nurses on arrival into room- speech somewhat slurred and difficult to understand. Dr Lopez in attendance with staff nurses at bedside. Noted to have saline bolus infusing to left posterior forearm IV site- NIKKI Crandall RN, Admin Rep in process of starting 2nd IV site also to left posterior forearm. Noted to have loose, congested sounding cough intermittently but pt unable to produce any sputum during RRT. Pt urgently transferred to ICU room 507 via bed with RNs in attendance- pt on cardiac monitor and portable O2. Bipap available as ordered at bedside- waiting for respiratory therapist to place pt on bipap. 2nd bolus started to new left posterior forearm piv. Ortega catheter inserted with return of clear colorless urine- pt received lasix earlier today while on MS3. Pt more responsive and joking with ICU staff after arrival into ICU.  Noted to be in sinus temitope without ectopy at present.

## 2017-10-14 NOTE — PT/OT/SLP PROGRESS
Physical Therapy      Boni Lerner  MRN: [id number]    Patient not seen today secondary to tx to ICU. Will follow-up pending new orders.    Julia Morales, PT

## 2017-10-14 NOTE — CONSULTS
Boni Lerner [id number] is a 72 y.o. male who had been consulted for vancomycin dosing.    Vancomycin has been discontinued.  Pharmacy consult for vancomycin dosing in no longer required.      Thank you for allowing us to participate in this patient's care.     Keira Ibrahim     10/14/2017

## 2017-10-14 NOTE — CONSULTS
Boni Lerner [id number] is a 72 y.o. male who has been consulted for vancomycin dosing.    The patient has the following labs:     Date Creatinine (mg/dl)    WBC Count   10/14/2017 Estimated Creatinine Clearance: 49.3 mL/min (based on SCr of 1 mg/dL). Lab Results   Component Value Date    WBC 9.30 10/14/2017        Current weight is 52.2 kg (115 lb 1.3 oz)    Vancomycin consult was restarted.   Target trough range is 15-20 mg/dL.  . Pharmacy will continue previous dose of 1000mg q18h.  The vancomycin trough has been ordered for 10/15/17 at 2200.     Patient will be followed by pharmacy for changes in renal function, toxicity, and efficacy.    Thank you for allowing us to participate in this patient's care.     Neto Martines, Pharmacist

## 2017-10-14 NOTE — CONSULTS
Boni Lerner [id number] is a 72 y.o. male who has been consulted for vancomycin dosing.    The patient has the following labs:     Date Creatinine (mg/dl)    BUN WBC Count   10/14/2017 Estimated Creatinine Clearance: 49.3 mL/min (based on SCr of 1 mg/dL). Lab Results   Component Value Date    BUN 11 10/14/2017     Lab Results   Component Value Date    WBC 9.30 10/14/2017        Current weight is 52.2 kg (115 lb 1.3 oz)    Pt is receiving vancomycin 1000 mg every 18 hours.  Vancomycin trough from 10/14/17 at 0515 was 14.9 mg/dL.  Target trough range is 15 -20 mg/dL.   Trough was drawn 3 hours early and anticipate it is subtherapeutic. Patient will be continued on current regimen and Vancomycin trough has been ordered prior to 3rd dose on 10/15/17 at 2030.      Patient will be followed by pharmacy for changes in renal function, toxicity, and efficacy.  Thank you for allowing us to participate in this patient's care.     Keira Ibrahim    10/14/2017

## 2017-10-14 NOTE — SIGNIFICANT EVENT
10/14/17 1306   Patient Assessment/Suction   Level of Consciousness (AVPU) alert   Labs   $ Was an ABG obtained? Arterial Puncture;ISTAT - Blood gas   $ Labs Tech Time 15 min   Critical Value Communication   Notified Physician/Designee Dr Lopez   Date Result Received 10/14/17   Time Result Received 1310   Resulting Department of Critical Value Resp   Who communicated critical value from resulting department? KATHY mitchell   Critical Test #1 pc02   Critical Test #1 Result 74.0   Critical Test #5 Result    Date Notified 10/14/17   Time Notified 1310   Read Back Verification Yes   Physician Directive transfer to ICU to be placed on bipap

## 2017-10-14 NOTE — PROGRESS NOTES
Progress Note  Cardiology    Admit Date: 10/12/2017   LOS: 1 day     Follow-up For:  CHF; SOB; COPD    Scheduled Meds:   albuterol-ipratropium 2.5mg-0.5mg/3mL  3 mL Nebulization Q6H    efavirenz  600 mg Oral QHS    And    emtricitabine  200 mg Oral QHS    And    tenofovir  300 mg Oral QHS    enoxaparin  1 mg/kg Subcutaneous Q12H    fluoxetine  40 mg Oral Nightly    furosemide  40 mg Oral Daily    gabapentin  900 mg Oral TID    hydrocortisone   Topical BID    metoprolol tartrate  12.5 mg Oral BID    pantoprazole  40 mg Oral Daily    piperacillin-tazobactam 4.5 g in dextrose 5 % 100 mL IVPB (ready to mix system)  4.5 g Intravenous Q8H    predniSONE  60 mg Oral Daily    topiramate  100 mg Oral QHS    valacyclovir  500 mg Oral BID    [START ON 10/15/2017] vancomycin (VANCOCIN) IVPB  1,000 mg Intravenous Q18H    warfarin  2.5 mg Oral Daily     Continuous Infusions:   PRN Meds:acetaminophen, alprazolam, morphine, ondansetron, ramelteon, senna-docusate 8.6-50 mg    Review of patient's allergies indicates:   Allergen Reactions    Unable to assess Other (See Comments)     EVELINIAN (DRUG FOR HIV) TAKEN 1989 ALMOST PARALYZED LEGS    Aztreonam Nausea And Vomiting       SUBJECTIVE:     Interval History: Patient had sob on floor and transferred to the ICU.15L O2 but CO2 retention ; on bi PAPA now ; appears comfortable.    Review of Systems  Respiratory: positive for cough, negative for hemoptysis, sputum and wheezing  Cardiovascular: negative for chest pain    OBJECTIVE:     Vital Signs (Most Recent)  Temp: 97.7 °F (36.5 °C) (10/14/17 1330)  Pulse: 65 (10/14/17 1726)  Resp: (!) 26 (10/14/17 1726)  BP: 122/73 (10/14/17 1552)  SpO2: 97 % (10/14/17 1726)    Vital Signs Range (Last 24H):  Temp:  [97 °F (36.1 °C)-98.2 °F (36.8 °C)]   Pulse:  []   Resp:  [14-31]   BP: ()/(59-88)   SpO2:  [87 %-100 %]       Physical Exam:  Neck: no carotid bruit, no JVD and supple, symmetrical, trachea midline  Lungs: clear  to auscultation bilaterally, normal respiratory effort  Heart: regular rate and rhythm, S1, S2 normal, no murmur, click, rub or gallop  Abdomen: soft, non-tender; bowel sounds normal; no masses,  no organomegaly  Extremities: Extremities normal, atraumatic, no cyanosis, clubbing, or edema    Recent Results (from the past 24 hour(s))   CBC auto differential    Collection Time: 10/14/17  5:15 AM   Result Value Ref Range    WBC 9.30 3.90 - 12.70 K/uL    RBC 3.13 (L) 4.60 - 6.20 M/uL    Hemoglobin 9.9 (L) 14.0 - 18.0 g/dL    Hematocrit 31.6 (L) 40.0 - 54.0 %     (H) 82 - 98 fL    MCH 31.7 (H) 27.0 - 31.0 pg    MCHC 31.4 (L) 32.0 - 36.0 g/dL    RDW 17.2 (H) 11.5 - 14.5 %    Platelets 164 150 - 350 K/uL    MPV 8.5 (L) 9.2 - 12.9 fL    Gran # 6.3 1.8 - 7.7 K/uL    Lymph # 1.6 1.0 - 4.8 K/uL    Mono # 0.7 0.3 - 1.0 K/uL    Eos # 0.5 0.0 - 0.5 K/uL    Baso # 0.10 0.00 - 0.20 K/uL    Gran% 68.1 38.0 - 73.0 %    Lymph% 17.7 (L) 18.0 - 48.0 %    Mono% 7.8 4.0 - 15.0 %    Eosinophil% 5.9 0.0 - 8.0 %    Basophil% 0.5 0.0 - 1.9 %    Differential Method Automated    Basic metabolic panel    Collection Time: 10/14/17  5:15 AM   Result Value Ref Range    Sodium 144 136 - 145 mmol/L    Potassium 3.8 3.5 - 5.1 mmol/L    Chloride 105 95 - 110 mmol/L    CO2 34 (H) 23 - 29 mmol/L    Glucose 109 70 - 110 mg/dL    BUN, Bld 11 8 - 23 mg/dL    Creatinine 1.0 0.5 - 1.4 mg/dL    Calcium 8.7 8.7 - 10.5 mg/dL    Anion Gap 5 (L) 8 - 16 mmol/L    eGFR if African American >60 >60 mL/min/1.73 m^2    eGFR if non African American >60 >60 mL/min/1.73 m^2   Protime-INR    Collection Time: 10/14/17  5:15 AM   Result Value Ref Range    Prothrombin Time 15.3 (H) 9.0 - 12.5 sec    INR 1.5 (H) 0.8 - 1.2   VANCOMYCIN, TROUGH before 3rd dose    Collection Time: 10/14/17  5:15 AM   Result Value Ref Range    Vancomycin-Trough 14.9 10.0 - 22.0 ug/mL   Troponin I    Collection Time: 10/14/17  5:15 AM   Result Value Ref Range    Troponin I 0.017 0.000 - 0.026  ng/mL   POCT glucose    Collection Time: 10/14/17 12:48 PM   Result Value Ref Range    POCT Glucose 157 (H) 70 - 110 mg/dL   ISTAT PROCEDURE    Collection Time: 10/14/17  1:06 PM   Result Value Ref Range    POC PH 7.296 (L) 7.35 - 7.45    POC PCO2 74.0 (HH) 35 - 45 mmHg    POC PO2 62 (L) 80 - 100 mmHg    POC HCO3 36.0 (H) 24 - 28 mmol/L    POC BE 10 -2 to 2 mmol/L    POC SATURATED O2 87 (L) 95 - 100 %    POC TCO2 38 (H) 23 - 27 mmol/L    Rate 20     Sample ARTERIAL     Site RR     Allens Test Pass     DelSys Room Air     Mode SPONT     Flow 4     FiO2 36    Lactic acid, plasma    Collection Time: 10/14/17  1:38 PM   Result Value Ref Range    Lactate (Lactic Acid) 1.4 0.5 - 2.2 mmol/L   Urinalysis    Collection Time: 10/14/17  2:49 PM   Result Value Ref Range    Specimen UA Urine, Catheterized     Color, UA Yellow Yellow, Straw, Radha    Appearance, UA Clear Clear    pH, UA 6.0 5.0 - 8.0    Specific Gravity, UA 1.010 1.005 - 1.030    Protein, UA Negative Negative    Glucose, UA Negative Negative    Ketones, UA Negative Negative    Bilirubin (UA) Negative Negative    Occult Blood UA Trace (A) Negative    Nitrite, UA Negative Negative    Urobilinogen, UA Negative <2.0 EU/dL    Leukocytes, UA Negative Negative   ISTAT PROCEDURE    Collection Time: 10/14/17  3:52 PM   Result Value Ref Range    POC PH 7.255 (LL) 7.35 - 7.45    POC PCO2 79.9 (HH) 35 - 45 mmHg    POC PO2 109 (H) 80 - 100 mmHg    POC HCO3 35.4 (H) 24 - 28 mmol/L    POC BE 8 -2 to 2 mmol/L    POC SATURATED O2 97 95 - 100 %    POC TCO2 38 (H) 23 - 27 mmol/L    Sample ARTERIAL     Site RR     Allens Test Pass     DelSys Nasal Can     Mode SPONT     Flow 15     Sp02 100    Lactic acid, plasma    Collection Time: 10/14/17  4:56 PM   Result Value Ref Range    Lactate (Lactic Acid) 0.7 0.5 - 2.2 mmol/L       Diagnostic Results:  Labs: Reviewed  ECG: Reviewed    ASSESSMENT/PLAN:     Lasix 20 mg  iv.

## 2017-10-14 NOTE — PLAN OF CARE
Aerosol tx q6, left cup at bedside for sputum collection     10/14/17 1110   Patient Assessment/Suction   Level of Consciousness (AVPU) alert   Respiratory Effort Unlabored   Expansion/Accessory Muscles/Retractions no use of accessory muscles   All Lung Fields Breath Sounds diminished   FARHAT Breath Sounds diminished   LLL Breath Sounds diminished   RUL Breath Sounds diminished   RML Breath Sounds diminished   RLL Breath Sounds diminished   PRE-TX-O2-ETCO2   O2 Device (Oxygen Therapy) nasal cannula   $ Is the patient on Oxygen? Yes   Flow (L/min) 4   Oxygen Concentration (%) 36   SpO2 100 %   Pulse Oximetry Type Intermittent   $ Pulse Oximetry - Multiple Charge Pulse Oximetry - Multiple   Pulse 67   Resp 14   Aerosol Therapy   $ Aerosol Therapy Charges Aerosol Treatment   Respiratory Treatment Status given   SVN/Inhaler Treatment Route mask   Position During Treatment HOB at 30 degrees   Patient Tolerance good   Post-Treatment   Post-treatment Heart Rate (beats/min) 66   Post-treatment Resp Rate (breaths/min) 14   All Fields Breath Sounds diminished;aeration increased   Ready to Wean/Extubation Screen   FIO2<=50 (chart decimal) 0.36

## 2017-10-14 NOTE — SIGNIFICANT EVENT
Patient underwent rapid response from floor with noted hypoxemia and hypotension.  Blood pressure was noted to be 67/45 and patient was lethargic-O2 sats were 85% on 4 L of oxygen.  Patient underwent rapid bolus of 500 cc of normal saline, IV access was established and patient was transitioned swiftly to the intensive care unit.  In the ICU, the patient received sepsis doses of Zosyn and vancomycin.  He finished 1 L bolus out and blood pressure returned back to the 120s over 80s and O2 sats were titrated up to 15 L high flow nasal cannula with responding oxygen saturations of 100%.  ABG was done reviewed by myself which revealed respiratory acidosis with secondary hypoxemia.  EKG was also done and read and interpreted by myself, without significant changes from previous EKG done earlier this year.  Patient was reexamined after rapid response done in transfer.  He was mentating normally, and oxygenation and vital signs had since stabilized

## 2017-10-14 NOTE — CONSULTS
DATE OF CONSULTATION: 10/13/2017.     HISTORY OF PRESENT ILLNESS:  This 72-year-old patient was admitted with symptoms   of increasing shortness of breath and weakness for about a week or so.  This   was recently admitted to [facility] for the same complaints and was treated for   pneumonia.  He did not check his temperature.  He did have some diarrhea and   nausea.    PAST MEDICAL HISTORY:  The patient has a history of myocardial infarction and   coronary stent placement x1, left ventricular dysfunction and ejection fraction   of 35-40% on previous echo.  Thrombus in left ventricular apex for which he has   been on Coumadin.  COPD, on home oxygen, HIV, hypertension.    FAMILY HISTORY:  Positive for COPD and cancer.    SOCIAL HISTORY:  The patient continues to smoke.  He does not drink.    ALLERGIES:  Aztreonam.    PHYSICAL EXAMINATION:  GENERAL:  Shows an emaciated gentleman in no acute distress.  VITAL SIGNS:  Temperature 98, pulse 70 per minute and regular, blood pressure   120/80.  HEENT:  Normocephalic.  Sclerae nonicteric.  NECK:  Supple.  No jugular venous distention.  Carotids 1+ without bruit.  CARDIAC:  Regular rhythm.  No gallop or rub.  LUNGS:  With bilateral coarse breath sounds with some crackles.  ABDOMEN:  Soft, nontender.  Liver edge palpable below costal margin, 2 to 3   fingers.  Bowel sounds are present.  EXTREMITIES:  No pitting edema of feet.    LABORATORY DATA:  EKG shows sinus rhythm with changes of anterolateral wall   myocardial infarction age indeterminate, probably old left axis deviation and   nonspecific ST-T abnormality.    Chest x-ray shows findings of pulmonary congestion and edema.    LABORATORY:  WBC 8.0, hemoglobin 9.6, hematocrit 30.0 and platelet count is 171,   PT 10.8, INR 1.0, sodium 134, potassium 3.7, chloride 103, CO2 35, BUN 14,   creatinine 0.9, albumin 2.6 and .    IMPRESSION:  1.  Exacerbation of chronic obstructive pulmonary disease.  2.  Congestive heart failure with  left ventricular dysfunction.  3.  Coronary artery disease, status post remote myocardial infarction, LV apical   thrombus by history.  4.  Hypertension.  5.  HIV status.    We will start the patient on low-dose diuretics.  His echocardiogram report   suggests left ventricular dysfunction with EF of 35-40%.  Further   recommendations will depend on clinical course.  The patient needs to be on   anticoagulation.      SA/IN  dd: 10/13/2017 20:01:11 (CDT)  td: 10/13/2017 21:33:49 (CDT)  Doc ID   #[id number]  Job ID #[id number]    CC:

## 2017-10-14 NOTE — PLAN OF CARE
10/1945   Patient Assessment/Suction   Level of Consciousness (AVPU) alert   Respiratory Effort Normal;Unlabored   Expansion/Accessory Muscles/Retractions no use of accessory muscles   FARHAT Breath Sounds clear   LLL Breath Sounds crackles fine   RUL Breath Sounds clear   RML Breath Sounds clear   RLL Breath Sounds clear   PRE-TX-O2-ETCO2   O2 Device (Oxygen Therapy) nasal cannula   $ Is the patient on Oxygen? Yes   Flow (L/min) 4  (decreased flow to 3L)   SpO2 98 %   Pulse Oximetry Type Intermittent   $ Pulse Oximetry - Multiple Charge Pulse Oximetry - Multiple   Pulse 90   Resp 20   Aerosol Therapy   $ Aerosol Therapy Charges PRN treatment not required   Pt assessed for PRN neb tx, none needed at this time.

## 2017-10-15 LAB
ALBUMIN SERPL BCP-MCNC: 2.5 G/DL
ALLENS TEST: ABNORMAL
ALP SERPL-CCNC: 90 U/L
ALT SERPL W/O P-5'-P-CCNC: 5 U/L
ANION GAP SERPL CALC-SCNC: 9 MMOL/L
AST SERPL-CCNC: 12 U/L
BASOPHILS # BLD AUTO: 0 K/UL
BASOPHILS NFR BLD: 0.6 %
BILIRUB SERPL-MCNC: 0.3 MG/DL
BNP SERPL-MCNC: 268 PG/ML
BUN SERPL-MCNC: 12 MG/DL
CALCIUM SERPL-MCNC: 8.2 MG/DL
CHLORIDE SERPL-SCNC: 99 MMOL/L
CO2 SERPL-SCNC: 34 MMOL/L
CREAT SERPL-MCNC: 1 MG/DL
CRP SERPL-MCNC: 31.7 MG/L
DELSYS: ABNORMAL
DIFFERENTIAL METHOD: ABNORMAL
EOSINOPHIL # BLD AUTO: 0 K/UL
EOSINOPHIL NFR BLD: 0.5 %
ERYTHROCYTE [DISTWIDTH] IN BLOOD BY AUTOMATED COUNT: 17 %
EST. GFR  (AFRICAN AMERICAN): >60 ML/MIN/1.73 M^2
EST. GFR  (NON AFRICAN AMERICAN): >60 ML/MIN/1.73 M^2
GLUCOSE SERPL-MCNC: 95 MG/DL
HCO3 UR-SCNC: 34.9 MMOL/L (ref 24–28)
HCT VFR BLD AUTO: 29.3 %
HGB BLD-MCNC: 9.3 G/DL
INR PPP: 3
LYMPHOCYTES # BLD AUTO: 1.5 K/UL
LYMPHOCYTES NFR BLD: 23.4 %
MAGNESIUM SERPL-MCNC: 1.5 MG/DL
MCH RBC QN AUTO: 31.9 PG
MCHC RBC AUTO-ENTMCNC: 31.8 G/DL
MCV RBC AUTO: 101 FL
MONOCYTES # BLD AUTO: 0.5 K/UL
MONOCYTES NFR BLD: 7.1 %
NEUTROPHILS # BLD AUTO: 4.4 K/UL
NEUTROPHILS NFR BLD: 68.4 %
PCO2 BLDA: 65.7 MMHG (ref 35–45)
PH SMN: 7.33 [PH] (ref 7.35–7.45)
PHOSPHATE SERPL-MCNC: 2.7 MG/DL
PLATELET # BLD AUTO: 151 K/UL
PMV BLD AUTO: 8.3 FL
PO2 BLDA: 61 MMHG (ref 80–100)
POC BE: 9 MMOL/L
POC SATURATED O2: 88 % (ref 95–100)
POC TCO2: 37 MMOL/L (ref 23–27)
POTASSIUM SERPL-SCNC: 3.1 MMOL/L
PROT SERPL-MCNC: 5.9 G/DL
PROTHROMBIN TIME: 30.6 SEC
RBC # BLD AUTO: 2.91 M/UL
SAMPLE: ABNORMAL
SITE: ABNORMAL
SODIUM SERPL-SCNC: 142 MMOL/L
VANCOMYCIN TROUGH SERPL-MCNC: 17 UG/ML
WBC # BLD AUTO: 6.4 K/UL

## 2017-10-15 PROCEDURE — 25000003 PHARM REV CODE 250: Performed by: NURSE PRACTITIONER

## 2017-10-15 PROCEDURE — 63600175 PHARM REV CODE 636 W HCPCS: Performed by: HOSPITALIST

## 2017-10-15 PROCEDURE — 36600 WITHDRAWAL OF ARTERIAL BLOOD: CPT

## 2017-10-15 PROCEDURE — 83735 ASSAY OF MAGNESIUM: CPT

## 2017-10-15 PROCEDURE — 94761 N-INVAS EAR/PLS OXIMETRY MLT: CPT

## 2017-10-15 PROCEDURE — 27000221 HC OXYGEN, UP TO 24 HOURS

## 2017-10-15 PROCEDURE — 86140 C-REACTIVE PROTEIN: CPT

## 2017-10-15 PROCEDURE — 63600175 PHARM REV CODE 636 W HCPCS: Performed by: INTERNAL MEDICINE

## 2017-10-15 PROCEDURE — 25000003 PHARM REV CODE 250: Performed by: EMERGENCY MEDICINE

## 2017-10-15 PROCEDURE — 84100 ASSAY OF PHOSPHORUS: CPT

## 2017-10-15 PROCEDURE — 99900035 HC TECH TIME PER 15 MIN (STAT)

## 2017-10-15 PROCEDURE — 85025 COMPLETE CBC W/AUTO DIFF WBC: CPT

## 2017-10-15 PROCEDURE — 80053 COMPREHEN METABOLIC PANEL: CPT

## 2017-10-15 PROCEDURE — 63600175 PHARM REV CODE 636 W HCPCS: Performed by: EMERGENCY MEDICINE

## 2017-10-15 PROCEDURE — 82803 BLOOD GASES ANY COMBINATION: CPT

## 2017-10-15 PROCEDURE — 85610 PROTHROMBIN TIME: CPT

## 2017-10-15 PROCEDURE — 94640 AIRWAY INHALATION TREATMENT: CPT

## 2017-10-15 PROCEDURE — 25000003 PHARM REV CODE 250: Performed by: INTERNAL MEDICINE

## 2017-10-15 PROCEDURE — 80202 ASSAY OF VANCOMYCIN: CPT

## 2017-10-15 PROCEDURE — 25000242 PHARM REV CODE 250 ALT 637 W/ HCPCS: Performed by: HOSPITALIST

## 2017-10-15 PROCEDURE — 25000003 PHARM REV CODE 250: Performed by: HOSPITALIST

## 2017-10-15 PROCEDURE — 20000000 HC ICU ROOM

## 2017-10-15 PROCEDURE — 94660 CPAP INITIATION&MGMT: CPT

## 2017-10-15 PROCEDURE — 83880 ASSAY OF NATRIURETIC PEPTIDE: CPT

## 2017-10-15 PROCEDURE — 36415 COLL VENOUS BLD VENIPUNCTURE: CPT

## 2017-10-15 RX ORDER — POTASSIUM CHLORIDE 20 MEQ/15ML
40 SOLUTION ORAL
Status: DISCONTINUED | OUTPATIENT
Start: 2017-10-15 | End: 2017-10-19 | Stop reason: HOSPADM

## 2017-10-15 RX ORDER — LANOLIN ALCOHOL/MO/W.PET/CERES
800 CREAM (GRAM) TOPICAL
Status: DISCONTINUED | OUTPATIENT
Start: 2017-10-15 | End: 2017-10-19 | Stop reason: HOSPADM

## 2017-10-15 RX ORDER — LOPERAMIDE HYDROCHLORIDE 2 MG/1
2 CAPSULE ORAL 4 TIMES DAILY PRN
Status: DISCONTINUED | OUTPATIENT
Start: 2017-10-15 | End: 2017-10-19 | Stop reason: HOSPADM

## 2017-10-15 RX ORDER — POTASSIUM CHLORIDE 20 MEQ/15ML
60 SOLUTION ORAL
Status: DISCONTINUED | OUTPATIENT
Start: 2017-10-15 | End: 2017-10-19 | Stop reason: HOSPADM

## 2017-10-15 RX ORDER — FUROSEMIDE 20 MG/1
20 TABLET ORAL DAILY
Status: DISCONTINUED | OUTPATIENT
Start: 2017-10-16 | End: 2017-10-19 | Stop reason: HOSPADM

## 2017-10-15 RX ADMIN — PANTOPRAZOLE SODIUM 40 MG: 40 TABLET, DELAYED RELEASE ORAL at 08:10

## 2017-10-15 RX ADMIN — VALACYCLOVIR HYDROCHLORIDE 500 MG: 500 TABLET, FILM COATED ORAL at 08:10

## 2017-10-15 RX ADMIN — POTASSIUM CHLORIDE 40 MEQ: 20 SOLUTION ORAL at 11:10

## 2017-10-15 RX ADMIN — TENOFOVIR DISOPROXIL FUMARATE 300 MG: 300 TABLET, COATED ORAL at 09:10

## 2017-10-15 RX ADMIN — FLUOXETINE 40 MG: 20 CAPSULE ORAL at 09:10

## 2017-10-15 RX ADMIN — GABAPENTIN 900 MG: 300 CAPSULE ORAL at 05:10

## 2017-10-15 RX ADMIN — VALACYCLOVIR HYDROCHLORIDE 500 MG: 500 TABLET, FILM COATED ORAL at 09:10

## 2017-10-15 RX ADMIN — EMTRICITABINE 200 MG: 200 CAPSULE ORAL at 09:10

## 2017-10-15 RX ADMIN — TOPIRAMATE 100 MG: 100 TABLET, FILM COATED ORAL at 09:10

## 2017-10-15 RX ADMIN — GABAPENTIN 900 MG: 300 CAPSULE ORAL at 03:10

## 2017-10-15 RX ADMIN — ALPRAZOLAM 0.25 MG: 0.25 TABLET ORAL at 11:10

## 2017-10-15 RX ADMIN — HYDROCORTISONE: 1 CREAM TOPICAL at 08:10

## 2017-10-15 RX ADMIN — ENOXAPARIN SODIUM 50 MG: 100 INJECTION SUBCUTANEOUS at 08:10

## 2017-10-15 RX ADMIN — IPRATROPIUM BROMIDE AND ALBUTEROL SULFATE 3 ML: .5; 3 SOLUTION RESPIRATORY (INHALATION) at 07:10

## 2017-10-15 RX ADMIN — IPRATROPIUM BROMIDE AND ALBUTEROL SULFATE 3 ML: .5; 3 SOLUTION RESPIRATORY (INHALATION) at 12:10

## 2017-10-15 RX ADMIN — VANCOMYCIN HYDROCHLORIDE 1000 MG: 1 INJECTION, POWDER, LYOPHILIZED, FOR SOLUTION INTRAVENOUS at 11:10

## 2017-10-15 RX ADMIN — PREDNISONE 60 MG: 20 TABLET ORAL at 08:10

## 2017-10-15 RX ADMIN — Medication 800 MG: at 05:10

## 2017-10-15 RX ADMIN — RAMELTEON 8 MG: 8 TABLET, FILM COATED ORAL at 09:10

## 2017-10-15 RX ADMIN — Medication 800 MG: at 11:10

## 2017-10-15 RX ADMIN — PIPERACILLIN SODIUM AND TAZOBACTAM SODIUM 4.5 G: 4; .5 INJECTION, POWDER, LYOPHILIZED, FOR SOLUTION INTRAVENOUS at 03:10

## 2017-10-15 RX ADMIN — EFAVIRENZ 600 MG: 600 TABLET, FILM COATED ORAL at 09:10

## 2017-10-15 RX ADMIN — VANCOMYCIN HYDROCHLORIDE 1000 MG: 1 INJECTION, POWDER, LYOPHILIZED, FOR SOLUTION INTRAVENOUS at 03:10

## 2017-10-15 RX ADMIN — HYDROCORTISONE: 1 CREAM TOPICAL at 09:10

## 2017-10-15 RX ADMIN — LOPERAMIDE HYDROCHLORIDE 2 MG: 2 CAPSULE ORAL at 05:10

## 2017-10-15 RX ADMIN — PIPERACILLIN SODIUM AND TAZOBACTAM SODIUM 4.5 G: 4; .5 INJECTION, POWDER, LYOPHILIZED, FOR SOLUTION INTRAVENOUS at 05:10

## 2017-10-15 RX ADMIN — ONDANSETRON 4 MG: 2 INJECTION INTRAMUSCULAR; INTRAVENOUS at 08:10

## 2017-10-15 RX ADMIN — PIPERACILLIN SODIUM AND TAZOBACTAM SODIUM 4.5 G: 4; .5 INJECTION, POWDER, LYOPHILIZED, FOR SOLUTION INTRAVENOUS at 09:10

## 2017-10-15 RX ADMIN — GABAPENTIN 900 MG: 300 CAPSULE ORAL at 09:10

## 2017-10-15 NOTE — PLAN OF CARE
Problem: Patient Care Overview  Goal: Plan of Care Review  Outcome: Ongoing (interventions implemented as appropriate)  Alternating periods on Bipap and 4 L NC.  O2 sats maintaining above 90% most of the time.  At times with exertion or cough, o2 sats noted in the mid 80's.  Recovers quickly.   Resumed diet today.  Good appetite.   Had 4 loose BM's today.  Eleanor ordered this pm.  Safety maintained.

## 2017-10-15 NOTE — PLAN OF CARE
Pharmacy doesn't have 0.5% hydrocortisone cream in stock. ZAIN Perez NP notified - ok to change to 1%. Relayed same to pharmacy.

## 2017-10-15 NOTE — PROGRESS NOTES
10/15/17 0721   Patient Assessment/Suction   Level of Consciousness (AVPU) responds to voice   All Lung Fields Breath Sounds clear;diminished   Cough Type none   PRE-TX-O2-ETCO2   O2 Device (Oxygen Therapy) nasal cannula   $ Is the patient on Oxygen? Yes   Flow (L/min) 3.5   SpO2 97 %   Pulse Oximetry Type Continuous   $ Pulse Oximetry - Multiple Charge Pulse Oximetry - Multiple   Pulse 85   Resp 15   Aerosol Therapy   $ Aerosol Therapy Charges Aerosol Treatment   Respiratory Treatment Status given   SVN/Inhaler Treatment Route mask   Position During Treatment HOB at 30 degrees   Patient Tolerance good   Post-Treatment   Post-treatment Heart Rate (beats/min) 85   Post-treatment Resp Rate (breaths/min) 14   All Fields Breath Sounds unchanged

## 2017-10-15 NOTE — PROGRESS NOTES
Progress Note  Cardiology    Admit Date: 10/12/2017   LOS: 2 days     Follow-up For:  COPD; CHF; resp failure.    Scheduled Meds:   albuterol-ipratropium 2.5mg-0.5mg/3mL  3 mL Nebulization Q6H    efavirenz  600 mg Oral QHS    And    emtricitabine  200 mg Oral QHS    And    tenofovir  300 mg Oral QHS    fluoxetine  40 mg Oral Nightly    furosemide  20 mg Intravenous Once    gabapentin  900 mg Oral TID    hydrocortisone   Topical BID    pantoprazole  40 mg Oral Daily    piperacillin-tazobactam 4.5 g in dextrose 5 % 100 mL IVPB (ready to mix system)  4.5 g Intravenous Q8H    predniSONE  60 mg Oral Daily    topiramate  100 mg Oral QHS    valacyclovir  500 mg Oral BID    vancomycin (VANCOCIN) IVPB  1,000 mg Intravenous Q18H     Continuous Infusions:   PRN Meds:acetaminophen, alprazolam, loperamide, magnesium oxide, magnesium oxide, morphine, ondansetron, potassium chloride 10%, potassium chloride 10%, potassium chloride 10%, ramelteon, senna-docusate 8.6-50 mg    Review of patient's allergies indicates:   Allergen Reactions    Unable to assess Other (See Comments)     EVELINIAN (DRUG FOR HIV) TAKEN 1989 ALMOST PARALYZED LEGS    Aztreonam Nausea And Vomiting       SUBJECTIVE:     Interval History: Patient is comfortable; off biPAP.  Had mild hypotension and received a small IVF bolus last night.    Review of Systems  Respiratory: positive for cough, negative for hemoptysis, sputum and wheezing  Cardiovascular: negative for chest pressure/discomfort, orthopnea, palpitations and paroxysmal nocturnal dyspnea    OBJECTIVE:     Vital Signs (Most Recent)  Temp: 98.4 °F (36.9 °C) (10/15/17 1600)  Pulse: 93 (10/15/17 1700)  Resp: (!) 23 (10/15/17 1700)  BP: (!) 142/68 (10/15/17 1700)  SpO2: 96 % (10/15/17 1700)    Vital Signs Range (Last 24H):  Temp:  [97.3 °F (36.3 °C)-98.6 °F (37 °C)]   Pulse:  []   Resp:  [12-32]   BP: ()/(48-90)   SpO2:  [84 %-100 %]       Physical Exam:  Neck: no carotid bruit, no  JVD and supple, symmetrical, trachea midline  Lungs: clear to auscultation bilaterally, normal respiratory effort  Heart: regular rate and rhythm, S1, S2 normal, no murmur, click, rub or gallop  Abdomen: soft, non-tender; bowel sounds normal; no masses,  no organomegaly  Extremities: Extremities normal, atraumatic, no cyanosis, clubbing, or edema    Recent Results (from the past 24 hour(s))   Protime-INR    Collection Time: 10/15/17  3:31 AM   Result Value Ref Range    Prothrombin Time 30.6 (H) 9.0 - 12.5 sec    INR 3.0 (H) 0.8 - 1.2   C-reactive protein    Collection Time: 10/15/17  3:31 AM   Result Value Ref Range    CRP 31.7 (H) 0.0 - 8.2 mg/L   CBC auto differential    Collection Time: 10/15/17  3:31 AM   Result Value Ref Range    WBC 6.40 3.90 - 12.70 K/uL    RBC 2.91 (L) 4.60 - 6.20 M/uL    Hemoglobin 9.3 (L) 14.0 - 18.0 g/dL    Hematocrit 29.3 (L) 40.0 - 54.0 %     (H) 82 - 98 fL    MCH 31.9 (H) 27.0 - 31.0 pg    MCHC 31.8 (L) 32.0 - 36.0 g/dL    RDW 17.0 (H) 11.5 - 14.5 %    Platelets 151 150 - 350 K/uL    MPV 8.3 (L) 9.2 - 12.9 fL    Gran # 4.4 1.8 - 7.7 K/uL    Lymph # 1.5 1.0 - 4.8 K/uL    Mono # 0.5 0.3 - 1.0 K/uL    Eos # 0.0 0.0 - 0.5 K/uL    Baso # 0.00 0.00 - 0.20 K/uL    Gran% 68.4 38.0 - 73.0 %    Lymph% 23.4 18.0 - 48.0 %    Mono% 7.1 4.0 - 15.0 %    Eosinophil% 0.5 0.0 - 8.0 %    Basophil% 0.6 0.0 - 1.9 %    Differential Method Automated    Comprehensive metabolic panel    Collection Time: 10/15/17  3:31 AM   Result Value Ref Range    Sodium 142 136 - 145 mmol/L    Potassium 3.1 (L) 3.5 - 5.1 mmol/L    Chloride 99 95 - 110 mmol/L    CO2 34 (H) 23 - 29 mmol/L    Glucose 95 70 - 110 mg/dL    BUN, Bld 12 8 - 23 mg/dL    Creatinine 1.0 0.5 - 1.4 mg/dL    Calcium 8.2 (L) 8.7 - 10.5 mg/dL    Total Protein 5.9 (L) 6.0 - 8.4 g/dL    Albumin 2.5 (L) 3.5 - 5.2 g/dL    Total Bilirubin 0.3 0.1 - 1.0 mg/dL    Alkaline Phosphatase 90 55 - 135 U/L    AST 12 10 - 40 U/L    ALT 5 (L) 10 - 44 U/L    Anion  Gap 9 8 - 16 mmol/L    eGFR if African American >60 >60 mL/min/1.73 m^2    eGFR if non African American >60 >60 mL/min/1.73 m^2   Magnesium    Collection Time: 10/15/17  3:31 AM   Result Value Ref Range    Magnesium 1.5 (L) 1.6 - 2.6 mg/dL   Phosphorus    Collection Time: 10/15/17  3:31 AM   Result Value Ref Range    Phosphorus 2.7 2.7 - 4.5 mg/dL   Brain natriuretic peptide    Collection Time: 10/15/17  3:31 AM   Result Value Ref Range     (H) 0 - 99 pg/mL   ISTAT PROCEDURE    Collection Time: 10/15/17  6:59 AM   Result Value Ref Range    POC PH 7.332 (L) 7.35 - 7.45    POC PCO2 65.7 (HH) 35 - 45 mmHg    POC PO2 61 (L) 80 - 100 mmHg    POC HCO3 34.9 (H) 24 - 28 mmol/L    POC BE 9 -2 to 2 mmol/L    POC SATURATED O2 88 (L) 95 - 100 %    POC TCO2 37 (H) 23 - 27 mmol/L    Sample ARTERIAL     Site RB     Allens Test N/A     DelSys Nasal Can        Diagnostic Results:  Labs: Reviewed  ECG: Reviewed  X-Ray: Reviewed    ASSESSMENT/PLAN:     Lasix held today; 20 mg q am. Resp acidosis. May transfer to floor.

## 2017-10-15 NOTE — PROGRESS NOTES
Results for POP SCHAFER (MRN [id number]) as of 10/15/2017 05:42   Ref. Range 10/15/2017 03:31   Potassium Latest Ref Range: 3.5 - 5.1 mmol/L 3.1 (L)   Results for POP SCHAFER (MRN [id number]) as of 10/15/2017 05:42   Ref. Range 10/15/2017 03:31   Magnesium Latest Ref Range: 1.6 - 2.6 mg/dL 1.5 (L)     Request placed via eICU for electrolyte replacement. Dr. Rice in a Code, RN will try to pass on message. Will notify day shift if no new orders by change of shift.

## 2017-10-15 NOTE — PLAN OF CARE
10/14/17 1946   Patient Assessment/Suction   Level of Consciousness (AVPU) alert   Respiratory Effort Normal;Unlabored   Expansion/Accessory Muscles/Retractions expansion symmetric;no retractions;no use of accessory muscles   All Lung Fields Breath Sounds diminished   Cough Frequency frequent   Cough Type productive   Suction Method oral   Sputum Amount small   Sputum Color tan   Sputum Consistency thick   PRE-TX-O2-ETCO2   O2 Device (Oxygen Therapy) BiPAP   Oxygen Concentration (%) 40   SpO2 99 %   Pulse Oximetry Type Continuous   Pulse 66   Resp 18   Aerosol Therapy   $ Aerosol Therapy Charges Aerosol Treatment   Respiratory Treatment Status given   SVN/Inhaler Treatment Route in-line;with oxygen   Position During Treatment HOB at 45 degrees   Patient Tolerance good   Post-Treatment   Post-treatment Heart Rate (beats/min) 64   Post-treatment Resp Rate (breaths/min) 17   All Fields Breath Sounds unchanged   Ready to Wean/Extubation Screen   FIO2<=50 (chart decimal) 0.4   Preset CPAP/BiPAP Settings   Mode Of Delivery BiPAP   $ Initial CPAP/BiPAP Setup? No   $ Is patient using? Yes   Equipment Type V60   Airway Device Type large full face mask   Humidifier not applicable   Ipap 15   EPAP (cm H2O) 5   Pressure Support (cm H2O) 10   Set Rate (Breaths/Min) 14   ITime (sec) 1   Rise Time (sec) 3   Patient CPAP/BiPAP Settings   Timed Inspiration (Sec) 1   IPAP Rise Time (sec) 3   RR Total (Breaths/Min) 18   Tidal Volume (mL) 507   VE Minute Ventilation (L/min) 9.2 L/min   Peak Inspiratory Pressure (cm H2O) 15   TiTOT (%) 35   Total Leak (L/Min) 10   Patient Trigger - ST Mode Only (%) 85   CPAP/BiPAP Alarms   High Pressure (cm H2O) 20   Low Pressure (cm H2O) 10   Low Pressure Delay (Sec) 20   Minute Ventilation (L/Min) 3   High RR (breaths/min) 35   Low RR (breaths/min) 8

## 2017-10-15 NOTE — PROGRESS NOTES
150cc fluid challenge via [place] per Dr. Lopez. Results of same relayed to Dr. Lopez via phone. New order noted to bolus 500cc & call with new results. Results relayed - no new orders noted other than to hold lasix admin.

## 2017-10-15 NOTE — NURSING
Awake and c/o nausea.  Zofran 4mg ivp given.  Asking for food.  Instructed on NPO status secondary to respiratory status and informed him that if he is nauseated that he shouldn't eat at this time.  Complaining that he is probably nauseated b/c he has not eaten.   Instructed that we would check with MD on rounds.  Call bell at side.

## 2017-10-15 NOTE — PLAN OF CARE
Problem: Patient Care Overview  Goal: Plan of Care Review  Outcome: Ongoing (interventions implemented as appropriate)  Constant c/o being cold, despite several blankets & AC adjusted in room. Asked questions about POC & how he was doing throughout shift. More alert towards AM hours. Bipap remained on majority of shift. Quickly desats to 70's when off O2. 4L NC placed for PO med administration. Repeated requests for food & Coke. Explained the reason for NPO status - pt not accepting of explanation. Ortega patent. BM this shift. SR on monitor with infrequent PVCs. Assists with cleaning/bathing, can lift hips off bed & turn. Hydrocortisone cream applied to rash on L hip/glute.

## 2017-10-15 NOTE — PROGRESS NOTES
Dr. Lopez notified that patient blood pressure is getting low again. Dr. Lopez ordered for the Cheetah to be placed on patient and give patient a 150 ml bolus of normal saline and to call him back with results. He also stated to hold the lasix for now.

## 2017-10-15 NOTE — SIGNIFICANT EVENT
Results for POP SCHAFER (MRN [id number]) as of 10/15/2017 07:18   Ref. Range 10/15/2017 06:59   POC PH Latest Ref Range: 7.35 - 7.45  7.332 (L)   POC PCO2 Latest Ref Range: 35 - 45 mmHg 65.7 (HH)   POC PO2 Latest Ref Range: 80 - 100 mmHg 61 (L)   POC BE Latest Ref Range: -2 to 2 mmol/L 9   POC HCO3 Latest Ref Range: 24 - 28 mmol/L 34.9 (H)   POC SATURATED O2 Latest Ref Range: 95 - 100 % 88 (L)   POC TCO2 Latest Ref Range: 23 - 27 mmol/L 37 (H)   Sample Unknown ARTERIAL   DelSys Unknown Nasal Can   Allens Test Unknown N/A   Site Unknown RB

## 2017-10-16 LAB
ALBUMIN SERPL BCP-MCNC: 2.5 G/DL
ALP SERPL-CCNC: 76 U/L
ALT SERPL W/O P-5'-P-CCNC: 7 U/L
ANION GAP SERPL CALC-SCNC: 7 MMOL/L
AST SERPL-CCNC: 13 U/L
BACTERIA UR CULT: NO GROWTH
BASOPHILS # BLD AUTO: 0 K/UL
BASOPHILS NFR BLD: 0.4 %
BILIRUB SERPL-MCNC: 0.2 MG/DL
BUN SERPL-MCNC: 13 MG/DL
CALCIUM SERPL-MCNC: 8.4 MG/DL
CHLORIDE SERPL-SCNC: 102 MMOL/L
CO2 SERPL-SCNC: 35 MMOL/L
CREAT SERPL-MCNC: 1.3 MG/DL
DIFFERENTIAL METHOD: ABNORMAL
EOSINOPHIL # BLD AUTO: 0.1 K/UL
EOSINOPHIL NFR BLD: 1.3 %
ERYTHROCYTE [DISTWIDTH] IN BLOOD BY AUTOMATED COUNT: 17 %
EST. GFR  (AFRICAN AMERICAN): >60 ML/MIN/1.73 M^2
EST. GFR  (NON AFRICAN AMERICAN): 55 ML/MIN/1.73 M^2
GLUCOSE SERPL-MCNC: 101 MG/DL
HCT VFR BLD AUTO: 27.7 %
HGB BLD-MCNC: 8.7 G/DL
INR PPP: 3.5
LYMPHOCYTES # BLD AUTO: 1.7 K/UL
LYMPHOCYTES NFR BLD: 24 %
MAGNESIUM SERPL-MCNC: 1.9 MG/DL
MCH RBC QN AUTO: 31.5 PG
MCHC RBC AUTO-ENTMCNC: 31.5 G/DL
MCV RBC AUTO: 100 FL
MONOCYTES # BLD AUTO: 0.5 K/UL
MONOCYTES NFR BLD: 6.9 %
NEUTROPHILS # BLD AUTO: 4.8 K/UL
NEUTROPHILS NFR BLD: 67.4 %
PHOSPHATE SERPL-MCNC: 1.6 MG/DL
PLATELET # BLD AUTO: 149 K/UL
PMV BLD AUTO: 8.4 FL
POTASSIUM SERPL-SCNC: 3.4 MMOL/L
PROT SERPL-MCNC: 5.8 G/DL
PROTHROMBIN TIME: 35.2 SEC
RBC # BLD AUTO: 2.76 M/UL
SODIUM SERPL-SCNC: 144 MMOL/L
VANCOMYCIN TROUGH SERPL-MCNC: 22.2 UG/ML
WBC # BLD AUTO: 7.1 K/UL

## 2017-10-16 PROCEDURE — 27000221 HC OXYGEN, UP TO 24 HOURS

## 2017-10-16 PROCEDURE — 84100 ASSAY OF PHOSPHORUS: CPT

## 2017-10-16 PROCEDURE — 63600175 PHARM REV CODE 636 W HCPCS: Performed by: INTERNAL MEDICINE

## 2017-10-16 PROCEDURE — 20000000 HC ICU ROOM

## 2017-10-16 PROCEDURE — 25000003 PHARM REV CODE 250: Performed by: SPECIALIST

## 2017-10-16 PROCEDURE — 25000242 PHARM REV CODE 250 ALT 637 W/ HCPCS: Performed by: INTERNAL MEDICINE

## 2017-10-16 PROCEDURE — 25000003 PHARM REV CODE 250: Performed by: EMERGENCY MEDICINE

## 2017-10-16 PROCEDURE — 83735 ASSAY OF MAGNESIUM: CPT

## 2017-10-16 PROCEDURE — 80202 ASSAY OF VANCOMYCIN: CPT

## 2017-10-16 PROCEDURE — 36415 COLL VENOUS BLD VENIPUNCTURE: CPT

## 2017-10-16 PROCEDURE — 25000003 PHARM REV CODE 250: Performed by: NURSE PRACTITIONER

## 2017-10-16 PROCEDURE — 63600175 PHARM REV CODE 636 W HCPCS: Performed by: HOSPITALIST

## 2017-10-16 PROCEDURE — 94761 N-INVAS EAR/PLS OXIMETRY MLT: CPT

## 2017-10-16 PROCEDURE — 85025 COMPLETE CBC W/AUTO DIFF WBC: CPT

## 2017-10-16 PROCEDURE — 99233 SBSQ HOSP IP/OBS HIGH 50: CPT | Mod: ,,, | Performed by: INTERNAL MEDICINE

## 2017-10-16 PROCEDURE — 25000003 PHARM REV CODE 250: Performed by: HOSPITALIST

## 2017-10-16 PROCEDURE — 94640 AIRWAY INHALATION TREATMENT: CPT

## 2017-10-16 PROCEDURE — 99900035 HC TECH TIME PER 15 MIN (STAT)

## 2017-10-16 PROCEDURE — 94660 CPAP INITIATION&MGMT: CPT

## 2017-10-16 PROCEDURE — 25000242 PHARM REV CODE 250 ALT 637 W/ HCPCS: Performed by: HOSPITALIST

## 2017-10-16 PROCEDURE — 25000003 PHARM REV CODE 250: Performed by: INTERNAL MEDICINE

## 2017-10-16 PROCEDURE — 80053 COMPREHEN METABOLIC PANEL: CPT

## 2017-10-16 PROCEDURE — 85610 PROTHROMBIN TIME: CPT

## 2017-10-16 RX ORDER — LISINOPRIL 2.5 MG/1
2.5 TABLET ORAL DAILY
Status: DISCONTINUED | OUTPATIENT
Start: 2017-10-16 | End: 2017-10-18

## 2017-10-16 RX ORDER — ALBUTEROL SULFATE 2.5 MG/.5ML
2.5 SOLUTION RESPIRATORY (INHALATION) EVERY 6 HOURS
Status: DISCONTINUED | OUTPATIENT
Start: 2017-10-16 | End: 2017-10-19 | Stop reason: HOSPADM

## 2017-10-16 RX ORDER — SODIUM,POTASSIUM PHOSPHATES 280-250MG
1 POWDER IN PACKET (EA) ORAL 3 TIMES DAILY
Status: COMPLETED | OUTPATIENT
Start: 2017-10-16 | End: 2017-10-17

## 2017-10-16 RX ADMIN — POTASSIUM CHLORIDE 40 MEQ: 20 SOLUTION ORAL at 05:10

## 2017-10-16 RX ADMIN — LOPERAMIDE HYDROCHLORIDE 2 MG: 2 CAPSULE ORAL at 09:10

## 2017-10-16 RX ADMIN — HYDROCORTISONE: 1 CREAM TOPICAL at 09:10

## 2017-10-16 RX ADMIN — ALBUTEROL SULFATE 2.5 MG: 2.5 SOLUTION RESPIRATORY (INHALATION) at 07:10

## 2017-10-16 RX ADMIN — GABAPENTIN 900 MG: 300 CAPSULE ORAL at 02:10

## 2017-10-16 RX ADMIN — GABAPENTIN 900 MG: 300 CAPSULE ORAL at 05:10

## 2017-10-16 RX ADMIN — PANTOPRAZOLE SODIUM 40 MG: 40 TABLET, DELAYED RELEASE ORAL at 09:10

## 2017-10-16 RX ADMIN — VALACYCLOVIR HYDROCHLORIDE 500 MG: 500 TABLET, FILM COATED ORAL at 09:10

## 2017-10-16 RX ADMIN — POTASSIUM & SODIUM PHOSPHATES POWDER PACK 280-160-250 MG 1 PACKET: 280-160-250 PACK at 02:10

## 2017-10-16 RX ADMIN — ALBUTEROL SULFATE 2.5 MG: 2.5 SOLUTION RESPIRATORY (INHALATION) at 01:10

## 2017-10-16 RX ADMIN — PIPERACILLIN SODIUM AND TAZOBACTAM SODIUM 4.5 G: 4; .5 INJECTION, POWDER, LYOPHILIZED, FOR SOLUTION INTRAVENOUS at 10:10

## 2017-10-16 RX ADMIN — VANCOMYCIN HYDROCHLORIDE 750 MG: 500 INJECTION, POWDER, LYOPHILIZED, FOR SOLUTION INTRAVENOUS at 11:10

## 2017-10-16 RX ADMIN — LISINOPRIL 2.5 MG: 2.5 TABLET ORAL at 02:10

## 2017-10-16 RX ADMIN — PIPERACILLIN SODIUM AND TAZOBACTAM SODIUM 4.5 G: 4; .5 INJECTION, POWDER, LYOPHILIZED, FOR SOLUTION INTRAVENOUS at 02:10

## 2017-10-16 RX ADMIN — PIPERACILLIN SODIUM AND TAZOBACTAM SODIUM 4.5 G: 4; .5 INJECTION, POWDER, LYOPHILIZED, FOR SOLUTION INTRAVENOUS at 05:10

## 2017-10-16 RX ADMIN — PREDNISONE 60 MG: 20 TABLET ORAL at 09:10

## 2017-10-16 RX ADMIN — POTASSIUM CHLORIDE 40 MEQ: 20 SOLUTION ORAL at 09:10

## 2017-10-16 RX ADMIN — IPRATROPIUM BROMIDE AND ALBUTEROL SULFATE 3 ML: .5; 3 SOLUTION RESPIRATORY (INHALATION) at 12:10

## 2017-10-16 RX ADMIN — EFAVIRENZ 600 MG: 600 TABLET, FILM COATED ORAL at 09:10

## 2017-10-16 RX ADMIN — FUROSEMIDE 20 MG: 20 TABLET ORAL at 09:10

## 2017-10-16 RX ADMIN — TOPIRAMATE 100 MG: 100 TABLET, FILM COATED ORAL at 09:10

## 2017-10-16 RX ADMIN — EMTRICITABINE 200 MG: 200 CAPSULE ORAL at 09:10

## 2017-10-16 RX ADMIN — TENOFOVIR DISOPROXIL FUMARATE 300 MG: 300 TABLET, COATED ORAL at 09:10

## 2017-10-16 RX ADMIN — FLUOXETINE 40 MG: 20 CAPSULE ORAL at 09:10

## 2017-10-16 RX ADMIN — ALPRAZOLAM 0.25 MG: 0.25 TABLET ORAL at 05:10

## 2017-10-16 RX ADMIN — POTASSIUM & SODIUM PHOSPHATES POWDER PACK 280-160-250 MG 1 PACKET: 280-160-250 PACK at 09:10

## 2017-10-16 RX ADMIN — GABAPENTIN 900 MG: 300 CAPSULE ORAL at 09:10

## 2017-10-16 NOTE — CONSULTS
Boni Lerner [id number] is a 72 y.o. male who has been consulted for vancomycin dosing.    The patient has the following labs:     Date Creatinine (mg/dl)    BUN WBC Count   10/16/2017 Estimated Creatinine Clearance: 37.9 mL/min (based on SCr of 1.3 mg/dL). Lab Results   Component Value Date    BUN 13 10/16/2017     Lab Results   Component Value Date    WBC 7.10 10/16/2017        Current weight is 52.2 kg (115 lb 1.3 oz)      Vancomycin trough from 10/16  was 22.2 mg/dL.  Target trough range is 15-20 mg/dL.   The patient will be changed to a vancomycin dose of 750 mg every 24 hours. A vancomycin trough has been ordered prior to the dose due 10/17 at 2330.      Patient will be followed by pharmacy for changes in renal function, toxicity, and efficacy.  Thank you for allowing us to participate in this patient's care.     Kadi Eden

## 2017-10-16 NOTE — PLAN OF CARE
10/15/17 1959   Patient Assessment/Suction   Level of Consciousness (AVPU) alert   Respiratory Effort Mild;Labored   Expansion/Accessory Muscles/Retractions expansion symmetric   FARHAT Breath Sounds clear;diminished   LLL Breath Sounds clear;diminished   RUL Breath Sounds clear   RML Breath Sounds wheezes, inspiratory   RLL Breath Sounds wheezes, inspiratory;diminished   Rhythm/Pattern, Respiratory pattern regular   Cough Frequency infrequent   Cough Type fair;nonproductive;loose   PRE-TX-O2-ETCO2   O2 Device (Oxygen Therapy) nasal cannula   $ Is the patient on Oxygen? Yes   Flow (L/min) 4   SpO2 100 %   Pulse Oximetry Type Continuous   Pulse 100   Resp 18   Aerosol Therapy   $ Aerosol Therapy Charges Aerosol Treatment   Respiratory Treatment Status given   SVN/Inhaler Treatment Route mask   Position During Treatment HOB at 30 degrees   Patient Tolerance good   Post-Treatment   Post-treatment Heart Rate (beats/min) 91   Post-treatment Resp Rate (breaths/min) 18   All Fields Breath Sounds unchanged

## 2017-10-16 NOTE — PROGRESS NOTES
Progress Note  Cardiology    Admit Date: 10/12/2017   LOS: 3 days     Follow-up For:  cardiology    Scheduled Meds:   albuterol sulfate  2.5 mg Nebulization Q6H    efavirenz  600 mg Oral QHS    And    emtricitabine  200 mg Oral QHS    And    tenofovir  300 mg Oral QHS    fluoxetine  40 mg Oral Nightly    furosemide  20 mg Intravenous Once    furosemide  20 mg Oral Daily    gabapentin  900 mg Oral TID    hydrocortisone   Topical BID    pantoprazole  40 mg Oral Daily    piperacillin-tazobactam 4.5 g in dextrose 5 % 100 mL IVPB (ready to mix system)  4.5 g Intravenous Q8H    predniSONE  60 mg Oral Daily    topiramate  100 mg Oral QHS    valacyclovir  500 mg Oral BID    vancomycin (VANCOCIN) IVPB  1,000 mg Intravenous Q18H     Continuous Infusions:   PRN Meds:acetaminophen, alprazolam, loperamide, magnesium oxide, magnesium oxide, morphine, ondansetron, potassium chloride 10%, potassium chloride 10%, potassium chloride 10%, ramelteon, senna-docusate 8.6-50 mg    Review of patient's allergies indicates:   Allergen Reactions    Unable to assess Other (See Comments)     REX (DRUG FOR HIV) TAKEN 1989 ALMOST PARALYZED LEGS    Aztreonam Nausea And Vomiting       SUBJECTIVE:     Interval History: Patient has no complaint of  Chest pain /       OBJECTIVE:     Vital Signs (Most Recent)  Temp: 98.6 °F (37 °C) (10/16/17 0800)  Pulse: 101 (10/16/17 1000)  Resp: (!) 21 (10/16/17 1000)  BP: (!) 114/56 (10/16/17 1000)  SpO2: 98 % (10/16/17 1000)    Vital Signs Range (Last 24H):  Temp:  [98 °F (36.7 °C)-98.6 °F (37 °C)]   Pulse:  []   Resp:  [14-30]   BP: ()/(52-90)   SpO2:  [85 %-100 %]       Physical Exam:  Neck: no carotid bruit and no JVD  Lungs: diminished breath sounds bibasilar  Heart: regular rate and rhythm  Extremities: Extremities normal, atraumatic, no cyanosis, clubbing, or edema    Recent Results (from the past 24 hour(s))   VANCOMYCIN, TROUGH before 3rd dose    Collection Time: 10/15/17  10:42 PM   Result Value Ref Range    Vancomycin-Trough 17.0 10.0 - 22.0 ug/mL   Protime-INR    Collection Time: 10/16/17  3:21 AM   Result Value Ref Range    Prothrombin Time 35.2 (H) 9.0 - 12.5 sec    INR 3.5 (H) 0.8 - 1.2   CBC auto differential    Collection Time: 10/16/17  3:21 AM   Result Value Ref Range    WBC 7.10 3.90 - 12.70 K/uL    RBC 2.76 (L) 4.60 - 6.20 M/uL    Hemoglobin 8.7 (L) 14.0 - 18.0 g/dL    Hematocrit 27.7 (L) 40.0 - 54.0 %     (H) 82 - 98 fL    MCH 31.5 (H) 27.0 - 31.0 pg    MCHC 31.5 (L) 32.0 - 36.0 g/dL    RDW 17.0 (H) 11.5 - 14.5 %    Platelets 149 (L) 150 - 350 K/uL    MPV 8.4 (L) 9.2 - 12.9 fL    Gran # 4.8 1.8 - 7.7 K/uL    Lymph # 1.7 1.0 - 4.8 K/uL    Mono # 0.5 0.3 - 1.0 K/uL    Eos # 0.1 0.0 - 0.5 K/uL    Baso # 0.00 0.00 - 0.20 K/uL    Gran% 67.4 38.0 - 73.0 %    Lymph% 24.0 18.0 - 48.0 %    Mono% 6.9 4.0 - 15.0 %    Eosinophil% 1.3 0.0 - 8.0 %    Basophil% 0.4 0.0 - 1.9 %    Differential Method Automated    Comprehensive metabolic panel    Collection Time: 10/16/17  3:21 AM   Result Value Ref Range    Sodium 144 136 - 145 mmol/L    Potassium 3.4 (L) 3.5 - 5.1 mmol/L    Chloride 102 95 - 110 mmol/L    CO2 35 (H) 23 - 29 mmol/L    Glucose 101 70 - 110 mg/dL    BUN, Bld 13 8 - 23 mg/dL    Creatinine 1.3 0.5 - 1.4 mg/dL    Calcium 8.4 (L) 8.7 - 10.5 mg/dL    Total Protein 5.8 (L) 6.0 - 8.4 g/dL    Albumin 2.5 (L) 3.5 - 5.2 g/dL    Total Bilirubin 0.2 0.1 - 1.0 mg/dL    Alkaline Phosphatase 76 55 - 135 U/L    AST 13 10 - 40 U/L    ALT 7 (L) 10 - 44 U/L    Anion Gap 7 (L) 8 - 16 mmol/L    eGFR if African American >60 >60 mL/min/1.73 m^2    eGFR if non African American 55 (A) >60 mL/min/1.73 m^2   Phosphorus    Collection Time: 10/16/17  3:21 AM   Result Value Ref Range    Phosphorus 1.6 (L) 2.7 - 4.5 mg/dL   Magnesium    Collection Time: 10/16/17  3:21 AM   Result Value Ref Range    Magnesium 1.9 1.6 - 2.6 mg/dL       Diagnostic Results:  Labs: Reviewed  X-Ray:  Reviewed    ASSESSMENT/PLAN:     Improved ,started on lasix. Will add low dose ACEI    Plan: Continue Current.

## 2017-10-16 NOTE — NURSING
"Awake and alert.  Pulled Bipap off.  O2 sats decreased to low 80's.  Placed on 4 L NC with o2 sats recovering to the 90's.  Instructed not to pull Bipap or O2 off, and to call nurse for assistance.  States, " well , I was ready to eat."    Tray set up and call bell at side.   "

## 2017-10-16 NOTE — CONSULTS
Boni Lerner [id number] is a 72 y.o. male who has been consulted for vancomycin dosing.    The patient has the following labs:     Date Creatinine (mg/dl)    BUN WBC Count   10/15/2017 Estimated Creatinine Clearance: 49.3 mL/min (based on SCr of 1 mg/dL). Lab Results   Component Value Date    BUN 12 10/15/2017     Lab Results   Component Value Date    WBC 6.40 10/15/2017        Current weight is 52.2 kg (115 lb 1.3 oz)    Pt is receiving vancomycin 1000 mg every 18 hours.  Vancomycin trough from 10/15 at 2242 was 17 mg/dL.  Target trough range is 15-20 mg/dL.   Trough was drawn on time and anticipate it is therapeutic. Pharmacy will continue current dose.   A vancomycin trough has been ordered for 10/17 at 1000.      Patient will be followed by pharmacy for changes in renal function, toxicity, and efficacy.  Thank you for allowing us to participate in this patient's care.     Haley Marie

## 2017-10-16 NOTE — PLAN OF CARE
Problem: Patient Care Overview  Goal: Plan of Care Review  Outcome: Ongoing (interventions implemented as appropriate)  Patient wore BiPAP for several hours last night, but refused after 0300. Pt wearing NC at 4L. No loose stools. Patient slept well for several hours. VSS.

## 2017-10-16 NOTE — PROGRESS NOTES
Progress Note  Hospital Medicine  Patient Name:Boni Lerner  MRN:  [id number]  Patient Class: IP- Inpatient  Admit Date: 10/12/2017  Length of Stay: 3 days  Expected Discharge Date:   Attending Physician: Analilia Nolasco MD  Primary Care Provider:  Gilberto Rosas MD    SUBJECTIVE:     Principal Problem: Acute respiratory failure with hypoxia and hypercapnia  Initial history of present illness:Mr. Lerner is a 72yo Wm with a PMH COPD, MI, HIV, and HTN. He presents to the hospital tonight with c/o worsening SOB and wheezing over 1 week. It is associated with chills, decreased appetite, and fatigue. He is not sure if he had fever or not. He is also having diarrhea and nausea without vomiting. He is on warfarin therapy, which he states that he has a blood clot in his heart, and his INR was found to be >10.0. Vitamin K was administered in the ED. He denies any bleeding. He currently denies any pain or discomforts.    PMH/PSH/SH/FH/Meds: reviewed.    Symptoms/Review of Systems: Patient reports continued shortness of breath and generalized weakness. No chest pain or headache, fever or abdominal pain.     Diet:  Adequate intake.    Activity level: Normal.    Pain:  Patient reports no pain.       OBJECTIVE:   Vital Signs (Most Recent):      Temp: 98.2 °F (36.8 °C) (10/16/17 0400)  Pulse: 91 (10/16/17 0830)  Resp: 17 (10/16/17 0830)  BP: (!) 107/59 (10/16/17 0830)  SpO2: 100 % (10/16/17 0830)       Vital Signs Range (Last 24H):  Temp:  [98 °F (36.7 °C)-98.5 °F (36.9 °C)]   Pulse:  []   Resp:  [14-30]   BP: ()/(52-90)   SpO2:  [85 %-100 %]     Weight: 52.2 kg (115 lb 1.3 oz)  Body mass index is 16.51 kg/m².    Intake/Output Summary (Last 24 hours) at 10/16/17 1022  Last data filed at 10/16/17 0547   Gross per 24 hour   Intake             1390 ml   Output             1875 ml   Net             -485 ml     Physical Examination:  HENT:   Head: Normocephalic and atraumatic.   Eyes: Conjunctivae are normal. Pupils are  equal, round, and reactive to light.   Neck: Normal range of motion. Neck supple. No JVD present. No tracheal deviation present.   Cardiovascular: Normal rate, regular rhythm, normal heart sounds and intact distal pulses.    No murmur heard.  NSR on telemetry   Pulmonary/Chest:   Mildly tachypneic/ Decreased air movement throughout/ Wearing O2 via NC   Abdominal: Soft. Bowel sounds are normal. He exhibits no distension. There is no tenderness.   Musculoskeletal: Normal range of motion. He exhibits no edema.   Generalized weakness   Neurological: He is alert and oriented to person, place, and time. No cranial nerve deficit.   Skin: Skin is warm and dry. There is pallor.   Psychiatric: He has a normal mood and affect. His behavior is normal. Judgment and thought content normal.     CBC:    Recent Labs  Lab 10/14/17  0515 10/15/17  0331 10/16/17  0321   WBC 9.30 6.40 7.10   RBC 3.13* 2.91* 2.76*   HGB 9.9* 9.3* 8.7*   HCT 31.6* 29.3* 27.7*    151 149*   * 101* 100*   MCH 31.7* 31.9* 31.5*   MCHC 31.4* 31.8* 31.5*   BMP    Recent Labs  Lab 10/12/17  1445  10/14/17  0515 10/15/17  0331 10/16/17  0321     < > 109 95 101     < > 144 142 144   K 3.6  < > 3.8 3.1* 3.4*     < > 105 99 102   CO2 33*  < > 34* 34* 35*   BUN 14  < > 11 12 13   CREATININE 0.9  < > 1.0 1.0 1.3   CALCIUM 8.5*  < > 8.7 8.2* 8.4*   MG 2.0  --   --  1.5* 1.9   < > = values in this interval not displayed.   Diagnostic Results:  Microbiology Results (last 7 days)     Procedure Component Value Units Date/Time    Culture, Respiratory with Gram Stain [[id number]] Collected:  10/14/17 1435    Order Status:  Completed Specimen:  Respiratory from Sputum, Expectorated Updated:  10/16/17 0945     Respiratory Culture Normal respiratory eden     Gram Stain (Respiratory) <10 epithelial cells per low power field.     Gram Stain (Respiratory) Rare WBC's     Gram Stain (Respiratory) Rare Gram negative rods     Gram Stain (Respiratory)  Rare budding yeast    Urine culture [[id number]] Collected:  10/14/17 1449    Order Status:  Completed Specimen:  Urine from Urine, Catheterized Updated:  10/16/17 0940     Urine Culture, Routine No growth    Blood culture [[id number]] Collected:  10/14/17 1338    Order Status:  Completed Specimen:  Blood from Antecubital, Right  Arm Updated:  10/16/17 0613     Blood Culture, Routine No Growth to date     Blood Culture, Routine No Growth to date    Narrative:       Aerobic and anaerobic    Blood culture [[id number]] Collected:  10/14/17 1316    Order Status:  Completed Specimen:  Blood from Antecubital, Right  Hand Updated:  10/16/17 0613     Blood Culture, Routine No Growth to date     Blood Culture, Routine No Growth to date    Narrative:       Aerobic and anaerobic    Blood Culture #2 [[id number]] Collected:  10/12/17 1726    Order Status:  Completed Specimen:  Blood Updated:  10/15/17 2212     Blood Culture, Routine No Growth to date     Blood Culture, Routine No Growth to date     Blood Culture, Routine No Growth to date     Blood Culture, Routine No Growth to date    Blood Culture #1 [[id number]] Collected:  10/12/17 1715    Order Status:  Completed Specimen:  Blood Updated:  10/15/17 2212     Blood Culture, Routine No Growth to date     Blood Culture, Routine No Growth to date     Blood Culture, Routine No Growth to date     Blood Culture, Routine No Growth to date    Culture, Respiratory with Gram Stain [[id number]]     Order Status:  Canceled Specimen:  Respiratory            Chest X-Ray: Increase in bilateral infiltrates suggesting pulmonary edema.  Small left pleural effusion.    ECHO:    1 - Concentric hypertrophy.     2 - Wall motion abnormalities.     3 - Moderately depressed left ventricular systolic function (EF 35-40%).     4 - Impaired LV relaxation, normal LAP (grade 1 diastolic dysfunction).     5 - Intermediate central venous pressure.     6 - Left pleural effusion.   '  CXR: Rotated radiograph.  Infiltrate and atelectasis at the left mid and lower lung with left pleural effusion, unchanged. Possible mild decrease in infiltrate on the right.    CXR: Continued infiltrate and atelectasis with volume loss of the left lung. Atherosclerosis. Rotated radiograph with resolved infiltrate on the right    Assessment/Plan:      Acute Hypoxic respiratory failure  Chronic obstructive pulmonary disease with acute exacerbation  HCAP  O2 therapy. Use BiPAP, will consult pulmonologist, Close monitoring in ICU.  Nebulizers  Steroids  Continue IV antibiotics.  Monitor for an improvement in condition     Acute Systolic CHF exacerbation  Supplemental O2 via nasal canula; titrate O2 saturation to >92%.  On IV Lasix 20 mg daily. Follow cardiology recommendations.  2-D Echocardiogram for evaluation of left ventricle systolic function or any valvular heart disease.  Daily weights.  Strict I/Os.  Fluid restriction.  Na restriction <2g/d.    Sepsis- Cultures are still in progress.  Will continue broad-spectrum antibiotic therapy for now for severe sepsis  Patient is not on pressors at the moment, and seems to have responded with fluids only.     Essential hypertension-Controlled  Chronic. Continue home BP medications and adjust as needed.    Elevated INR  Hold Coumadin for 2 days. Check daily INR.     Tobacco abuse  Smoking cessation encouraged  Nicotine patch     HIV (human immunodeficiency virus infection)  Chronic. Continue home HIV medications.    Severe Malnutrition  Severe. Nutrition consulted. Encourage maximal PO intake. Diet supplementation ordered per nutrition approval. Will encourage PO and monitor closely for weight changes.    VTE Risk Mitigation     On Coumadin        Analilia Nolasco MD  Department of Hospital Medicine   [facility]

## 2017-10-16 NOTE — NURSING
"C/O "not feeling good and being tired."   Slight dyspnea after pads changed.   Placed back on Bipap.   Continue to monitor.  Dr. Wu on rounds.  Updated.   "

## 2017-10-16 NOTE — CONSULTS
Dictation #1  MRN:[id number]  CSN:[id number]  HIV  pn  Copd       Smoker  malnutritom    Continue antibiotic steroid neb  Agree with DNR

## 2017-10-16 NOTE — PROGRESS NOTES
"Progress Note  [facility] Medicine    Admit Date: 10/12/2017    SUBJECTIVE:     Follow-up For:  Acute respiratory failure with hypoxia and hypercapnia      Interval history (See H&P for complete P,F,SHx) : Patient seen and examined.  Plan of care is reviewed with the patient, his son, and the bedside nurse in detail.  Overnight, the patient's blood pressure remained stable.  She does shows stable cardiac index of approximately 2.6.  Patient did have total of 2 L given yesterday with fluid responsiveness assessed.  Patient's O2 sats remained low, and he was on BiPAP all night.  Patient's blood gases this morning looked much improved on BiPAP, so patient was taken off BiPAP and placed on 2 L with O2 sat goal 88-92%.    Review of Systems:   Gen- Weakness/ Fatigue  Neuro- Confusion  Resp: Cough/ SOB    OBJECTIVE:     Vital Signs Range (Last 24H):  Temp:  [97.3 °F (36.3 °C)-98.6 °F (37 °C)]   Pulse:  []   Resp:  [12-32]   BP: ()/(53-90)   SpO2:  [85 %-100 %]     I & O (Last 24H):    Intake/Output Summary (Last 24 hours) at 10/15/17 2119  Last data filed at 10/15/17 1759   Gross per 24 hour   Intake             2160 ml   Output             1850 ml   Net              310 ml       Estimated body mass index is 16.51 kg/m² as calculated from the following:    Height as of this encounter: 5' 10" (1.778 m).    Weight as of this encounter: 52.2 kg (115 lb 1.3 oz).    Physical Exam:  General- Patient thin frail  man is in respiratory distress  HEENT- PERRLA, EOMI, OP clear, MMM  Neck- No JVD, Lymphadenopathy, Thyromegaly  CV- Regular rate and rhythm, No Murmur/johnson/rubs  Resp- Lungs with diffuse wheezing and rhonchi with wet crackles noted at the bilateral bases. Increased effort noted.  GI- Non tender/non-distended, BS normoactive x4 quads, no HSM  Extrem- No cyanosis, clubbing, edema. Pulses 2+ and symmetric  Neuro- Strength 5/5 flexors/extensors, DTRs 2+ and symmetric, Intact sensation to " light touch grossly  Skin-  No rashes, masses or lesions noted    Laboratory/Diagnostic Data:  Reviewed and noted in plan where applicable- Please see chart for full lab data.    Medications:  Medication list was reviewed and changes noted under Assessment/Plan.    ASSESSMENT/PLAN:     Active Problems:    Active Hospital Problems    Diagnosis  POA    *Acute hypoxemic respiratory failure [J96.01]- Patient's respiratory failure is likely multifactorial with combination of COPD, septic shock, CHF.  I do not believe the patient needs to be diuresed at the moment.  We will continue aggressive treatment for COPD, including steroids inhaled back beta agonists, and antibiotics.  Will monitor the patient closely in the ICU and continue BiPAP 2 hours on 2 hours off.  The patient has elected to not be intubated as part of his advanced directives, and we will respect that wish.  Yes    COPD exacerbation [J44.1]-  Severe, uncontrolled. Tx per above.  Yes    Hypomagnesemia, hypokalemia-   Magnesium reviewed-   Recent Labs  Lab 10/15/17  0331   MG 1.5*    Will replace electrolytes and continue to monitor closely.        HCAP (healthcare-associated pneumonia) [J18.9]-  Continue Broad spectrum ABX and assess/ monitor resp and blood cultures.  Yes    Acute systolic CHF (congestive heart failure) [I50.21]-   CHF currently controlled. Latest ECHO shows ejection fraction of   EF   Date Value Ref Range Status   10/13/2017 37 (A) 55 - 65    ]. Continue to hoold meds d/t hypotension and sepsis and monitor clinical status closely. Monitor on telemetry. Last BNP is     Recent Labs  Lab 10/15/17  0331   *   . Continue to stress to patient importance of self efficacy and  on diet for CHF.  Yes    Sepsis- Cultures are still in progress.  Will continue broad-spectrum antibiotic therapy for now for severe sepsis  Patient is not on pressors at the moment, and seems to have responded with fluids only.  Will monitor blood pressure  closely in the ICU.      Severe malnutrition [E43]- Nutrition consulted. Body mass index is 16.51 kg/m².. Encourage maximal PO intake. Diet supplementation ordered per nutrition approval. Will encourage PO and monitor closely for weight changes.   Yes    Long term anticoagulation-  Potentially d/t intracardiac thrombus. Currently on coumadin/lovenox bridge. Monitor INR and wean off if >2. ECHO reviewed- no more thrombus identified by TTE.  Yes    Rash-  On R hip. Ordered steroid cream.  Yes    HIV (human immunodeficiency virus infection) [B20]- Chronic problem, controlled. Will continue chronic medication(s), HAART and monitor for any changes, adjusting as needed.   Yes      Resolved Hospital Problems    Diagnosis Date Resolved POA   No resolved problems to display.     Disposition- Unsure, in ICU.    VTE Risk Mitigation     None          Critical care time spent on the evaluation and treatment of severe organ dysfunction, review of pertinent labs and imaging studies, discussions with consulting providers and discussions with patient/family 35 minutes

## 2017-10-16 NOTE — NURSING
Remains on Bipap.  Lethargic but easily aroused. Spoke with Dr Nolasco to update and inform patient now requiring bipap.  New orders received for pulmonology consult and cancel transfer order.

## 2017-10-16 NOTE — PLAN OF CARE
Problem: Patient Care Overview  Goal: Plan of Care Review  Outcome: Ongoing (interventions implemented as appropriate)  Patient aerosol tx got changed to 2.5mg ventolin and nacl now and q6hr.  Patient refused duoneb this am stating that it makes him to nervous.  Patient on Bipap at this time with ipap 15, epap 5, rate 14 and fi02 .4.

## 2017-10-17 PROBLEM — C34.32 CANCER OF LOWER LOBE OF LEFT LUNG: Status: ACTIVE | Noted: 2017-10-17

## 2017-10-17 LAB
ALBUMIN SERPL BCP-MCNC: 2.5 G/DL
ALP SERPL-CCNC: 74 U/L
ALT SERPL W/O P-5'-P-CCNC: 6 U/L
ANION GAP SERPL CALC-SCNC: 8 MMOL/L
AST SERPL-CCNC: 15 U/L
BACTERIA BLD CULT: NORMAL
BACTERIA BLD CULT: NORMAL
BACTERIA SPEC AEROBE CULT: NORMAL
BASOPHILS # BLD AUTO: 0 K/UL
BASOPHILS NFR BLD: 0.7 %
BILIRUB SERPL-MCNC: 0.3 MG/DL
BUN SERPL-MCNC: 11 MG/DL
CALCIUM SERPL-MCNC: 8.4 MG/DL
CHLORIDE SERPL-SCNC: 102 MMOL/L
CO2 SERPL-SCNC: 32 MMOL/L
CREAT SERPL-MCNC: 1 MG/DL
DIFFERENTIAL METHOD: ABNORMAL
EOSINOPHIL # BLD AUTO: 0.1 K/UL
EOSINOPHIL NFR BLD: 0.8 %
ERYTHROCYTE [DISTWIDTH] IN BLOOD BY AUTOMATED COUNT: 17.9 %
EST. GFR  (AFRICAN AMERICAN): >60 ML/MIN/1.73 M^2
EST. GFR  (NON AFRICAN AMERICAN): >60 ML/MIN/1.73 M^2
GLUCOSE SERPL-MCNC: 114 MG/DL
GRAM STN SPEC: NORMAL
HCT VFR BLD AUTO: 28.3 %
HGB BLD-MCNC: 8.9 G/DL
INR PPP: 1.1
LYMPHOCYTES # BLD AUTO: 1.5 K/UL
LYMPHOCYTES NFR BLD: 20.9 %
MAGNESIUM SERPL-MCNC: 1.9 MG/DL
MCH RBC QN AUTO: 31.4 PG
MCHC RBC AUTO-ENTMCNC: 31.3 G/DL
MCV RBC AUTO: 100 FL
MONOCYTES # BLD AUTO: 0.4 K/UL
MONOCYTES NFR BLD: 5.8 %
NEUTROPHILS # BLD AUTO: 5.1 K/UL
NEUTROPHILS NFR BLD: 71.8 %
PLATELET # BLD AUTO: 137 K/UL
PMV BLD AUTO: 8.3 FL
POTASSIUM SERPL-SCNC: 3.8 MMOL/L
PROT SERPL-MCNC: 5.9 G/DL
PROTHROMBIN TIME: 11.9 SEC
RBC # BLD AUTO: 2.83 M/UL
SODIUM SERPL-SCNC: 142 MMOL/L
WBC # BLD AUTO: 7.1 K/UL

## 2017-10-17 PROCEDURE — 25000003 PHARM REV CODE 250: Performed by: SPECIALIST

## 2017-10-17 PROCEDURE — 27000221 HC OXYGEN, UP TO 24 HOURS

## 2017-10-17 PROCEDURE — 63600175 PHARM REV CODE 636 W HCPCS: Performed by: INTERNAL MEDICINE

## 2017-10-17 PROCEDURE — 25000242 PHARM REV CODE 250 ALT 637 W/ HCPCS: Performed by: INTERNAL MEDICINE

## 2017-10-17 PROCEDURE — 25000003 PHARM REV CODE 250: Performed by: NURSE PRACTITIONER

## 2017-10-17 PROCEDURE — 25000003 PHARM REV CODE 250: Performed by: INTERNAL MEDICINE

## 2017-10-17 PROCEDURE — 94640 AIRWAY INHALATION TREATMENT: CPT

## 2017-10-17 PROCEDURE — 83735 ASSAY OF MAGNESIUM: CPT

## 2017-10-17 PROCEDURE — 85610 PROTHROMBIN TIME: CPT

## 2017-10-17 PROCEDURE — 11000001 HC ACUTE MED/SURG PRIVATE ROOM

## 2017-10-17 PROCEDURE — 80202 ASSAY OF VANCOMYCIN: CPT

## 2017-10-17 PROCEDURE — 63600175 PHARM REV CODE 636 W HCPCS: Performed by: HOSPITALIST

## 2017-10-17 PROCEDURE — 85025 COMPLETE CBC W/AUTO DIFF WBC: CPT

## 2017-10-17 PROCEDURE — 80053 COMPREHEN METABOLIC PANEL: CPT

## 2017-10-17 PROCEDURE — 25000003 PHARM REV CODE 250: Performed by: EMERGENCY MEDICINE

## 2017-10-17 PROCEDURE — 94761 N-INVAS EAR/PLS OXIMETRY MLT: CPT

## 2017-10-17 PROCEDURE — 99233 SBSQ HOSP IP/OBS HIGH 50: CPT | Mod: ,,, | Performed by: INTERNAL MEDICINE

## 2017-10-17 PROCEDURE — 99232 SBSQ HOSP IP/OBS MODERATE 35: CPT | Mod: ,,, | Performed by: INTERNAL MEDICINE

## 2017-10-17 PROCEDURE — 36415 COLL VENOUS BLD VENIPUNCTURE: CPT

## 2017-10-17 PROCEDURE — 25000003 PHARM REV CODE 250: Performed by: HOSPITALIST

## 2017-10-17 RX ORDER — ENOXAPARIN SODIUM 100 MG/ML
1 INJECTION SUBCUTANEOUS ONCE
Status: COMPLETED | OUTPATIENT
Start: 2017-10-17 | End: 2017-10-17

## 2017-10-17 RX ORDER — WARFARIN 2.5 MG/1
2.5 TABLET ORAL DAILY
Status: DISCONTINUED | OUTPATIENT
Start: 2017-10-17 | End: 2017-10-18

## 2017-10-17 RX ADMIN — EFAVIRENZ 600 MG: 600 TABLET, FILM COATED ORAL at 08:10

## 2017-10-17 RX ADMIN — PIPERACILLIN SODIUM AND TAZOBACTAM SODIUM 4.5 G: 4; .5 INJECTION, POWDER, LYOPHILIZED, FOR SOLUTION INTRAVENOUS at 09:10

## 2017-10-17 RX ADMIN — TENOFOVIR DISOPROXIL FUMARATE 300 MG: 300 TABLET, COATED ORAL at 08:10

## 2017-10-17 RX ADMIN — ALBUTEROL SULFATE 2.5 MG: 2.5 SOLUTION RESPIRATORY (INHALATION) at 12:10

## 2017-10-17 RX ADMIN — GABAPENTIN 900 MG: 300 CAPSULE ORAL at 09:10

## 2017-10-17 RX ADMIN — PANTOPRAZOLE SODIUM 40 MG: 40 TABLET, DELAYED RELEASE ORAL at 09:10

## 2017-10-17 RX ADMIN — ENOXAPARIN SODIUM 50 MG: 100 INJECTION SUBCUTANEOUS at 12:10

## 2017-10-17 RX ADMIN — HYDROCORTISONE: 1 CREAM TOPICAL at 09:10

## 2017-10-17 RX ADMIN — FLUOXETINE 40 MG: 20 CAPSULE ORAL at 08:10

## 2017-10-17 RX ADMIN — TOPIRAMATE 100 MG: 100 TABLET, FILM COATED ORAL at 08:10

## 2017-10-17 RX ADMIN — VALACYCLOVIR HYDROCHLORIDE 500 MG: 500 TABLET, FILM COATED ORAL at 08:10

## 2017-10-17 RX ADMIN — PIPERACILLIN SODIUM AND TAZOBACTAM SODIUM 4.5 G: 4; .5 INJECTION, POWDER, LYOPHILIZED, FOR SOLUTION INTRAVENOUS at 06:10

## 2017-10-17 RX ADMIN — GABAPENTIN 900 MG: 300 CAPSULE ORAL at 06:10

## 2017-10-17 RX ADMIN — PREDNISONE 60 MG: 20 TABLET ORAL at 09:10

## 2017-10-17 RX ADMIN — WARFARIN SODIUM 2.5 MG: 2.5 TABLET ORAL at 04:10

## 2017-10-17 RX ADMIN — ALBUTEROL SULFATE 2.5 MG: 2.5 SOLUTION RESPIRATORY (INHALATION) at 01:10

## 2017-10-17 RX ADMIN — PIPERACILLIN SODIUM AND TAZOBACTAM SODIUM 4.5 G: 4; .5 INJECTION, POWDER, LYOPHILIZED, FOR SOLUTION INTRAVENOUS at 03:10

## 2017-10-17 RX ADMIN — VALACYCLOVIR HYDROCHLORIDE 500 MG: 500 TABLET, FILM COATED ORAL at 09:10

## 2017-10-17 RX ADMIN — EMTRICITABINE 200 MG: 200 CAPSULE ORAL at 08:10

## 2017-10-17 RX ADMIN — FUROSEMIDE 20 MG: 20 TABLET ORAL at 09:10

## 2017-10-17 RX ADMIN — ALBUTEROL SULFATE 2.5 MG: 2.5 SOLUTION RESPIRATORY (INHALATION) at 07:10

## 2017-10-17 RX ADMIN — GABAPENTIN 900 MG: 300 CAPSULE ORAL at 03:10

## 2017-10-17 RX ADMIN — POTASSIUM & SODIUM PHOSPHATES POWDER PACK 280-160-250 MG 1 PACKET: 280-160-250 PACK at 06:10

## 2017-10-17 RX ADMIN — LISINOPRIL 2.5 MG: 2.5 TABLET ORAL at 09:10

## 2017-10-17 NOTE — CONSULTS
DATE OF CONSULTATION:  10/16/2017    HISTORY OF PRESENT ILLNESS:  Mr. Lerner is an elderly 72-year-old gentleman   who has a history of COPD.  He still smokes.  He comes into hospital with   increasing shortness of breath, wheezing and congestion.  He lives with his   nephew and he stated he has been feeling under the weather.  He coughs up clear   phlegm, nothing putrid, no wheezing or chest pain.  He just feels tight in his   chest.  He comes to the hospital with increasing infiltrate.  He also tells me   that he has lung cancer or spot and he has received radiation therapy within the   last 6 months.    PAST MEDICAL HISTORY:  Pertinent for arthritis asbestos exposure, COPD, CAD,   stenting, myocardial infarction, depression, HIV, hypertension, migraines,   restless legs, radiation to lung for lung mass.    FAMILY HISTORY:  Pertinent for COPD, cancer, stroke, heart disease.    SOCIAL HISTORY:  That of daily smoker.    REVIEW OF SYSTEMS:  Negative by 12-point review.    ALLERGIES:  He is allergic to ____ and aztreonam.    PHYSICAL EXAMINATION:  Comprehensive examination's pertinent findings show,  VITAL SIGNS:  Pulse of 96, respiratory rate 20, blood pressure 108/69,   saturation 98%.  HEENT:  Sclerae nonicteric.  Nares are patent.  Throat was not noninjected.  NECK:  No JVD.  Trachea is midline.  CHEST:  Reveals some slight rhonchi.  He has some mild posterior crackle.  HEART:  Without a gallop.  ABDOMEN:  Soft.  No rebound.  EXTREMITIES:  With mild ankle edema.  He has muscular atrophy.  NEUROLOGIC:  Cranial nerves are grossly intact.  His chest radiographs shows   marked deterioration from 4 months ago in June.  He has more infiltrate left.    DIAGNOSTIC DATA:  His last radiograph shows some clearing of the infiltrate on   the right.    LABORATORY DATA:  WBC is 7.1, hematocrit 27.7, platelets 149.  INR is 3.5.  CO2   is 35, creatinine 1.3, albumin is 2.5.  CRP is 31.7.  BNP is 268, pH 7.33, pCO2   of  65.7.    IMPRESSION:  1.  Chronic obstructive pulmonary disease.  2.  Chronic respiratory failure with hypercapnia.  3.  Pneumonia with abnormal chest x-ray.  4.  HIV.  5.  ASCVD with prior stenting.  6.  Myocardial infarction.  7.  Hypertension.  8.  Asbestos exposure.  9.  Migraines.  10.  Restless legs.    PLAN:  Continue with broad-spectrum antibiotic.  We will follow up chest   radiographs.  I discussed these advanced directives with the patient.  He does   not want to be placed on mechanical ventilator and wants to die with dignity.    He would not want us to break his chest in an event of respiratory arrest.  I   concur with his DNR status.  We will follow his clinical course.      HES/HN  dd: 10/16/2017 18:55:09 (CDT)  td: 10/17/2017 00:40:01 (CDT)  Doc ID   #[id number]  Job ID #[id number]    CC: Analilia Rosas M.D.

## 2017-10-17 NOTE — PROGRESS NOTES
10/17/17 0700   Patient Assessment/Suction   Level of Consciousness (AVPU) alert   All Lung Fields Breath Sounds clear;diminished   Cough Type none   PRE-TX-O2-ETCO2   O2 Device (Oxygen Therapy) nasal cannula   $ Is the patient on Oxygen? Yes   Flow (L/min) 4   SpO2 96 %   Pulse Oximetry Type Continuous   $ Pulse Oximetry - Multiple Charge Pulse Oximetry - Multiple   Pulse 75   Resp 16   /66   Aerosol Therapy   $ Aerosol Therapy Charges Aerosol Treatment   Respiratory Treatment Status given   SVN/Inhaler Treatment Route mask   Position During Treatment HOB at 30 degrees   Patient Tolerance good

## 2017-10-17 NOTE — PROGRESS NOTES
Transported to PCU via wc with O2. Tolerated well. NAD noted. Jaida VILLARREAL RN met at bs. Visitors present.

## 2017-10-17 NOTE — PLAN OF CARE
Problem: Patient Care Overview  Goal: Plan of Care Review  Outcome: Ongoing (interventions implemented as appropriate)  Discussed with patient treatments and goals for this shift, needs noted and adressed    Comments: Rested fair, did not require Bipap for oxygenation. Denied pain, safety maintained

## 2017-10-17 NOTE — PLAN OF CARE
Problem: Patient Care Overview  Goal: Plan of Care Review  Outcome: Ongoing (interventions implemented as appropriate)  More lethargic today.   Alternating periods of 4 L NC with periods on Bipap.  Pulls O2 off at times, desats to the low 80's.   Consult to pulmonology today.  Had two loose BM's.  Ortega intact, good urine output.  Safety maintained.

## 2017-10-17 NOTE — PROGRESS NOTES
Progress Note  Hospital Medicine  Patient Name:Boni Lerner  MRN:  [id number]  Patient Class: IP- Inpatient  Admit Date: 10/12/2017  Length of Stay: 4 days  Expected Discharge Date:   Attending Physician: Analilia Nolasco MD  Primary Care Provider:  Gilberto Rosas MD    SUBJECTIVE:     Principal Problem: Acute hypoxemic respiratory failure  Initial history of present illness:Mr. Lerner is a 72yo Wm with a PMH COPD, MI, HIV, and HTN. He presents to the hospital tonight with c/o worsening SOB and wheezing over 1 week. It is associated with chills, decreased appetite, and fatigue. He is not sure if he had fever or not. He is also having diarrhea and nausea without vomiting. He is on warfarin therapy, which he states that he has a blood clot in his heart, and his INR was found to be >10.0. Vitamin K was administered in the ED. He denies any bleeding. He currently denies any pain or discomforts.    PMH/PSH/SH/FH/Meds: reviewed.    Symptoms/Review of Systems: Patient reports continued shortness of breath and generalized weakness. No chest pain or headache, fever or abdominal pain.     Diet:  Adequate intake.    Activity level: Normal.    Pain:  Patient reports no pain.       OBJECTIVE:   Vital Signs (Most Recent):      Temp: 98.2 °F (36.8 °C) (10/17/17 0400)  Pulse: 75 (10/17/17 0700)  Resp: 16 (10/17/17 0700)  BP: 128/66 (10/17/17 0700)  SpO2: 96 % (10/17/17 0700)       Vital Signs Range (Last 24H):  Temp:  [98.1 °F (36.7 °C)-98.9 °F (37.2 °C)]   Pulse:  []   Resp:  [13-27]   BP: (100-141)/(55-76)   SpO2:  [84 %-100 %]     Weight: 52.2 kg (115 lb 1.3 oz)  Body mass index is 16.51 kg/m².    Intake/Output Summary (Last 24 hours) at 10/17/17 0950  Last data filed at 10/17/17 0608   Gross per 24 hour   Intake             2350 ml   Output             2150 ml   Net              200 ml     Physical Examination:  HENT:   Head: Normocephalic and atraumatic.   Eyes: Conjunctivae are normal. Pupils are equal, round, and  reactive to light.   Neck: Normal range of motion. Neck supple. No JVD present. No tracheal deviation present.   Cardiovascular: Normal rate, regular rhythm, normal heart sounds and intact distal pulses.    No murmur heard.  NSR on telemetry   Pulmonary/Chest:   Mildly tachypneic/ Decreased air movement throughout/ Wearing O2 via NC   Abdominal: Soft. Bowel sounds are normal. He exhibits no distension. There is no tenderness.   Musculoskeletal: Normal range of motion. He exhibits no edema.   Generalized weakness   Neurological: He is alert and oriented to person, place, and time. No cranial nerve deficit.   Skin: Skin is warm and dry. There is pallor.   Psychiatric: He has a normal mood and affect. His behavior is normal. Judgment and thought content normal.     CBC:    Recent Labs  Lab 10/15/17  0331 10/16/17  0321 10/17/17  0356   WBC 6.40 7.10 7.10   RBC 2.91* 2.76* 2.83*   HGB 9.3* 8.7* 8.9*   HCT 29.3* 27.7* 28.3*    149* 137*   * 100* 100*   MCH 31.9* 31.5* 31.4*   MCHC 31.8* 31.5* 31.3*   BMP    Recent Labs  Lab 10/15/17  0331 10/16/17  0321 10/17/17  0356   GLU 95 101 114*    144 142   K 3.1* 3.4* 3.8   CL 99 102 102   CO2 34* 35* 32*   BUN 12 13 11   CREATININE 1.0 1.3 1.0   CALCIUM 8.2* 8.4* 8.4*   MG 1.5* 1.9 1.9      Lab Results   Component Value Date    INR 1.1 10/17/2017    INR 3.5 (H) 10/16/2017    INR 3.0 (H) 10/15/2017     Diagnostic Results:  Microbiology Results (last 7 days)     Procedure Component Value Units Date/Time    Blood culture [[id number]] Collected:  10/14/17 1316    Order Status:  Completed Specimen:  Blood from Antecubital, Right  Hand Updated:  10/17/17 0612     Blood Culture, Routine No Growth to date     Blood Culture, Routine No Growth to date     Blood Culture, Routine No Growth to date    Narrative:       Aerobic and anaerobic    Blood culture [[id number]] Collected:  10/14/17 1338    Order Status:  Completed Specimen:  Blood from Antecubital, Right  Arm  Updated:  10/17/17 0612     Blood Culture, Routine No Growth to date     Blood Culture, Routine No Growth to date     Blood Culture, Routine No Growth to date    Narrative:       Aerobic and anaerobic    Blood Culture #2 [[id number]] Collected:  10/12/17 1726    Order Status:  Completed Specimen:  Blood Updated:  10/16/17 2212     Blood Culture, Routine No Growth to date     Blood Culture, Routine No Growth to date     Blood Culture, Routine No Growth to date     Blood Culture, Routine No Growth to date     Blood Culture, Routine No Growth to date    Blood Culture #1 [[id number]] Collected:  10/12/17 1715    Order Status:  Completed Specimen:  Blood Updated:  10/16/17 2212     Blood Culture, Routine No Growth to date     Blood Culture, Routine No Growth to date     Blood Culture, Routine No Growth to date     Blood Culture, Routine No Growth to date     Blood Culture, Routine No Growth to date    Culture, Respiratory with Gram Stain [[id number]] Collected:  10/14/17 1435    Order Status:  Completed Specimen:  Respiratory from Sputum, Expectorated Updated:  10/16/17 0945     Respiratory Culture Normal respiratory eden     Gram Stain (Respiratory) <10 epithelial cells per low power field.     Gram Stain (Respiratory) Rare WBC's     Gram Stain (Respiratory) Rare Gram negative rods     Gram Stain (Respiratory) Rare budding yeast    Urine culture [[id number]] Collected:  10/14/17 1449    Order Status:  Completed Specimen:  Urine from Urine, Catheterized Updated:  10/16/17 0940     Urine Culture, Routine No growth    Culture, Respiratory with Gram Stain [[id number]]     Order Status:  Canceled Specimen:  Respiratory            Chest X-Ray: Increase in bilateral infiltrates suggesting pulmonary edema.  Small left pleural effusion.    ECHO:    1 - Concentric hypertrophy.     2 - Wall motion abnormalities.     3 - Moderately depressed left ventricular systolic function (EF 35-40%).     4 - Impaired LV relaxation, normal LAP  (grade 1 diastolic dysfunction).     5 - Intermediate central venous pressure.     6 - Left pleural effusion.   '  CXR: Rotated radiograph. Infiltrate and atelectasis at the left mid and lower lung with left pleural effusion, unchanged. Possible mild decrease in infiltrate on the right.    CXR: Continued infiltrate and atelectasis with volume loss of the left lung. Atherosclerosis. Rotated radiograph with resolved infiltrate on the right    Assessment/Plan:      Acute Hypoxic respiratory failure  Chronic obstructive pulmonary disease with acute exacerbation  HCAP  O2 therapy. Use BiPAP, follow pulmonary recommendations.  Nebulizers  Steroids  Continue IV antibiotics.  Monitor for an improvement in condition     Acute Systolic CHF exacerbation  Supplemental O2 via nasal canula; titrate O2 saturation to >92%.  On IV Lasix 20 mg daily. Follow cardiology recommendations.  Daily weights.  Strict I/Os.  Fluid restriction.  Na restriction <2g/d.  Resume Coumadin. Since INR is low today, give Lovenox 1 mg/kg sq once.    Sepsis- Cultures are still in progress.  Will continue broad-spectrum antibiotic therapy for now for severe sepsis  Patient is not on pressors at the moment, and seems to have responded with fluids only.     Essential hypertension-Controlled  Chronic. Continue home BP medications and adjust as needed.     Tobacco abuse  Smoking cessation encouraged  Nicotine patch     HIV (human immunodeficiency virus infection)  Chronic. Continue home HIV medications.    Severe Malnutrition  Severe. Nutrition consulted. Encourage maximal PO intake. Diet supplementation ordered per nutrition approval. Will encourage PO and monitor closely for weight changes.    Transfer to floor.    VTE Risk Mitigation     On Coumadin        Analilia Nolasco MD  Department of Hospital Medicine   [facility]

## 2017-10-18 PROBLEM — J70.0 RADIATION PNEUMONITIS: Status: ACTIVE | Noted: 2017-10-18

## 2017-10-18 LAB
INR PPP: 1.1
MAGNESIUM SERPL-MCNC: 2 MG/DL
PROTHROMBIN TIME: 11.4 SEC
VANCOMYCIN TROUGH SERPL-MCNC: 17.8 UG/ML

## 2017-10-18 PROCEDURE — 36415 COLL VENOUS BLD VENIPUNCTURE: CPT

## 2017-10-18 PROCEDURE — 63600175 PHARM REV CODE 636 W HCPCS: Performed by: INTERNAL MEDICINE

## 2017-10-18 PROCEDURE — 97803 MED NUTRITION INDIV SUBSEQ: CPT

## 2017-10-18 PROCEDURE — 25000003 PHARM REV CODE 250: Performed by: EMERGENCY MEDICINE

## 2017-10-18 PROCEDURE — 25000003 PHARM REV CODE 250: Performed by: SPECIALIST

## 2017-10-18 PROCEDURE — 99232 SBSQ HOSP IP/OBS MODERATE 35: CPT | Mod: ,,, | Performed by: INTERNAL MEDICINE

## 2017-10-18 PROCEDURE — 25000003 PHARM REV CODE 250: Performed by: INTERNAL MEDICINE

## 2017-10-18 PROCEDURE — 94640 AIRWAY INHALATION TREATMENT: CPT

## 2017-10-18 PROCEDURE — 83735 ASSAY OF MAGNESIUM: CPT

## 2017-10-18 PROCEDURE — 25000242 PHARM REV CODE 250 ALT 637 W/ HCPCS: Performed by: INTERNAL MEDICINE

## 2017-10-18 PROCEDURE — 25000003 PHARM REV CODE 250: Performed by: NURSE PRACTITIONER

## 2017-10-18 PROCEDURE — 63600175 PHARM REV CODE 636 W HCPCS: Performed by: EMERGENCY MEDICINE

## 2017-10-18 PROCEDURE — 11000001 HC ACUTE MED/SURG PRIVATE ROOM

## 2017-10-18 PROCEDURE — 63600175 PHARM REV CODE 636 W HCPCS: Performed by: HOSPITALIST

## 2017-10-18 PROCEDURE — 25000003 PHARM REV CODE 250: Performed by: HOSPITALIST

## 2017-10-18 PROCEDURE — 27000221 HC OXYGEN, UP TO 24 HOURS

## 2017-10-18 PROCEDURE — 85610 PROTHROMBIN TIME: CPT

## 2017-10-18 PROCEDURE — 94761 N-INVAS EAR/PLS OXIMETRY MLT: CPT

## 2017-10-18 RX ORDER — WARFARIN SODIUM 5 MG/1
5 TABLET ORAL DAILY
Status: DISCONTINUED | OUTPATIENT
Start: 2017-10-18 | End: 2017-10-19 | Stop reason: HOSPADM

## 2017-10-18 RX ORDER — FLUCONAZOLE 2 MG/ML
100 INJECTION, SOLUTION INTRAVENOUS
Status: DISCONTINUED | OUTPATIENT
Start: 2017-10-18 | End: 2017-10-19 | Stop reason: HOSPADM

## 2017-10-18 RX ORDER — PREDNISONE 20 MG/1
40 TABLET ORAL DAILY
Status: DISCONTINUED | OUTPATIENT
Start: 2017-10-19 | End: 2017-10-18

## 2017-10-18 RX ORDER — LISINOPRIL 2.5 MG/1
5 TABLET ORAL DAILY
Status: DISCONTINUED | OUTPATIENT
Start: 2017-10-19 | End: 2017-10-19 | Stop reason: HOSPADM

## 2017-10-18 RX ADMIN — HYDROCORTISONE: 1 CREAM TOPICAL at 09:10

## 2017-10-18 RX ADMIN — PANTOPRAZOLE SODIUM 40 MG: 40 TABLET, DELAYED RELEASE ORAL at 10:10

## 2017-10-18 RX ADMIN — GABAPENTIN 900 MG: 300 CAPSULE ORAL at 05:10

## 2017-10-18 RX ADMIN — METHYLPREDNISOLONE SODIUM SUCCINATE 80 MG: 40 INJECTION, POWDER, FOR SOLUTION INTRAMUSCULAR; INTRAVENOUS at 10:10

## 2017-10-18 RX ADMIN — FLUCONAZOLE 100 MG: 2 INJECTION INTRAVENOUS at 12:10

## 2017-10-18 RX ADMIN — ALBUTEROL SULFATE 2.5 MG: 2.5 SOLUTION RESPIRATORY (INHALATION) at 12:10

## 2017-10-18 RX ADMIN — ALBUTEROL SULFATE 2.5 MG: 2.5 SOLUTION RESPIRATORY (INHALATION) at 07:10

## 2017-10-18 RX ADMIN — EMTRICITABINE 200 MG: 200 CAPSULE ORAL at 10:10

## 2017-10-18 RX ADMIN — PIPERACILLIN SODIUM AND TAZOBACTAM SODIUM 4.5 G: 4; .5 INJECTION, POWDER, LYOPHILIZED, FOR SOLUTION INTRAVENOUS at 02:10

## 2017-10-18 RX ADMIN — TOPIRAMATE 100 MG: 100 TABLET, FILM COATED ORAL at 10:10

## 2017-10-18 RX ADMIN — VALACYCLOVIR HYDROCHLORIDE 500 MG: 500 TABLET, FILM COATED ORAL at 10:10

## 2017-10-18 RX ADMIN — ALBUTEROL SULFATE 2.5 MG: 2.5 SOLUTION RESPIRATORY (INHALATION) at 01:10

## 2017-10-18 RX ADMIN — GABAPENTIN 900 MG: 300 CAPSULE ORAL at 10:10

## 2017-10-18 RX ADMIN — EFAVIRENZ 600 MG: 600 TABLET, FILM COATED ORAL at 10:10

## 2017-10-18 RX ADMIN — HYDROCORTISONE: 1 CREAM TOPICAL at 10:10

## 2017-10-18 RX ADMIN — LISINOPRIL 2.5 MG: 2.5 TABLET ORAL at 10:10

## 2017-10-18 RX ADMIN — FLUOXETINE 40 MG: 20 CAPSULE ORAL at 10:10

## 2017-10-18 RX ADMIN — FUROSEMIDE 20 MG: 20 TABLET ORAL at 10:10

## 2017-10-18 RX ADMIN — TENOFOVIR DISOPROXIL FUMARATE 300 MG: 300 TABLET, COATED ORAL at 10:10

## 2017-10-18 RX ADMIN — GABAPENTIN 900 MG: 300 CAPSULE ORAL at 02:10

## 2017-10-18 RX ADMIN — WARFARIN SODIUM 5 MG: 5 TABLET ORAL at 04:10

## 2017-10-18 RX ADMIN — PIPERACILLIN SODIUM AND TAZOBACTAM SODIUM 4.5 G: 4; .5 INJECTION, POWDER, LYOPHILIZED, FOR SOLUTION INTRAVENOUS at 10:10

## 2017-10-18 RX ADMIN — VANCOMYCIN HYDROCHLORIDE 750 MG: 500 INJECTION, POWDER, LYOPHILIZED, FOR SOLUTION INTRAVENOUS at 12:10

## 2017-10-18 RX ADMIN — PIPERACILLIN SODIUM AND TAZOBACTAM SODIUM 4.5 G: 4; .5 INJECTION, POWDER, LYOPHILIZED, FOR SOLUTION INTRAVENOUS at 05:10

## 2017-10-18 RX ADMIN — ONDANSETRON 4 MG: 2 INJECTION INTRAMUSCULAR; INTRAVENOUS at 05:10

## 2017-10-18 NOTE — PROGRESS NOTES
"[facility]  Adult Nutrition  Progress Note    SUMMARY     Recommendations  Recommendation/Intervention:   1.) Consider appetite stimulant   2.) Continue with light/GI soft diet  Goals: 1.) Patient will meet >80% of EEN within 96 hrs 2.) Patient will meet >75% EEN  Nutrition Goal Status: 1.) goal not met 2.) new  Communication of RD Recs: other (comment) (care plan)    1. COPD exacerbation    2. Dyspnea, unspecified type    3. Acute hypoxemic respiratory failure    4. Coronary artery disease, angina presence unspecified, unspecified vessel or lesion type, unspecified whether native or transplanted heart    5. CHF (congestive heart failure)    6. Acute respiratory failure with hypoxia and hypercapnia    7. Chronic obstructive pulmonary disease with (acute) exacerbation    8. Dyspnea, unspecified      Past Medical History:   Diagnosis Date    Arthritis     Asbestos exposure     COPD (chronic obstructive pulmonary disease)     O2 AT NITE PRN    Coronary artery disease     STENT X 1    Depression     HIV (human immunodeficiency virus infection)     Hypertension     NO MED NOW    Migraines     Myocardial infarction     Presence of dental bridge     UPPER AND LOWER    RLS (restless legs syndrome)     Wears glasses      Reason for Assessment  Reason for Assessment: RD follow-up  Diagnosis: pulmonary disease, cardiac disease  Relevent Medical History: HIV, Depression, HTN, CAD   Interdisciplinary Rounds: attended  General Information Comments: 10/12- Admits with wosening SOB x1 week. Patient laying in bed awake and alert. Patient depressed during Rd visit stating "I just want to go home and die". Severe muscle loss and moderate fat loss noted. Per intake on door,consumed ~70% of breakfast. Patient refusing all ONS with meals. Reports he has been eating poorly for months prior to intake. UBW ~150 lbs. 10/18- Pt alert during visit with improved mood from first consult. Pt still refuses to drink " ONS and per intake on door consumes ~100% of breakfast. Weight is stable.    Wt Readings from Last 1 Encounters:   10/18/17 1007 52.2 kg (115 lb 1.3 oz)   10/13/17 1228 52.2 kg (115 lb 1.3 oz)   10/13/17 0800 52.2 kg (115 lb)   10/12/17 1324 52.2 kg (115 lb)     Nutrition Prescription Ordered  Current Diet Order: Light/GI Soft    Evaluation of Received Nutrients/Fluid Intake  Energy Calories Required: not meeting needs  Protein Required: not meeting needs  IV Fluid (mL): 1400  % Intake of Estimated Energy Needs: 50 - 75 %  % Meal Intake: 50%     Nutrition Risk Screen  Nutrition Risk Screen: no indicators present    Nutrition/Diet History  Food Preferences: no cultural or Tenriism food preferences noted. NKFA.   Factors Affecting Nutritional Intake: decreased appetite    Labs/Tests/Procedures/Meds  Diagnostic Test/Procedure Review: reviewed, pertinent  Pertinent Labs Reviewed: reviewed, pertinent  Lab Results   Component Value Date     10/17/2017    K 3.8 10/17/2017     10/17/2017    CO2 32 (H) 10/17/2017     Lab Results   Component Value Date    CREATININE 1.0 10/17/2017    BUN 11 10/17/2017     10/17/2017    K 3.8 10/17/2017     10/17/2017    CO2 32 (H) 10/17/2017     Lab Results   Component Value Date    CHOL 136 06/07/2017    CHOL 158 04/06/2014     Lab Results   Component Value Date    HDL 41 06/07/2017    HDL 46 04/06/2014    HDL 43 07/22/2013     Lab Results   Component Value Date    LDLCALC 71.2 06/07/2017    LDLCALC 97.4 04/06/2014    LDLCALC 83 07/22/2013     Lab Results   Component Value Date    TRIG 119 06/07/2017    TRIG 73 04/06/2014     Lab Results   Component Value Date    CHOLHDL 30.1 06/07/2017    CHOLHDL 29.1 04/06/2014     No results for input(s): POCTGLUCOSE in the last 24 hours.  Lab Results   Component Value Date    HGBA1C 5.2 06/08/2017     Lab Results   Component Value Date    LDLCALC 71.2 06/07/2017    CREATININE 1.0 10/17/2017     Pertinent Medications Reviewed:  "reviewed  Scheduled Meds:   albuterol sulfate  2.5 mg Nebulization Q6H    efavirenz  600 mg Oral QHS    And    emtricitabine  200 mg Oral QHS    And    tenofovir  300 mg Oral QHS    fluconazole (DIFLUCAN) IVPB  100 mg Intravenous Q24H    fluoxetine  40 mg Oral Nightly    furosemide  20 mg Intravenous Once    furosemide  20 mg Oral Daily    gabapentin  900 mg Oral TID    hydrocortisone   Topical BID    lisinopril  2.5 mg Oral Daily    pantoprazole  40 mg Oral Daily    piperacillin-tazobactam 4.5 g in dextrose 5 % 100 mL IVPB (ready to mix system)  4.5 g Intravenous Q8H    [START ON 10/19/2017] predniSONE  40 mg Oral Daily    topiramate  100 mg Oral QHS    valacyclovir  500 mg Oral BID    vancomycin (VANCOCIN) IVPB  750 mg Intravenous Q24H    warfarin  5 mg Oral Daily     Physical Findings  Overall Physical Appearance: loss of muscle mass, loss of subcutaneous fat, on oxygen therapy  Oral/Mouth Cavity: tooth/teeth missing  Skin:  (Karl score 15)    Anthropometrics  Temp: 97.8 °F (36.6 °C)  Height: 5' 10"  Weight Method: Bed Scale  Weight: 52.2 kg (115 lb 1.3 oz)  Ideal Body Weight (IBW), Male: 166 lb  % Ideal Body Weight, Male (lb): 69.33 lb  BMI (Calculated): 16.5  BMI Grade: 16 - 16.9 protein-energy malnutrition grade II  Weight Loss: unintentional  Usual Body Weight (UBW), k.1 kg  % Usual Body Weight: 76.81  % Weight Change From Usual Weight: -23.35 %    Estimated/Assessed Needs  Weight Used For Calorie Calculations: 52.2 kg (115 lb 1.3 oz)   Height (cm): 177.8 cm  Energy Calorie Requirements (kcal):   Energy Need Method: Sharp-St Jeor, Kcal/kg (x1.5)  RMR (Sharp-St. Jeor Equation): 1278.25  Weight Used For Protein Calculations: 52.2 kg (115 lb 1.3 oz)  Protein Requirements: 1.2-1.5g/kg  1.2 gm Protein (gm): 62.77 and 1.5 gm Protein (gm): 78.46  Fluid Need Method: RDA Method (or per MD)  RDA Method (mL):   CHO Requirement: N/A     Assessment and Plan  Severe malnutrition    " Related to (etiology):   Decreased appetite     Signs and Symptoms (as evidenced by):   1.) EEN <75% >1 month   2.) 23% weight loss in 8 months   3.) Severe muscle loss: prominent protrusion of clavicle, scooping of temples  4.) Moderate fat loss: not ample supply in upper arm region    Interventions/Recommendations (treatment strategy):  Continue with diet, consider appetite stimulant     Nutrition Diagnosis Status:   Progressing            Monitor and Evaluation  Food and Nutrient Intake: energy intake, food and beverage intake  Food and Nutrient Adminstration: diet order  Anthropometric Measurements: weight, weight change, body mass index  Biochemical Data, Medical Tests and Procedures: electrolyte and renal panel, glucose/endocrine profile, lipid profile  Nutrition-Focused Physical Findings: overall appearance, skin    Nutrition Risk  Level of Risk:  (x2 weekly)    Nutrition Follow-Up  RD Follow-up?: Yes     Discharge Plan  CHARIS Miller, Dietetic Intern  I certify that I directed the dietetic intern in service delivery and guided them using my skilled judgment. As the cosigning dietitian, I have reviewed the dietetic interns documentation and am responsible for the treatment, assessment, and plan.    Chanell Victor, MS RDN LDN

## 2017-10-18 NOTE — PLAN OF CARE
Problem: Nutrition, Imbalanced: Inadequate Oral Intake (Adult)  Intervention: Promote/Optimize Nutrition  Recommendations  Recommendation/Intervention:   1.) Consider appetite stimulant   2.) Continue with light/GI soft diet  Goals: 1.) Patient will meet >80% of EEN within 96 hrs 2.) Patient will meet >75% EEN  Nutrition Goal Status: 1.) goal not met 2.) new  Communication of RD Recs: other (comment) (care plan)

## 2017-10-18 NOTE — CONSULTS
Boni Lerner [id number] is a 72 y.o. male who has been consulted for vancomycin dosing.    The patient has the following labs:     Date Creatinine (mg/dl)    BUN WBC Count   10/18/2017 Estimated Creatinine Clearance: 49.3 mL/min (based on SCr of 1 mg/dL). Lab Results   Component Value Date    BUN 11 10/17/2017     Lab Results   Component Value Date    WBC 7.10 10/17/2017        Current weight is 52.2 kg (115 lb 1.3 oz)    Pt is receiving vancomycin 750 mg every 24 hours.  Vancomycin trough from 10/17 at 2334 was 17.8 mg/dL.  Target trough range is 15-20 mg/dL.   Trough was drawn on time and anticipate it is therapeutic. Pharmacy will continue current dose.   A vancomycin trough has been ordered for 10/20 at 0000.      Patient will be followed by pharmacy for changes in renal function, toxicity, and efficacy.  Thank you for allowing us to participate in this patient's care.     Haley Marie

## 2017-10-18 NOTE — PLAN OF CARE
10/17/17 1935   Patient Assessment/Suction   Level of Consciousness (AVPU) alert   Respiratory Effort Normal;Unlabored   Expansion/Accessory Muscles/Retractions expansion symmetric;no retractions;no use of accessory muscles   All Lung Fields Breath Sounds clear;diminished   Cough Frequency infrequent   Cough Type nonproductive   PRE-TX-O2-ETCO2   O2 Device (Oxygen Therapy) nasal cannula   Flow (L/min) 4   Oxygen Concentration (%) 36   SpO2 95 %   Pulse Oximetry Type Intermittent   Pulse 105   Resp 16   Aerosol Therapy   $ Aerosol Therapy Charges Aerosol Treatment   Respiratory Treatment Status given   SVN/Inhaler Treatment Route mask;with air   Position During Treatment HOB at 45 degrees   Patient Tolerance good   Post-Treatment   Post-treatment Heart Rate (beats/min) 101   Post-treatment Resp Rate (breaths/min) 16   All Fields Breath Sounds unchanged   Ready to Wean/Extubation Screen   FIO2<=50 (chart decimal) 0.36

## 2017-10-18 NOTE — PT/OT/SLP PROGRESS
Physical Therapy      Boni Lerner  MRN: [id number]    Patient not seen today secondary to  (PT attempted- pt refused PT- pt encouraged AROME of bilateral LE's). Will follow-up 10-.    Kaelyn Bowen, PT

## 2017-10-18 NOTE — PROGRESS NOTES
Progress Note  Cardiology    Admit Date: 10/12/2017   LOS: 5 days     Follow-up For: cardiology    Scheduled Meds:   albuterol sulfate  2.5 mg Nebulization Q6H    efavirenz  600 mg Oral QHS    And    emtricitabine  200 mg Oral QHS    And    tenofovir  300 mg Oral QHS    fluconazole (DIFLUCAN) IVPB  100 mg Intravenous Q24H    fluoxetine  40 mg Oral Nightly    furosemide  20 mg Intravenous Once    furosemide  20 mg Oral Daily    gabapentin  900 mg Oral TID    hydrocortisone   Topical BID    [START ON 10/19/2017] lisinopril  5 mg Oral Daily    pantoprazole  40 mg Oral Daily    piperacillin-tazobactam 4.5 g in dextrose 5 % 100 mL IVPB (ready to mix system)  4.5 g Intravenous Q8H    [START ON 10/19/2017] predniSONE  40 mg Oral Daily    topiramate  100 mg Oral QHS    valacyclovir  500 mg Oral BID    vancomycin (VANCOCIN) IVPB  750 mg Intravenous Q24H    warfarin  5 mg Oral Daily     Continuous Infusions:   PRN Meds:acetaminophen, alprazolam, loperamide, magnesium oxide, magnesium oxide, morphine, ondansetron, potassium chloride 10%, potassium chloride 10%, potassium chloride 10%, ramelteon, senna-docusate 8.6-50 mg    Review of patient's allergies indicates:   Allergen Reactions    Unable to assess Other (See Comments)     REX (DRUG FOR HIV) TAKEN 1989 ALMOST PARALYZED LEGS    Aztreonam Nausea And Vomiting       SUBJECTIVE:     Interval History: Patient has no complaint of  Chest pain/SOB..          OBJECTIVE:     Vital Signs (Most Recent)  Temp: 97.6 °F (36.4 °C) (10/18/17 1135)  Pulse: 80 (10/18/17 1135)  Resp: 18 (10/18/17 1135)  BP: 137/66 (10/18/17 1135)  SpO2: 97 % (10/18/17 1135)    Vital Signs Range (Last 24H):  Temp:  [97.6 °F (36.4 °C)-97.9 °F (36.6 °C)]   Pulse:  []   Resp:  [16-22]   BP: (118-166)/(59-82)   SpO2:  [93 %-99 %]       Physical Exam:  Neck: no carotid bruit and no JVD  Lungs: diminished breath sounds bibasilar  Heart: regular rate and rhythm    Recent Results (from the  past 24 hour(s))   VANCOMYCIN, TROUGH before 3rd dose    Collection Time: 10/17/17 11:34 PM   Result Value Ref Range    Vancomycin-Trough 17.8 10.0 - 22.0 ug/mL   Magnesium    Collection Time: 10/18/17  4:51 AM   Result Value Ref Range    Magnesium 2.0 1.6 - 2.6 mg/dL   Protime-INR    Collection Time: 10/18/17  4:51 AM   Result Value Ref Range    Prothrombin Time 11.4 9.0 - 12.5 sec    INR 1.1 0.8 - 1.2       Diagnostic Results:  Labs: Reviewed    ASSESSMENT/PLAN:     improving    Plan: Continue Current.Increase lisinopril.

## 2017-10-18 NOTE — PLAN OF CARE
Problem: Patient Care Overview  Goal: Plan of Care Review  Outcome: Ongoing (interventions implemented as appropriate)  POC reviewed with pt, understanding verbalized. Antibiotics given per order. Ortega. Safety maintained. SR on tele. Will continue to monitor

## 2017-10-18 NOTE — PROGRESS NOTES
Progress Note  PULMONARY    Admit Date: 10/12/2017   10/18/2017      SUBJECTIVE:     Oct 17, has severe vol loss left lung, had mass lll on ct may 2016, seemed smaller in 8/2017 ct abd but has vol loss left , prior ct/cxr very severe copd/emphysema.    Pt completed xrt 6 months or so ago at [facility], progressively worse since- pt relates he is ready to die, doesn't want to go on.  He feels min better since hosp started.  Morphine given earlier ppt headache. No appetite    Oct 18, stagnant. [place] and weak. No pain.    PFSH and Allergies reviewed.    OBJECTIVE:     Vitals (Most recent):  Vitals:    10/18/17 1321   BP:    Pulse: 88   Resp: 18   Temp:        Vitals (24 hour range):  Temp:  [97.6 °F (36.4 °C)-97.9 °F (36.6 °C)]   Pulse:  []   Resp:  [16-22]   BP: (118-166)/(59-82)   SpO2:  [93 %-99 %]       Intake/Output Summary (Last 24 hours) at 10/18/17 1508  Last data filed at 10/18/17 0700   Gross per 24 hour   Intake              750 ml   Output             2200 ml   Net            -1450 ml          Physical Exam:  The patient's neuro status (alertness,orientation,cognitive function,motor skills,), pharyngeal exam (oral lesions, hygiene, abn dentition,), Neck (jvd,mass,thyroid,nodes in neck and above/below clavicle),RESPIRATORY(symmetry,effort,fremitus,percussion,auscultation),  Cor(rhythm,heart tones including gallops,perfusion,edema)ABD(distention,hepatic&splenomegaly,tenderness,masses), Skin(rash,cyanosis),Psyc(affect,judgement,).  Exam negative except for these pertinent findings:    Cachexia, sl breathless looking, decreased bs, no edema.    Radiographs reviewed: view by direct vision  Density and vol loss left lower lung  Results for orders placed during the hospital encounter of 10/12/17   X-Ray Chest 1 View    Narrative Calcification is noted in the aorta. The cardiac size is within normal limits although the patient is rotated to the left. The right lung now appears clear. There remains infiltrate and  atelectasis of the left mid and lower lung field with volume loss on the left.    Impression  Continued infiltrate and atelectasis with volume loss of the left lung. Atherosclerosis. Rotated radiograph with resolved infiltrate on the right      Electronically signed by: Mike Thompson MD  Date:     10/15/17  Time:    08:29    ]    Labs     No results for input(s): WBC, HGB, HCT, PLT, BAND, METAMYELOCYT, MYELOPCT, HGBA1C in the last 24 hours.    Recent Labs  Lab 10/18/17  0451   MG 2.0   INR 1.1   No results for input(s): PH, PCO2, PO2, HCO3 in the last 24 hours.  Microbiology Results (last 7 days)     Procedure Component Value Units Date/Time    Blood culture [[id number]] Collected:  10/14/17 1338    Order Status:  Completed Specimen:  Blood from Antecubital, Right  Arm Updated:  10/18/17 0612     Blood Culture, Routine No Growth to date     Blood Culture, Routine No Growth to date     Blood Culture, Routine No Growth to date     Blood Culture, Routine No Growth to date    Narrative:       Aerobic and anaerobic    Blood culture [[id number]] Collected:  10/14/17 1316    Order Status:  Completed Specimen:  Blood from Antecubital, Right  Hand Updated:  10/18/17 0612     Blood Culture, Routine No Growth to date     Blood Culture, Routine No Growth to date     Blood Culture, Routine No Growth to date     Blood Culture, Routine No Growth to date    Narrative:       Aerobic and anaerobic    Blood Culture #2 [[id number]] Collected:  10/12/17 1726    Order Status:  Completed Specimen:  Blood Updated:  10/17/17 2212     Blood Culture, Routine No growth after 5 days.    Blood Culture #1 [[id number]] Collected:  10/12/17 1715    Order Status:  Completed Specimen:  Blood Updated:  10/17/17 2212     Blood Culture, Routine No growth after 5 days.    Culture, Respiratory with Gram Stain [[id number]] Collected:  10/14/17 1435    Order Status:  Completed Specimen:  Respiratory from Sputum, Expectorated Updated:  10/17/17 1400      Respiratory Culture --     BASSAM ALBICANS  Many  Normal respiratory eden also present       Gram Stain (Respiratory) <10 epithelial cells per low power field.     Gram Stain (Respiratory) Rare WBC's     Gram Stain (Respiratory) Rare Gram negative rods     Gram Stain (Respiratory) Rare budding yeast    Urine culture [[id number]] Collected:  10/14/17 1449    Order Status:  Completed Specimen:  Urine from Urine, Catheterized Updated:  10/16/17 0940     Urine Culture, Routine No growth    Culture, Respiratory with Gram Stain [[id number]]     Order Status:  Canceled Specimen:  Respiratory           Impression:  Active Hospital Problems    Diagnosis  POA    *Acute hypoxemic respiratory failure [J96.01]  Yes    Radiation pneumonitis [J70.0]  Yes    Cancer of lower lobe of left lung [C34.32]  Yes    Severe sepsis [A41.9, R65.20]  No    COPD exacerbation [J44.1]  Yes    HCAP (healthcare-associated pneumonia) [J18.9]  Yes    Acute systolic CHF (congestive heart failure) [I50.21]  Yes    Severe malnutrition [E43]  Yes    Coronary artery disease [I25.10]  Yes    Chronic obstructive pulmonary disease with acute exacerbation [J44.1]  Yes    HIV (human immunodeficiency virus infection) [B20]  Yes      Resolved Hospital Problems    Diagnosis Date Resolved POA   No resolved problems to display.               Plan:   Oct 17 - ongoing rx reasonable but pt likely terminal. Hospice may be reasonable soon?  No new recs.    Oct 18, left lung still markedly abn, given recent xrt prior to illness will treat for xrt pneumonia.  Increase steroids to 80 q 8                                  .

## 2017-10-18 NOTE — PROGRESS NOTES
Progress Note  PULMONARY    Admit Date: 10/12/2017   10/17/2017      SUBJECTIVE:     Oct 17, has severe vol loss left lung, had mass lll on ct may 2016, seemed smaller in 8/2017 ct abd but has vol loss left , prior ct/cxr very severe copd/emphysema.    Pt completed xrt 6 months or so ago at [facility], progressively worse since- pt relates he is ready to die, doesn't want to go on.  He feels min better since hosp started.  Morphine given earlier ppt headache. No appetite    PFSH and Allergies reviewed.    OBJECTIVE:     Vitals (Most recent):  Vitals:    10/17/17 1935   BP:    Pulse: 105   Resp: 16   Temp:        Vitals (24 hour range):  Temp:  [97.7 °F (36.5 °C)-98.9 °F (37.2 °C)]   Pulse:  []   Resp:  [13-27]   BP: (100-166)/(55-76)   SpO2:  [90 %-100 %]       Intake/Output Summary (Last 24 hours) at 10/17/17 1948  Last data filed at 10/17/17 1800   Gross per 24 hour   Intake             2240 ml   Output             2350 ml   Net             -110 ml          Physical Exam:  The patient's neuro status (alertness,orientation,cognitive function,motor skills,), pharyngeal exam (oral lesions, hygiene, abn dentition,), Neck (jvd,mass,thyroid,nodes in neck and above/below clavicle),RESPIRATORY(symmetry,effort,fremitus,percussion,auscultation),  Cor(rhythm,heart tones including gallops,perfusion,edema)ABD(distention,hepatic&splenomegaly,tenderness,masses), Skin(rash,cyanosis),Psyc(affect,judgement,).  Exam negative except for these pertinent findings:    Cachexia, sl breathless looking, decreased bs, no edema.    Radiographs reviewed: view by direct vision  Density and vol loss left lower lung  Results for orders placed during the hospital encounter of 10/12/17   X-Ray Chest 1 View    Narrative Calcification is noted in the aorta. The cardiac size is within normal limits although the patient is rotated to the left. The right lung now appears clear. There remains infiltrate and atelectasis of the left mid and lower lung  field with volume loss on the left.    Impression  Continued infiltrate and atelectasis with volume loss of the left lung. Atherosclerosis. Rotated radiograph with resolved infiltrate on the right      Electronically signed by: Mike Thompson MD  Date:     10/15/17  Time:    08:29    ]    Labs       Recent Labs  Lab 10/17/17  0356   WBC 7.10   HGB 8.9*   HCT 28.3*   *     Recent Labs  Lab 10/17/17  0356 10/17/17  0713     --    K 3.8  --      --    CO2 32*  --    BUN 11  --    CREATININE 1.0  --    *  --    CALCIUM 8.4*  --    MG 1.9  --    AST 15  --    ALT 6*  --    ALKPHOS 74  --    BILITOT 0.3  --    PROT 5.9*  --    ALBUMIN 2.5*  --    INR  --  1.1   No results for input(s): PH, PCO2, PO2, HCO3 in the last 24 hours.  Microbiology Results (last 7 days)     Procedure Component Value Units Date/Time    Culture, Respiratory with Gram Stain [[id number]] Collected:  10/14/17 1435    Order Status:  Completed Specimen:  Respiratory from Sputum, Expectorated Updated:  10/17/17 1400     Respiratory Culture --     BASSAM ALBICANS  Many  Normal respiratory eden also present       Gram Stain (Respiratory) <10 epithelial cells per low power field.     Gram Stain (Respiratory) Rare WBC's     Gram Stain (Respiratory) Rare Gram negative rods     Gram Stain (Respiratory) Rare budding yeast    Blood culture [[id number]] Collected:  10/14/17 1316    Order Status:  Completed Specimen:  Blood from Antecubital, Right  Hand Updated:  10/17/17 0612     Blood Culture, Routine No Growth to date     Blood Culture, Routine No Growth to date     Blood Culture, Routine No Growth to date    Narrative:       Aerobic and anaerobic    Blood culture [[id number]] Collected:  10/14/17 1338    Order Status:  Completed Specimen:  Blood from Antecubital, Right  Arm Updated:  10/17/17 0612     Blood Culture, Routine No Growth to date     Blood Culture, Routine No Growth to date     Blood Culture, Routine No Growth to date     Narrative:       Aerobic and anaerobic    Blood Culture #2 [[id number]] Collected:  10/12/17 1726    Order Status:  Completed Specimen:  Blood Updated:  10/16/17 2212     Blood Culture, Routine No Growth to date     Blood Culture, Routine No Growth to date     Blood Culture, Routine No Growth to date     Blood Culture, Routine No Growth to date     Blood Culture, Routine No Growth to date    Blood Culture #1 [[id number]] Collected:  10/12/17 1715    Order Status:  Completed Specimen:  Blood Updated:  10/16/17 2212     Blood Culture, Routine No Growth to date     Blood Culture, Routine No Growth to date     Blood Culture, Routine No Growth to date     Blood Culture, Routine No Growth to date     Blood Culture, Routine No Growth to date    Urine culture [[id number]] Collected:  10/14/17 1449    Order Status:  Completed Specimen:  Urine from Urine, Catheterized Updated:  10/16/17 0940     Urine Culture, Routine No growth    Culture, Respiratory with Gram Stain [[id number]]     Order Status:  Canceled Specimen:  Respiratory           Impression:  Active Hospital Problems    Diagnosis  POA    *Acute hypoxemic respiratory failure [J96.01]  Yes    Severe sepsis [A41.9, R65.20]  No    COPD exacerbation [J44.1]  Yes    HCAP (healthcare-associated pneumonia) [J18.9]  Yes    Acute systolic CHF (congestive heart failure) [I50.21]  Yes    Severe malnutrition [E43]  Yes    Coronary artery disease [I25.10]  Yes    Chronic obstructive pulmonary disease with acute exacerbation [J44.1]  Yes    HIV (human immunodeficiency virus infection) [B20]  Yes      Resolved Hospital Problems    Diagnosis Date Resolved POA   No resolved problems to display.               Plan:   Oct 17 - ongoing rx reasonable but pt likely terminal. Hospice may be reasonable soon?  No new recs.                                      .

## 2017-10-18 NOTE — PLAN OF CARE
10/18/17 0728   Patient Assessment/Suction   Level of Consciousness (AVPU) alert   Respiratory Effort Normal;Unlabored   Expansion/Accessory Muscles/Retractions no retractions;no use of accessory muscles   All Lung Fields Breath Sounds clear;diminished   Rhythm/Pattern, Respiratory depth regular;pattern regular;unlabored   Cough Frequency infrequent   Cough Type good;nonproductive   PRE-TX-O2-ETCO2   O2 Device (Oxygen Therapy) nasal cannula   $ Is the patient on Oxygen? Yes   Flow (L/min) 4   Oxygen Concentration (%) 36   SpO2 99 %   Pulse Oximetry Type Intermittent   $ Pulse Oximetry - Multiple Charge Pulse Oximetry - Multiple   Pulse 88   Resp 18   Aerosol Therapy   $ Aerosol Therapy Charges Aerosol Treatment   Respiratory Treatment Status given   SVN/Inhaler Treatment Route mask;with air   Position During Treatment HOB at 45 degrees   Patient Tolerance good   Post-Treatment   Post-treatment Heart Rate (beats/min) 90   Post-treatment Resp Rate (breaths/min) 18   All Fields Breath Sounds unchanged       Aerosol treatments q6 with Albuterol. Patient tolerates well.

## 2017-10-18 NOTE — ASSESSMENT & PLAN NOTE
Related to (etiology):   Decreased appetite     Signs and Symptoms (as evidenced by):   1.) EEN <75% >1 month   2.) 23% weight loss in 8 months   3.) Severe muscle loss: prominent protrusion of clavicle, scooping of temples  4.) Moderate fat loss: not ample supply in upper arm region    Interventions/Recommendations (treatment strategy):  Continue with diet, consider appetite stimulant     Nutrition Diagnosis Status:   Continues

## 2017-10-19 VITALS
HEIGHT: 70 IN | TEMPERATURE: 98 F | HEART RATE: 86 BPM | WEIGHT: 115.06 LBS | SYSTOLIC BLOOD PRESSURE: 135 MMHG | BODY MASS INDEX: 16.47 KG/M2 | OXYGEN SATURATION: 95 % | DIASTOLIC BLOOD PRESSURE: 74 MMHG | RESPIRATION RATE: 20 BRPM

## 2017-10-19 PROBLEM — A41.9 SEVERE SEPSIS: Status: RESOLVED | Noted: 2017-10-14 | Resolved: 2017-10-19

## 2017-10-19 PROBLEM — J96.01 ACUTE HYPOXEMIC RESPIRATORY FAILURE: Status: RESOLVED | Noted: 2017-10-12 | Resolved: 2017-10-19

## 2017-10-19 PROBLEM — R65.20 SEVERE SEPSIS: Status: RESOLVED | Noted: 2017-10-14 | Resolved: 2017-10-19

## 2017-10-19 LAB
ALBUMIN SERPL BCP-MCNC: 2.5 G/DL
ALP SERPL-CCNC: 77 U/L
ALT SERPL W/O P-5'-P-CCNC: 8 U/L
ANION GAP SERPL CALC-SCNC: 7 MMOL/L
AST SERPL-CCNC: 14 U/L
BASOPHILS # BLD AUTO: 0 K/UL
BASOPHILS NFR BLD: 0.1 %
BILIRUB SERPL-MCNC: 0.2 MG/DL
BUN SERPL-MCNC: 20 MG/DL
CALCIUM SERPL-MCNC: 8.4 MG/DL
CHLORIDE SERPL-SCNC: 100 MMOL/L
CO2 SERPL-SCNC: 35 MMOL/L
CREAT SERPL-MCNC: 1.2 MG/DL
DIFFERENTIAL METHOD: ABNORMAL
EOSINOPHIL # BLD AUTO: 0 K/UL
EOSINOPHIL NFR BLD: 0.1 %
ERYTHROCYTE [DISTWIDTH] IN BLOOD BY AUTOMATED COUNT: 16.8 %
EST. GFR  (AFRICAN AMERICAN): >60 ML/MIN/1.73 M^2
EST. GFR  (NON AFRICAN AMERICAN): >60 ML/MIN/1.73 M^2
GLUCOSE SERPL-MCNC: 131 MG/DL
HCT VFR BLD AUTO: 30 %
HGB BLD-MCNC: 9.5 G/DL
INR PPP: 1.5
LYMPHOCYTES # BLD AUTO: 0.7 K/UL
LYMPHOCYTES NFR BLD: 7.5 %
MAGNESIUM SERPL-MCNC: 2 MG/DL
MCH RBC QN AUTO: 31.8 PG
MCHC RBC AUTO-ENTMCNC: 31.7 G/DL
MCV RBC AUTO: 100 FL
MONOCYTES # BLD AUTO: 0.1 K/UL
MONOCYTES NFR BLD: 0.9 %
NEUTROPHILS # BLD AUTO: 8 K/UL
NEUTROPHILS NFR BLD: 91.4 %
PHOSPHATE SERPL-MCNC: 2.6 MG/DL
PLATELET # BLD AUTO: 151 K/UL
PMV BLD AUTO: 7.9 FL
POTASSIUM SERPL-SCNC: 3.9 MMOL/L
PROT SERPL-MCNC: 5.9 G/DL
PROTHROMBIN TIME: 15.4 SEC
RBC # BLD AUTO: 2.99 M/UL
SODIUM SERPL-SCNC: 142 MMOL/L
WBC # BLD AUTO: 8.7 K/UL

## 2017-10-19 PROCEDURE — 25000003 PHARM REV CODE 250: Performed by: EMERGENCY MEDICINE

## 2017-10-19 PROCEDURE — 80053 COMPREHEN METABOLIC PANEL: CPT

## 2017-10-19 PROCEDURE — 63600175 PHARM REV CODE 636 W HCPCS: Performed by: INTERNAL MEDICINE

## 2017-10-19 PROCEDURE — 85610 PROTHROMBIN TIME: CPT

## 2017-10-19 PROCEDURE — 94640 AIRWAY INHALATION TREATMENT: CPT

## 2017-10-19 PROCEDURE — 36415 COLL VENOUS BLD VENIPUNCTURE: CPT

## 2017-10-19 PROCEDURE — 27000221 HC OXYGEN, UP TO 24 HOURS

## 2017-10-19 PROCEDURE — 25000242 PHARM REV CODE 250 ALT 637 W/ HCPCS: Performed by: INTERNAL MEDICINE

## 2017-10-19 PROCEDURE — 25000003 PHARM REV CODE 250: Performed by: INTERNAL MEDICINE

## 2017-10-19 PROCEDURE — 63600175 PHARM REV CODE 636 W HCPCS: Performed by: HOSPITALIST

## 2017-10-19 PROCEDURE — 99900035 HC TECH TIME PER 15 MIN (STAT)

## 2017-10-19 PROCEDURE — 83735 ASSAY OF MAGNESIUM: CPT

## 2017-10-19 PROCEDURE — 25000003 PHARM REV CODE 250: Performed by: SPECIALIST

## 2017-10-19 PROCEDURE — 94761 N-INVAS EAR/PLS OXIMETRY MLT: CPT

## 2017-10-19 PROCEDURE — 25000003 PHARM REV CODE 250: Performed by: HOSPITALIST

## 2017-10-19 PROCEDURE — 84100 ASSAY OF PHOSPHORUS: CPT

## 2017-10-19 PROCEDURE — 85025 COMPLETE CBC W/AUTO DIFF WBC: CPT

## 2017-10-19 RX ORDER — AMOXICILLIN 250 MG
1 CAPSULE ORAL 2 TIMES DAILY PRN
COMMUNITY
Start: 2017-10-19

## 2017-10-19 RX ORDER — PREDNISONE 20 MG/1
TABLET ORAL
Qty: 45 TABLET | Refills: 0 | Status: SHIPPED | OUTPATIENT
Start: 2017-10-19

## 2017-10-19 RX ORDER — LEVOFLOXACIN 500 MG/1
500 TABLET, FILM COATED ORAL DAILY
Qty: 10 TABLET | Refills: 0 | Status: ON HOLD | OUTPATIENT
Start: 2017-10-19 | End: 2017-10-31

## 2017-10-19 RX ORDER — LISINOPRIL 5 MG/1
5 TABLET ORAL DAILY
Qty: 30 TABLET | Refills: 0 | Status: SHIPPED | OUTPATIENT
Start: 2017-10-20 | End: 2018-10-20

## 2017-10-19 RX ORDER — AMOXICILLIN AND CLAVULANATE POTASSIUM 875; 125 MG/1; MG/1
1 TABLET, FILM COATED ORAL 2 TIMES DAILY
Qty: 20 TABLET | Refills: 0 | Status: ON HOLD | OUTPATIENT
Start: 2017-10-19 | End: 2017-10-31

## 2017-10-19 RX ORDER — ONDANSETRON 8 MG/1
8 TABLET, ORALLY DISINTEGRATING ORAL EVERY 8 HOURS PRN
Qty: 30 TABLET | Refills: 0 | Status: SHIPPED | OUTPATIENT
Start: 2017-10-19

## 2017-10-19 RX ORDER — HYDROCORTISONE 1 %
CREAM (GRAM) TOPICAL 2 TIMES DAILY
Qty: 15 G | Refills: 0 | Status: SHIPPED | OUTPATIENT
Start: 2017-10-19 | End: 2017-10-29

## 2017-10-19 RX ORDER — FUROSEMIDE 20 MG/1
20 TABLET ORAL DAILY
Qty: 30 TABLET | Refills: 0 | Status: SHIPPED | OUTPATIENT
Start: 2017-10-20 | End: 2018-10-20

## 2017-10-19 RX ORDER — FLUCONAZOLE 150 MG/1
150 TABLET ORAL DAILY
Qty: 7 TABLET | Refills: 0 | Status: SHIPPED | OUTPATIENT
Start: 2017-10-19

## 2017-10-19 RX ADMIN — ALBUTEROL SULFATE 2.5 MG: 2.5 SOLUTION RESPIRATORY (INHALATION) at 12:10

## 2017-10-19 RX ADMIN — VALACYCLOVIR HYDROCHLORIDE 500 MG: 500 TABLET, FILM COATED ORAL at 10:10

## 2017-10-19 RX ADMIN — PANTOPRAZOLE SODIUM 40 MG: 40 TABLET, DELAYED RELEASE ORAL at 09:10

## 2017-10-19 RX ADMIN — PIPERACILLIN SODIUM AND TAZOBACTAM SODIUM 4.5 G: 4; .5 INJECTION, POWDER, LYOPHILIZED, FOR SOLUTION INTRAVENOUS at 06:10

## 2017-10-19 RX ADMIN — FUROSEMIDE 20 MG: 20 TABLET ORAL at 10:10

## 2017-10-19 RX ADMIN — LISINOPRIL 5 MG: 2.5 TABLET ORAL at 09:10

## 2017-10-19 RX ADMIN — VANCOMYCIN HYDROCHLORIDE 750 MG: 500 INJECTION, POWDER, LYOPHILIZED, FOR SOLUTION INTRAVENOUS at 12:10

## 2017-10-19 RX ADMIN — GABAPENTIN 900 MG: 300 CAPSULE ORAL at 06:10

## 2017-10-19 RX ADMIN — METHYLPREDNISOLONE SODIUM SUCCINATE 80 MG: 40 INJECTION, POWDER, FOR SOLUTION INTRAMUSCULAR; INTRAVENOUS at 06:10

## 2017-10-19 NOTE — PROGRESS NOTES
"Progress Note  [facility]    Admit Date: 10/12/2017    SUBJECTIVE:     Follow-up For:  Acute hypoxemic respiratory failure      Interval history (See H&P for complete P,F,SHx) : Patient seen and examined.  Overnight, no acute events.  Patient's oxygenation and respiratory effort appear to be returned to his baseline.  He also was mentating back at his baseline.  Microbiology and cultures reviewed, with evidence of Candida in the patient's respiratory culture.  All the other cultures done remained negative.  Patient continues on antibiotics Zosyn and Vanco.      Assessment and plan-acute on chronic respiratory failure-likely secondary to an infectious process with concomitant COPD.  Patient appears to be doing well.  Reduce prednisone to 40 mg and continue IV antibiotics.    Pneumonia, yeast-patient with positive Candida noted in the sputum and respiratory culture.  Will start on fluconazole for 7 days.  I do not believe that this was his primary pathogen but I am obligated to treat at the moment.    HIV-patient is continued on his HAART therapy    Malnutrition    Hypertension    Chronic pain    Review of Systems:   Gen- Weakness/ Fatigue  Neuro- Confusion  Resp: Cough/ SOB    OBJECTIVE:     Vital Signs Range (Last 24H):  Temp:  [97.6 °F (36.4 °C)-97.8 °F (36.6 °C)]   Pulse:  [79-89]   Resp:  [16-18]   BP: (118-137)/(59-82)   SpO2:  [95 %-99 %]     I & O (Last 24H):    Intake/Output Summary (Last 24 hours) at 10/18/17 1938  Last data filed at 10/18/17 0700   Gross per 24 hour   Intake                0 ml   Output              850 ml   Net             -850 ml       Estimated body mass index is 16.51 kg/m² as calculated from the following:    Height as of this encounter: 5' 10" (1.778 m).    Weight as of this encounter: 52.2 kg (115 lb 1.3 oz).    Physical Exam:  General- Patient thin frail  man is in respiratory distress  HEENT- PERRLA, EOMI, OP clear, MMM  Neck- No JVD, " Lymphadenopathy, Thyromegaly  CV- Regular rate and rhythm, No Murmur/johnson/rubs  Resp- Lungs with diffuse wheezing and rhonchi with wet crackles noted at the bilateral bases. Increased effort noted.  GI- Non tender/non-distended, BS normoactive x4 quads, no HSM  Extrem- No cyanosis, clubbing, edema. Pulses 2+ and symmetric  Neuro- Strength 5/5 flexors/extensors, DTRs 2+ and symmetric, Intact sensation to light touch grossly  Skin-  No rashes, masses or lesions noted    Laboratory/Diagnostic Data:  Reviewed and noted in plan where applicable- Please see chart for full lab data.    Medications:  Medication list was reviewed and changes noted under Assessment/Plan.    ASSESSMENT/PLAN:     Active Problems:    Active Hospital Problems    Diagnosis  POA    *Acute hypoxemic respiratory failure [J96.01]- Acute on chronic respiratory failure-likely secondary to an infectious process with concomitant COPD.  Patient appears to be doing well.  Reduce prednisone to 40 mg and continue IV antibiotics.   Yes    COPD exacerbation [J44.1]-  Severe, uncontrolled. Tx per above.  Yes    Hypomagnesemia, hypokalemia-   Magnesium reviewed-     Recent Labs  Lab 10/17/17  0356 10/18/17  0451   MG 1.9 2.0    Will replace electrolytes and continue to monitor closely.        HCAP (healthcare-associated pneumonia) [J18.9]-  Patient with positive Candida noted in the sputum and respiratory culture.  Will start on fluconazole for 7 days.  I do not believe that this was his primary pathogen but I am obligated to treat at the moment.  Yes    Acute systolic CHF (congestive heart failure) [I50.21]-   CHF currently controlled. Latest ECHO shows ejection fraction of   EF   Date Value Ref Range Status   10/13/2017 37 (A) 55 - 65    ]. Continue BB and monitor clinical status closely. Monitor on telemetry. Last BNP is     Recent Labs  Lab 10/15/17  0331   *   . Continue to stress to patient importance of self efficacy and  on diet for  CHF.  Yes    Sepsis-   This patient does have evidence of infective focus  My overall impression is sepsis.  Antibiotics given-   Antibiotics     Start     Stop Route Frequency Ordered    10/16/17 2330  vancomycin (VANCOCIN) 750 mg in dextrose 5 % 250 mL IVPB      -- IV Every 24 hours (non-standard times) 10/16/17 1802    10/14/17 2215  piperacillin-tazobactam 4.5 g in dextrose 5 % 100 mL IVPB (ready to mix system)  (Beta-Lactam WITHOUT resistance or severe beta-lactam allergy)      10/21 2214 IV Every 8 hours (non-standard times) 10/14/17 1307          Latest lactate reviewed, they are-  No results for input(s): LACTATE in the last 24 hours.    Organ dysfunction indicated by Acute respiratory failure  Source- Lung      Severe malnutrition [E43]- Nutrition consulted. Body mass index is 16.51 kg/m².. Encourage maximal PO intake. Diet supplementation ordered per nutrition approval. Will encourage PO and monitor closely for weight changes.   Yes    Long term anticoagulation-  Potentially d/t intracardiac thrombus. Currently on coumadin/lovenox bridge. Monitor INR and wean off if >2. ECHO reviewed- no more thrombus identified by TTE.  Yes    Rash-  On R hip. Ordered steroid cream.  Yes    HIV (human immunodeficiency virus infection) [B20]- Chronic problem, controlled. Will continue chronic medication(s), HAART and monitor for any changes, adjusting as needed.   Yes      Resolved Hospital Problems    Diagnosis Date Resolved POA   No resolved problems to display.     Disposition- Home    VTE Risk Mitigation         Ordered     warfarin (COUMADIN) tablet 5 mg  Daily     Route:  Oral        10/18/17 1015

## 2017-10-19 NOTE — PROGRESS NOTES
SSC met with patient at bedside regarding home health.  Patient signed patient choice disclosure choosing [facility].  [facility] sent patient information to [facility] through [facility] for authorization and acceptance.ZAINAB Ulloa

## 2017-10-19 NOTE — PROGRESS NOTES
10/19/17 0721   PRE-TX-O2-ETCO2   O2 Device (Oxygen Therapy) nasal cannula w/ humidification   $ Is the patient on Oxygen? Yes   Flow (L/min) 4   Oxygen Concentration (%) 36   SpO2 97 %   Pulse Oximetry Type Intermittent   $ Pulse Oximetry - Multiple Charge Pulse Oximetry - Multiple   Pt refused albuterol treatment at this time.

## 2017-10-19 NOTE — PLAN OF CARE
10/18/17 1910   Patient Assessment/Suction   Level of Consciousness (AVPU) alert   Respiratory Effort Normal;Unlabored   Expansion/Accessory Muscles/Retractions expansion symmetric;no retractions;no use of accessory muscles   All Lung Fields Breath Sounds clear;diminished   Cough Type nonproductive   PRE-TX-O2-ETCO2   O2 Device (Oxygen Therapy) nasal cannula   Flow (L/min) 4   Oxygen Concentration (%) 36   SpO2 (!) 93 %   Pulse Oximetry Type Intermittent   Pulse 93   Resp 18   Aerosol Therapy   $ Aerosol Therapy Charges Aerosol Treatment   Respiratory Treatment Status given   SVN/Inhaler Treatment Route mask;with oxygen   Position During Treatment HOB at 45 degrees   Patient Tolerance good   Post-Treatment   Post-treatment Heart Rate (beats/min) 91   Post-treatment Resp Rate (breaths/min) 18   All Fields Breath Sounds unchanged   Ready to Wean/Extubation Screen   FIO2<=50 (chart decimal) 0.36

## 2017-10-20 ENCOUNTER — PATIENT OUTREACH (OUTPATIENT)
Dept: ADMINISTRATIVE | Facility: CLINIC | Age: 72
End: 2017-10-20

## 2017-10-20 LAB
BACTERIA BLD CULT: NORMAL
BACTERIA BLD CULT: NORMAL

## 2017-10-20 NOTE — HPI
Mr. Lerner is a 70yo Wm with a PMH COPD, MI, HIV, and HTN. He presents to the hospital tonight with c/o worsening SOB and wheezing over 1 week. It is associated with chills, decreased appetite, and fatigue. He is not sure if he had fever or not. He is also having diarrhea and nausea without vomiting. He is on warfarin therapy, which he states that he has a blood clot in his heart, and his INR was found to be >10.0. Vitamin K was administered in the ED. He denies any bleeding. He currently denies any pain or discomforts.

## 2017-10-20 NOTE — DISCHARGE SUMMARY
[facility] Hospital Medicine  Discharge Summary      Patient Name: Boni Lerner  MRN: [id number]  Admission Date: 10/12/2017  Hospital Length of Stay: 6 days  Discharge Date and Time: 10/19/2017  2:55 PM  Attending Physician: Dr. Nolasco   Discharging Provider: Kira Meier MD  Primary Care Provider: Gilberto Rosas MD      HPI:   Mr. Lerner is a 72yo Wm with a PMH COPD, MI, HIV, and HTN. He presents to the hospital tonight with c/o worsening SOB and wheezing over 1 week. It is associated with chills, decreased appetite, and fatigue. He is not sure if he had fever or not. He is also having diarrhea and nausea without vomiting. He is on warfarin therapy, which he states that he has a blood clot in his heart, and his INR was found to be >10.0. Vitamin K was administered in the ED. He denies any bleeding. He currently denies any pain or discomforts.       Indwelling Lines/Drains at time of discharge:   Lines/Drains/Airways          No matching active lines, drains, or airways        Hospital Course:   He was treated with IV abx, IV lasix and oxygen for his acute symptoms. Pulmonology and cardiology was consulted for evaluation. He required transfer to the ICU for respiratory failure. He accepted bipap but declined intubation. He requested to be DNR. He was also treated with IV steroids. He gradually improved daily with aggressive treatment. He was given lovenox and coumadin for a thrombus seen on echo previously. This was not seen with echo this admission. He declined hospice as he does not want to be restricted from following up with his individual doctors. Patient examined at bedside on day of discharge. Exam findings were within acceptable limits. He reached maximal hospitalization and was deemed safe for discharge.       Consults:   Consults         Status Ordering Provider     Inpatient consult to Pulmonology  Once     Provider:  Stoney Carranza MD    Completed CARMEN NOLASCO     IP  consult to dietary  Once     Provider:  (Not yet assigned)    Completed CARMEN SOUSA        Significant Diagnostic Studies: Labs:   INR   Lab Results   Component Value Date    INR 1.5 (H) 10/19/2017    INR 1.1 10/18/2017    INR 1.1 10/17/2017       Pending Diagnostic Studies:     Procedure Component Value Units Date/Time    EKG 12-lead [[id number]]     Order Status:  Sent Lab Status:  No result         Final Active Diagnoses:    Diagnosis Date Noted POA    PRINCIPAL PROBLEM:  HCAP (healthcare-associated pneumonia) [J18.9] 10/12/2017 Yes    Radiation pneumonitis [J70.0] 10/18/2017 Yes    Acute systolic CHF (congestive heart failure) [I50.21] 10/12/2017 Yes    Severe malnutrition [E43] 08/29/2017 Yes    Long term current use of anticoagulant therapy [Z79.01] 01/04/2017 Not Applicable    Coronary artery disease [I25.10] 03/02/2016 Yes    Chronic obstructive pulmonary disease with acute exacerbation [J44.1] 03/02/2016 Yes    HIV (human immunodeficiency virus infection) [B20] 10/16/2013 Yes      Problems Resolved During this Admission:    Diagnosis Date Noted Date Resolved POA    Severe sepsis [A41.9, R65.20] 10/14/2017 10/19/2017 No    Acute hypoxemic respiratory failure [J96.01] 10/12/2017 10/19/2017 Yes      * HCAP (healthcare-associated pneumonia)    Patient with positive Candida noted in the sputum and respiratory culture.  Continue fluconazole for 7 day tx.           Radiation pneumonitis    Continue steroids and abx        Acute systolic CHF (congestive heart failure)    Stable. Continue chronic meds.        Severe malnutrition    Maximize PO intake        Long term current use of anticoagulant therapy    Continue chronic coumadin        Coronary artery disease    Chronic. Continue home meds.           Chronic obstructive pulmonary disease with acute exacerbation    Stable. Continue nebs, supplemental O2 and abx.   Follow up with pulm          HIV (human immunodeficiency virus infection)    Chronic.  Continue home meds.         Discharged Condition: stable    Disposition: Home or Self Care    Follow Up:  Follow-up Information     Gilberto Rosas MD On 10/26/2017.    Specialty:  Family Medicine  Why:  @9:45am hosp f/u, INR  Contact information:  [place] [address]  [phone]             [facility]    Specialty:  Home Health Services  Why:  Home Health  Contact information:  [address] [facility] [address]  [place] [address]  [phone]               Patient Instructions:     Ambulatory referral to Home Health   Referral Priority: Routine Referral Type: Home Health   Referral Reason: Specialty Services Required    Requested Specialty: Home Health Services    Number of Visits Requested: 1      Diet Cardiac     Activity as tolerated       Medications:  Reconciled Home Medications:   Discharge Medication List as of 10/19/2017  2:51 PM      START taking these medications    Details   amoxicillin-clavulanate 875-125mg (AUGMENTIN) 875-125 mg per tablet Take 1 tablet by mouth 2 (two) times daily., Starting Thu 10/19/2017, Until Sun 10/29/2017, Normal      fluconazole (DIFLUCAN) 150 MG Tab Take 1 tablet (150 mg total) by mouth once daily., Starting Thu 10/19/2017, Normal      furosemide (LASIX) 20 MG tablet Take 1 tablet (20 mg total) by mouth once daily., Starting Fri 10/20/2017, Until Sat 10/20/2018, Normal      hydrocortisone 1 % cream Apply topically 2 (two) times daily. To hip rash, Starting Thu 10/19/2017, Until Sun 10/29/2017, Normal      levoFLOXacin (LEVAQUIN) 500 MG tablet Take 1 tablet (500 mg total) by mouth once daily., Starting Thu 10/19/2017, Until Sun 10/29/2017, Normal      lisinopril (PRINIVIL,ZESTRIL) 5 MG tablet Take 1 tablet (5 mg total) by mouth once daily., Starting Fri 10/20/2017, Until Sat 10/20/2018, Normal      ondansetron (ZOFRAN-ODT) 8 MG TbDL Take 1 tablet (8 mg total) by mouth every 8 (eight) hours as needed (nausea)., Starting Thu 10/19/2017, Normal      predniSONE  (DELTASONE) 20 MG tablet Take 3 pills daily for 7 days, then 2 pills daily for 7 days, then 1 pill daily for 7 days, then 1/2 pill daily for 4 days., Normal      senna-docusate 8.6-50 mg (PERICOLACE) 8.6-50 mg per tablet Take 1 tablet by mouth 2 (two) times daily as needed for Constipation., Starting u 10/19/2017, OTC         CONTINUE these medications which have NOT CHANGED    Details   acetaminophen (TYLENOL) 500 MG tablet Take 500 mg by mouth every 6 (six) hours as needed. 2 TABS, Until Discontinued, Historical Med      albuterol (PROAIR HFA) 90 mcg/actuation inhaler Inhale 2 puffs into the lungs every 6 (six) hours as needed for Wheezing or Shortness of Breath. Rescue, Historical Med      albuterol-ipratropium 2.5mg-0.5mg/3mL (DUO-NEB) 0.5 mg-3 mg(2.5 mg base)/3 mL nebulizer solution Take 3 mLs by nebulization every 6 (six) hours as needed for Wheezing., Starting Wed 6/3/2015, Until Fri 10/12/2018, Print      budesonide-formoterol 160-4.5 mcg (SYMBICORT) 160-4.5 mcg/actuation HFAA Inhale 2 puffs into the lungs daily as needed. , Historical Med      cholestyramine (QUESTRAN) 4 gram packet Take 4 g by mouth 2 (two) times daily., Historical Med      efavirenz-emtrictabine-tenofovir 600-200-300 mg (ATRIPLA) 600-200-300 mg Tab Take 1 tablet by mouth every evening. , Until Discontinued, Historical Med      fluoxetine (PROZAC) 40 MG capsule Take 40 mg by mouth nightly. , Until Discontinued, Historical Med      gabapentin (NEURONTIN) 800 MG tablet Take 900 mg by mouth 3 (three) times daily. , Until Discontinued, Historical Med      lactobacillus acidophilus & bulgar (LACTINEX) 100 million cell packet Take 1 packet (1 each total) by mouth 2 (two) times daily., Starting Wed 8/30/2017, Print      metoprolol tartrate (LOPRESSOR) 25 MG tablet 12.5 mg 2 (two) times daily. , Starting 2/7/2015, Until Discontinued, Historical Med      morphine (MSIR) 15 MG tablet Take 15 mg by mouth every 2 (two) hours as needed for Pain.,  Until Discontinued, Historical Med      nitroGLYCERIN (NITROSTAT) 0.3 MG SL tablet Place 0.3 mg under the tongue every 5 (five) minutes as needed for Chest pain., Until Discontinued, Historical Med      tiotropium (SPIRIVA) 18 mcg inhalation capsule Inhale 18 mcg into the lungs once daily., Until Discontinued, Historical Med      topiramate (TOPAMAX) 50 MG tablet Take 100 mg by mouth every evening. 2 TABS HS, Historical Med      tramadol (ULTRAM) 50 mg tablet Take 50 mg by mouth every 6 (six) hours as needed for Pain., Until Discontinued, Historical Med      valacyclovir (VALTREX) 500 MG tablet Take 500 mg by mouth 2 (two) times daily., Until Discontinued, Historical Med      warfarin (COUMADIN) 2.5 MG tablet Take 1 tablet (2.5 mg total) by mouth Daily., Starting Fri 9/1/2017, No Print         STOP taking these medications       azithromycin (ZITHROMAX) 500 MG tablet Comments:   Reason for Stopping:         cephALEXin (KEFLEX) 500 MG capsule Comments:   Reason for Stopping:         ondansetron (ZOFRAN) 8 MG tablet Comments:   Reason for Stopping:           Time spent on the discharge of patient: 38 minutes      Kira Meier MD  Department of Hospital Medicine  [facility]

## 2017-10-20 NOTE — HOSPITAL COURSE
He was treated with IV abx, IV lasix and oxygen for his acute symptoms. Pulmonology and cardiology was consulted for evaluation. He required transfer to the ICU for respiratory failure. He accepted bipap but declined intubation. He requested to be DNR. He was also treated with IV steroids. He gradually improved daily with aggressive treatment. He was given lovenox and coumadin for a thrombus seen on echo previously. This was not seen with echo this admission. He declined hospice as he does not want to be restricted from following up with his individual doctors. Patient examined at bedside on day of discharge. Exam findings were within acceptable limits. He reached maximal hospitalization and was deemed safe for discharge.

## 2017-10-20 NOTE — ASSESSMENT & PLAN NOTE
Patient with positive Candida noted in the sputum and respiratory culture.  Continue fluconazole for 7 day tx.

## 2017-10-22 ENCOUNTER — HOSPITAL ENCOUNTER (EMERGENCY)
Facility: HOSPITAL | Age: 72
Discharge: HOME | End: 2017-10-22
Attending: EMERGENCY MEDICINE
Payer: MEDICARE

## 2017-10-22 VITALS
HEIGHT: 70 IN | RESPIRATION RATE: 16 BRPM | TEMPERATURE: 98 F | WEIGHT: 115 LBS | OXYGEN SATURATION: 99 % | BODY MASS INDEX: 16.46 KG/M2 | SYSTOLIC BLOOD PRESSURE: 144 MMHG | HEART RATE: 82 BPM | DIASTOLIC BLOOD PRESSURE: 70 MMHG

## 2017-10-22 DIAGNOSIS — J44.1 COPD EXACERBATION: Primary | ICD-10-CM

## 2017-10-22 DIAGNOSIS — K21.9 GASTROESOPHAGEAL REFLUX DISEASE, ESOPHAGITIS PRESENCE NOT SPECIFIED: ICD-10-CM

## 2017-10-22 DIAGNOSIS — J18.9 HCAP (HEALTHCARE-ASSOCIATED PNEUMONIA): ICD-10-CM

## 2017-10-22 LAB
ALBUMIN SERPL BCP-MCNC: 2.7 G/DL
ALP SERPL-CCNC: 80 U/L
ALT SERPL W/O P-5'-P-CCNC: 8 U/L
ANION GAP SERPL CALC-SCNC: 7 MMOL/L
AST SERPL-CCNC: 13 U/L
BASOPHILS # BLD AUTO: 0.1 K/UL
BASOPHILS NFR BLD: 0.7 %
BILIRUB SERPL-MCNC: 0.1 MG/DL
BILIRUB UR QL STRIP: NEGATIVE
BNP SERPL-MCNC: 535 PG/ML
BUN SERPL-MCNC: 25 MG/DL
CALCIUM SERPL-MCNC: 8.6 MG/DL
CHLORIDE SERPL-SCNC: 102 MMOL/L
CLARITY UR: CLEAR
CO2 SERPL-SCNC: 37 MMOL/L
COLOR UR: YELLOW
CREAT SERPL-MCNC: 0.9 MG/DL
DIFFERENTIAL METHOD: ABNORMAL
EOSINOPHIL # BLD AUTO: 0.1 K/UL
EOSINOPHIL NFR BLD: 1 %
ERYTHROCYTE [DISTWIDTH] IN BLOOD BY AUTOMATED COUNT: 17.8 %
EST. GFR  (AFRICAN AMERICAN): >60 ML/MIN/1.73 M^2
EST. GFR  (NON AFRICAN AMERICAN): >60 ML/MIN/1.73 M^2
GLUCOSE SERPL-MCNC: 118 MG/DL
GLUCOSE UR QL STRIP: NEGATIVE
HCT VFR BLD AUTO: 35.3 %
HGB BLD-MCNC: 10.9 G/DL
HGB UR QL STRIP: ABNORMAL
INR PPP: 4.5
KETONES UR QL STRIP: NEGATIVE
LEUKOCYTE ESTERASE UR QL STRIP: NEGATIVE
LYMPHOCYTES # BLD AUTO: 2.1 K/UL
LYMPHOCYTES NFR BLD: 14.8 %
MCH RBC QN AUTO: 31.6 PG
MCHC RBC AUTO-ENTMCNC: 30.9 G/DL
MCV RBC AUTO: 102 FL
MONOCYTES # BLD AUTO: 0.9 K/UL
MONOCYTES NFR BLD: 6.3 %
NEUTROPHILS # BLD AUTO: 11 K/UL
NEUTROPHILS NFR BLD: 77.2 %
NITRITE UR QL STRIP: NEGATIVE
PH UR STRIP: 7 [PH] (ref 5–8)
PLATELET # BLD AUTO: 207 K/UL
PLATELET BLD QL SMEAR: ABNORMAL
PMV BLD AUTO: 7.5 FL
POTASSIUM SERPL-SCNC: 3.4 MMOL/L
PROT SERPL-MCNC: 6.3 G/DL
PROT UR QL STRIP: NEGATIVE
PROTHROMBIN TIME: 44.2 SEC
RBC # BLD AUTO: 3.45 M/UL
SODIUM SERPL-SCNC: 146 MMOL/L
SP GR UR STRIP: 1.02 (ref 1–1.03)
URN SPEC COLLECT METH UR: ABNORMAL
UROBILINOGEN UR STRIP-ACNC: NEGATIVE EU/DL
WBC # BLD AUTO: 14.2 K/UL

## 2017-10-22 PROCEDURE — 63600175 PHARM REV CODE 636 W HCPCS: Performed by: EMERGENCY MEDICINE

## 2017-10-22 PROCEDURE — 83880 ASSAY OF NATRIURETIC PEPTIDE: CPT

## 2017-10-22 PROCEDURE — 93005 ELECTROCARDIOGRAM TRACING: CPT

## 2017-10-22 PROCEDURE — 96374 THER/PROPH/DIAG INJ IV PUSH: CPT

## 2017-10-22 PROCEDURE — 81003 URINALYSIS AUTO W/O SCOPE: CPT

## 2017-10-22 PROCEDURE — 25000242 PHARM REV CODE 250 ALT 637 W/ HCPCS: Performed by: EMERGENCY MEDICINE

## 2017-10-22 PROCEDURE — 80053 COMPREHEN METABOLIC PANEL: CPT

## 2017-10-22 PROCEDURE — 99285 EMERGENCY DEPT VISIT HI MDM: CPT | Mod: 25

## 2017-10-22 PROCEDURE — 36415 COLL VENOUS BLD VENIPUNCTURE: CPT

## 2017-10-22 PROCEDURE — 85025 COMPLETE CBC W/AUTO DIFF WBC: CPT

## 2017-10-22 PROCEDURE — 94640 AIRWAY INHALATION TREATMENT: CPT

## 2017-10-22 PROCEDURE — 85610 PROTHROMBIN TIME: CPT

## 2017-10-22 RX ORDER — SUCRALFATE 1 G/1
1 TABLET ORAL 4 TIMES DAILY
Qty: 20 TABLET | Refills: 0 | Status: SHIPPED | OUTPATIENT
Start: 2017-10-22

## 2017-10-22 RX ORDER — ALBUTEROL SULFATE 2.5 MG/.5ML
2.5 SOLUTION RESPIRATORY (INHALATION)
Status: COMPLETED | OUTPATIENT
Start: 2017-10-22 | End: 2017-10-22

## 2017-10-22 RX ORDER — PANTOPRAZOLE SODIUM 20 MG/1
20 TABLET, DELAYED RELEASE ORAL DAILY
Qty: 30 TABLET | Refills: 0 | Status: SHIPPED | OUTPATIENT
Start: 2017-10-22 | End: 2018-10-22

## 2017-10-22 RX ORDER — ONDANSETRON 2 MG/ML
4 INJECTION INTRAMUSCULAR; INTRAVENOUS
Status: COMPLETED | OUTPATIENT
Start: 2017-10-22 | End: 2017-10-22

## 2017-10-22 RX ADMIN — ALBUTEROL SULFATE 2.5 MG: 2.5 SOLUTION RESPIRATORY (INHALATION) at 12:10

## 2017-10-22 RX ADMIN — ONDANSETRON 4 MG: 2 INJECTION INTRAMUSCULAR; INTRAVENOUS at 01:10

## 2017-10-22 NOTE — ED NOTES
Had episode of sob at home this am with temporary cyanosis, per EMS. Now a, a, o, w&d, color good, O2 sat 98% on 4L/NC. Talks in short sentences. Bbs decreased throughout, abdomen soft with bowel sounds. Cleaned soft brow stool from patient and clean brief applied. No pedal edema noted, palp DP pulses.

## 2017-10-22 NOTE — ED PROVIDER NOTES
"Encounter Date: 10/22/2017    SCRIBE #1 NOTE: I, Michaela Ibanez, am scribing for, and in the presence of,  Dr. Anaya. I have scribed the entire note.       History     Chief Complaint   Patient presents with    Shortness of Breath    Lung Cancer       10/22/2017 10:21 AM     Chief complaint: Shortness of breath       Boni Lerner is a 72 y.o. male with a history of lung cancer, CAD, myocardial infarction, HTN, COPD, HIV, migraines, and depression who presents to the ED via EMS with an onset of worsening shortness of breath with associated coughing that began this morning when he woke up at around 7 AM. Patient reports being "in and out of sleep" and feeling "short-winded," so he told his nephew to call an ambulance. Per EMS, his oxygen saturation was 60% and he was cyanotic with labored breathing when they arrived. Patient denies having a fever, but notes having "shakes and tremors" since this morning. The patient is unsure what is causing his shaking but believes it is one of his "new medications." The patient was seen and admitted on 10/12/2017 for healthcare associated PNA, COPD exacerbation, dyspnea, acute hypoxemic respiratory failure, CHF, and acute respiratory failure with hypoxia and hypercapnia. He was treated with IV antibiotics, IV lasix and oxygen for his acute symptoms. Also, he was treated with IV steroids. Patient was discharged on 10/19/2017, 3 days ago. He uses oxygen at home "all of the time," and he is a current smoker who smokes 0.5 ppd. His PSHx includes a coronary stent placement x1.       The history is provided by the patient and the EMS personnel.     Review of patient's allergies indicates:   Allergen Reactions    Unable to assess Other (See Comments)     REX (DRUG FOR HIV) TAKEN 1989 ALMOST PARALYZED LEGS    Aztreonam Nausea And Vomiting     Past Medical History:   Diagnosis Date    Arthritis     Asbestos exposure     COPD (chronic obstructive pulmonary disease)     O2 AT " "NITE PRN    Coronary artery disease     STENT X 1    Depression     HIV (human immunodeficiency virus infection)     Hypertension     NO MED NOW    Migraines     Myocardial infarction     Presence of dental bridge     UPPER AND LOWER    RLS (restless legs syndrome)     Wears glasses      Past Surgical History:   Procedure Laterality Date    CORONARY STENT PLACEMENT      FACIAL FRACTURE SURGERY  metal in left brow and cheek    's    finger scraped Right     index finger scraped at [facility] main for staph infection    rectal warts      warts removed       Family History   Problem Relation Age of Onset    COPD Mother     Cancer Mother       of lung ca w/mets to stomach    Asbestos Mother     Vision loss Sister     Blindness Sister      with bilatereal removal     Depression Sister     Cancer Brother      lung    COPD Brother     Stroke Maternal Aunt     Heart disease Maternal Grandmother      pacemaker    Stroke Maternal Grandmother      Social History   Substance Use Topics    Smoking status: Current Every Day Smoker     Packs/day: 0.50     Years: 50.00     Types: Cigarettes     Last attempt to quit: 2015    Smokeless tobacco: Never Used      Comment: Pt states "already have all the information"    Alcohol use No     Review of Systems   Constitutional: Negative for fever.   HENT: Negative for sore throat.    Respiratory: Positive for cough (secondary to SOB) and shortness of breath.    Cardiovascular: Negative for chest pain.   Gastrointestinal: Negative for nausea.   Genitourinary: Negative for dysuria.   Musculoskeletal: Negative for back pain.   Skin: Negative for rash.   Neurological: Positive for tremors. Negative for weakness.   Hematological: Does not bruise/bleed easily.       Physical Exam     Initial Vitals [10/22/17 0929]   BP Pulse Resp Temp SpO2   139/68 76 (!) 28 97.9 °F (36.6 °C) 96 %      MAP       91.67         Physical Exam    Constitutional: He appears " well-developed and well-nourished.  Non-toxic appearance. No distress. Nasal cannula in place.   HENT:   Head: Normocephalic and atraumatic.   Eyes: EOM are normal. Pupils are equal, round, and reactive to light.   Neck: Normal range of motion. Neck supple. No neck rigidity. No JVD present.   Cardiovascular: Normal rate, regular rhythm, normal heart sounds and intact distal pulses.   No swelling in legs.    Pulmonary/Chest: He has decreased breath sounds.   Diminished breath sounds bilaterally. Rubs with bilateral wheezing on expiration.    Abdominal: Soft. Bowel sounds are normal. He exhibits no distension. There is no tenderness. There is no rigidity, no rebound and no guarding.   Accessory abdominal muscle usage on respiration.    Musculoskeletal: Normal range of motion.   Neurological: He is alert and oriented to person, place, and time. He has normal strength and normal reflexes. No cranial nerve deficit or sensory deficit. He exhibits normal muscle tone. Coordination normal. GCS eye subscore is 4. GCS verbal subscore is 5. GCS motor subscore is 6.   Speaking in short sentences of 2 to 3 words.    Skin: Skin is warm and dry.   Psychiatric: He has a normal mood and affect. His speech is normal and behavior is normal. He is not actively hallucinating.         ED Course   Procedures  Labs Reviewed   CBC W/ AUTO DIFFERENTIAL - Abnormal; Notable for the following:        Result Value    WBC 14.20 (*)     RBC 3.45 (*)     Hemoglobin 10.9 (*)     Hematocrit 35.3 (*)      (*)     MCH 31.6 (*)     MCHC 30.9 (*)     RDW 17.8 (*)     MPV 7.5 (*)     Gran # 11.0 (*)     Gran% 77.2 (*)     Lymph% 14.8 (*)     All other components within normal limits   COMPREHENSIVE METABOLIC PANEL - Abnormal; Notable for the following:     Sodium 146 (*)     Potassium 3.4 (*)     CO2 37 (*)     Glucose 118 (*)     BUN, Bld 25 (*)     Calcium 8.6 (*)     Albumin 2.7 (*)     ALT 8 (*)     Anion Gap 7 (*)     All other components  within normal limits   URINALYSIS - Abnormal; Notable for the following:     Occult Blood UA Trace (*)     All other components within normal limits   PROTIME-INR - Abnormal; Notable for the following:     Prothrombin Time 44.2 (*)     INR 4.5 (*)     All other components within normal limits   B-TYPE NATRIURETIC PEPTIDE - Abnormal; Notable for the following:      (*)     All other components within normal limits     EKG Readings: (Independently Interpreted)   Initial Reading: No STEMI.   Sinus rhythm with premature atrial complexes, 87 BPM, left anterior fascicular block with LVH.  Anterolateral infarct present on or before October 14, 2017     Imaging Results          X-Ray Chest AP Portable (Final result)  Result time 10/22/17 10:40:21    Final result by Madelyn Guan MD (10/22/17 10:40:21)                 Impression:        No significant interval change in the appearance of the chest compared to prior study of 10/15/2017.      Electronically signed by: MADELYN GUAN MD  Date:     10/22/17  Time:    10:40              Narrative:    Comparison study: 10/15/2017  One view chest  There is mild fairly diffuse right lung infiltrate more so parahilar and infrahilar.  There is confluent left perihilar infiltrate and left basilar opacification consistent infiltrate and also with probable left basilar atelectasis.  Small left pleural effusion could also be present.  The cardiac silhouette appears enlarged.    The appearance is not significantly changed                              X-Rays:   Independently Interpreted Readings:   Chest X-Ray: No acute abnormalities. No appreciable decline from previous chest x-ray     Medical Decision Making:   History:   Old Medical Records: I decided to obtain old medical records.  Initial Assessment:   Patient is a 72-year-old man who presents to the emergency department with complaints of shortness of breath that have persisted since his recent discharge from the hospital 3  days ago for pneumonia, heart failure and for which his symptoms became worse this morning.  He has not been taking his medications as prescribed since his discharge secondary to experiencing reflux symptoms.  He was hypoxic on initial evaluation reported by fire department but quickly improved with supplemental oxygen and coughing up thick mucus.  His oxygenation has been appropriate at 94-96% on his home dose of oxygen therapy in the ED.  He has a mild leukocytosis of 14 K with anemia that is at baseline.  His creatinine is normal.  INR is elevated at 4.5 today.  He is informed of this and not to take his Coumadin.  Patient to have very diminished breath sounds on examination bilaterally.  He has not been taking his albuterol treatments nor his Augmentin.  He is provided with antiemetics in the emergency department with improvement in his nausea.  EKG showed no acute ischemic changes.  His symptoms improved after breathing treatments.  I believe he most likely has a COPD exacerbation and accommodation with pneumonia and maybe mucous plug.  I stressed the importance of taking his albuterol, antibiotics and prescribe medications.  Offered admission to the hospital for COPD exacerbation but patient does not want to be admitted.  I will prescribe him PPI and Carafate for his GI side effects of his medications.  He is to follow closely with his PCP.  Return precautions discussed for worsening symptoms.  Clinical Tests:   Lab Tests: Ordered and Reviewed  Radiological Study: Ordered and Reviewed  Medical Tests: Ordered and Reviewed         No future appointments.      Scribe Attestation:   Scribe #1: I performed the above scribed service and the documentation accurately describes the services I performed. I attest to the accuracy of the note.    I, Héctor Cook, personally performed the services described in this documentation. All medical record entries made by the scribe were at my direction and in my presence.   I have reviewed the chart and agree that the record reflects my personal performance and is accurate and complete. Kannan Anaya MD.  2:59 PM 10/22/2017          ED Course      Clinical Impression:     1. COPD exacerbation    2. HCAP (healthcare-associated pneumonia)    3. Gastroesophageal reflux disease, esophagitis presence not specified          Disposition:   Disposition: Discharged  Condition: Stable                        Kannan Anaya MD  10/22/17 1458

## 2017-10-27 ENCOUNTER — HOSPITAL ENCOUNTER (INPATIENT)
Facility: HOSPITAL | Age: 72
LOS: 1 days | Discharge: LEFT BEFORE DISCHARGE | DRG: 189 | End: 2017-10-28
Attending: EMERGENCY MEDICINE | Admitting: INTERNAL MEDICINE
Payer: MEDICARE

## 2017-10-27 DIAGNOSIS — J96.22 ACUTE ON CHRONIC RESPIRATORY FAILURE WITH HYPOXIA AND HYPERCAPNIA: Primary | ICD-10-CM

## 2017-10-27 DIAGNOSIS — J18.9 PNEUMONIA OF LEFT LUNG DUE TO INFECTIOUS ORGANISM, UNSPECIFIED PART OF LUNG: ICD-10-CM

## 2017-10-27 DIAGNOSIS — J96.21 ACUTE ON CHRONIC RESPIRATORY FAILURE WITH HYPOXIA AND HYPERCAPNIA: Primary | ICD-10-CM

## 2017-10-27 LAB
ALLENS TEST: ABNORMAL
ALLENS TEST: ABNORMAL
ANION GAP SERPL CALC-SCNC: 7 MMOL/L
BASOPHILS # BLD AUTO: 0.1 K/UL
BASOPHILS NFR BLD: 0.6 %
BUN SERPL-MCNC: 28 MG/DL
CALCIUM SERPL-MCNC: 8.7 MG/DL
CHLORIDE SERPL-SCNC: 102 MMOL/L
CO2 SERPL-SCNC: 39 MMOL/L
CREAT SERPL-MCNC: 0.9 MG/DL
DELSYS: ABNORMAL
DELSYS: ABNORMAL
DIFFERENTIAL METHOD: ABNORMAL
EOSINOPHIL # BLD AUTO: 0 K/UL
EOSINOPHIL NFR BLD: 0.3 %
EP: 6
EP: 6
ERYTHROCYTE [DISTWIDTH] IN BLOOD BY AUTOMATED COUNT: 17.8 %
ERYTHROCYTE [SEDIMENTATION RATE] IN BLOOD BY WESTERGREN METHOD: 12 MM/H
ERYTHROCYTE [SEDIMENTATION RATE] IN BLOOD BY WESTERGREN METHOD: 12 MM/H
EST. GFR  (AFRICAN AMERICAN): >60 ML/MIN/1.73 M^2
EST. GFR  (NON AFRICAN AMERICAN): >60 ML/MIN/1.73 M^2
FIO2: 50
FIO2: 50
GLUCOSE SERPL-MCNC: 120 MG/DL
HCO3 UR-SCNC: 41.7 MMOL/L (ref 24–28)
HCO3 UR-SCNC: 42.2 MMOL/L (ref 24–28)
HCT VFR BLD AUTO: 36.4 %
HGB BLD-MCNC: 11.3 G/DL
INR PPP: 2.8
IP: 16
IP: 18
LACTATE SERPL-SCNC: 0.8 MMOL/L
LYMPHOCYTES # BLD AUTO: 2 K/UL
LYMPHOCYTES NFR BLD: 19.7 %
MCH RBC QN AUTO: 31.8 PG
MCHC RBC AUTO-ENTMCNC: 31 G/DL
MCV RBC AUTO: 103 FL
MIN VOL: 8.5
MIN VOL: 8.8
MODE: ABNORMAL
MODE: ABNORMAL
MONOCYTES # BLD AUTO: 0.5 K/UL
MONOCYTES NFR BLD: 5 %
NEUTROPHILS # BLD AUTO: 7.7 K/UL
NEUTROPHILS NFR BLD: 74.4 %
PCO2 BLDA: 110 MMHG (ref 35–45)
PCO2 BLDA: 122.4 MMHG (ref 35–45)
PH SMN: 7.14 [PH] (ref 7.35–7.45)
PH SMN: 7.19 [PH] (ref 7.35–7.45)
PLATELET # BLD AUTO: 202 K/UL
PMV BLD AUTO: 7.3 FL
PO2 BLDA: 36 MMHG (ref 40–60)
PO2 BLDA: 37 MMHG (ref 50–70)
POC BE: 13 MMOL/L
POC BE: 14 MMOL/L
POC SATURATED O2: 47 % (ref 95–100)
POC SATURATED O2: 53 % (ref 95–100)
POC TCO2: 45 MMOL/L (ref 24–29)
POC TCO2: 46 MMOL/L (ref 23–27)
POTASSIUM SERPL-SCNC: 3.3 MMOL/L
PROTHROMBIN TIME: 28 SEC
RBC # BLD AUTO: 3.54 M/UL
SAMPLE: ABNORMAL
SAMPLE: ABNORMAL
SITE: ABNORMAL
SITE: ABNORMAL
SODIUM SERPL-SCNC: 148 MMOL/L
SP02: 96
SP02: 97
SPONT RATE: 15
SPONT RATE: 19
TROPONIN I SERPL DL<=0.01 NG/ML-MCNC: 0.02 NG/ML
WBC # BLD AUTO: 10.4 K/UL

## 2017-10-27 PROCEDURE — 63600175 PHARM REV CODE 636 W HCPCS: Performed by: NURSE PRACTITIONER

## 2017-10-27 PROCEDURE — 25000003 PHARM REV CODE 250: Performed by: EMERGENCY MEDICINE

## 2017-10-27 PROCEDURE — 36416 COLLJ CAPILLARY BLOOD SPEC: CPT

## 2017-10-27 PROCEDURE — 82803 BLOOD GASES ANY COMBINATION: CPT

## 2017-10-27 PROCEDURE — 85025 COMPLETE CBC W/AUTO DIFF WBC: CPT

## 2017-10-27 PROCEDURE — 25000003 PHARM REV CODE 250: Performed by: NURSE PRACTITIONER

## 2017-10-27 PROCEDURE — 27000190 HC CPAP FULL FACE MASK W/VALVE

## 2017-10-27 PROCEDURE — 85610 PROTHROMBIN TIME: CPT

## 2017-10-27 PROCEDURE — 80048 BASIC METABOLIC PNL TOTAL CA: CPT

## 2017-10-27 PROCEDURE — 96367 TX/PROPH/DG ADDL SEQ IV INF: CPT

## 2017-10-27 PROCEDURE — 99900035 HC TECH TIME PER 15 MIN (STAT)

## 2017-10-27 PROCEDURE — 83605 ASSAY OF LACTIC ACID: CPT

## 2017-10-27 PROCEDURE — 63600175 PHARM REV CODE 636 W HCPCS: Performed by: EMERGENCY MEDICINE

## 2017-10-27 PROCEDURE — 99285 EMERGENCY DEPT VISIT HI MDM: CPT | Mod: 25

## 2017-10-27 PROCEDURE — 94660 CPAP INITIATION&MGMT: CPT

## 2017-10-27 PROCEDURE — 96365 THER/PROPH/DIAG IV INF INIT: CPT

## 2017-10-27 PROCEDURE — 93005 ELECTROCARDIOGRAM TRACING: CPT

## 2017-10-27 PROCEDURE — 20000000 HC ICU ROOM

## 2017-10-27 PROCEDURE — 94640 AIRWAY INHALATION TREATMENT: CPT

## 2017-10-27 PROCEDURE — 96375 TX/PRO/DX INJ NEW DRUG ADDON: CPT

## 2017-10-27 PROCEDURE — 93010 ELECTROCARDIOGRAM REPORT: CPT | Mod: ,,, | Performed by: INTERNAL MEDICINE

## 2017-10-27 PROCEDURE — 25000242 PHARM REV CODE 250 ALT 637 W/ HCPCS: Performed by: EMERGENCY MEDICINE

## 2017-10-27 PROCEDURE — 84484 ASSAY OF TROPONIN QUANT: CPT

## 2017-10-27 PROCEDURE — 36415 COLL VENOUS BLD VENIPUNCTURE: CPT

## 2017-10-27 PROCEDURE — 87040 BLOOD CULTURE FOR BACTERIA: CPT

## 2017-10-27 RX ORDER — SODIUM CHLORIDE 9 MG/ML
INJECTION, SOLUTION INTRAVENOUS CONTINUOUS
Status: CANCELLED | OUTPATIENT
Start: 2017-10-27

## 2017-10-27 RX ORDER — FLUCONAZOLE 2 MG/ML
200 INJECTION, SOLUTION INTRAVENOUS
Status: DISCONTINUED | OUTPATIENT
Start: 2017-10-27 | End: 2017-10-28 | Stop reason: HOSPADM

## 2017-10-27 RX ORDER — PANTOPRAZOLE SODIUM 40 MG/10ML
40 INJECTION, POWDER, LYOPHILIZED, FOR SOLUTION INTRAVENOUS DAILY
Status: DISCONTINUED | OUTPATIENT
Start: 2017-10-28 | End: 2017-10-28 | Stop reason: HOSPADM

## 2017-10-27 RX ORDER — MORPHINE SULFATE 2 MG/ML
2 INJECTION, SOLUTION INTRAMUSCULAR; INTRAVENOUS EVERY 4 HOURS PRN
Status: DISCONTINUED | OUTPATIENT
Start: 2017-10-27 | End: 2017-10-28 | Stop reason: HOSPADM

## 2017-10-27 RX ORDER — ALBUTEROL SULFATE 2.5 MG/.5ML
10 SOLUTION RESPIRATORY (INHALATION)
Status: COMPLETED | OUTPATIENT
Start: 2017-10-27 | End: 2017-10-27

## 2017-10-27 RX ORDER — METHYLPREDNISOLONE SOD SUCC 125 MG
125 VIAL (EA) INJECTION
Status: COMPLETED | OUTPATIENT
Start: 2017-10-27 | End: 2017-10-27

## 2017-10-27 RX ORDER — ACETAMINOPHEN 10 MG/ML
1000 INJECTION, SOLUTION INTRAVENOUS EVERY 8 HOURS
Status: DISCONTINUED | OUTPATIENT
Start: 2017-10-28 | End: 2017-10-28 | Stop reason: HOSPADM

## 2017-10-27 RX ORDER — IPRATROPIUM BROMIDE 0.5 MG/2.5ML
0.5 SOLUTION RESPIRATORY (INHALATION)
Status: COMPLETED | OUTPATIENT
Start: 2017-10-27 | End: 2017-10-27

## 2017-10-27 RX ORDER — IPRATROPIUM BROMIDE AND ALBUTEROL SULFATE 2.5; .5 MG/3ML; MG/3ML
3 SOLUTION RESPIRATORY (INHALATION) EVERY 6 HOURS
Status: DISCONTINUED | OUTPATIENT
Start: 2017-10-28 | End: 2017-10-28 | Stop reason: HOSPADM

## 2017-10-27 RX ORDER — SODIUM CHLORIDE 450 MG/100ML
INJECTION, SOLUTION INTRAVENOUS CONTINUOUS
Status: DISCONTINUED | OUTPATIENT
Start: 2017-10-27 | End: 2017-10-28 | Stop reason: HOSPADM

## 2017-10-27 RX ADMIN — ALBUTEROL SULFATE 10 MG: 2.5 SOLUTION RESPIRATORY (INHALATION) at 09:10

## 2017-10-27 RX ADMIN — SODIUM CHLORIDE 500 ML: 900 INJECTION, SOLUTION INTRAVENOUS at 10:10

## 2017-10-27 RX ADMIN — METHYLPREDNISOLONE SODIUM SUCCINATE 125 MG: 125 INJECTION, POWDER, FOR SOLUTION INTRAMUSCULAR; INTRAVENOUS at 08:10

## 2017-10-27 RX ADMIN — SODIUM CHLORIDE: 0.45 INJECTION, SOLUTION INTRAVENOUS at 11:10

## 2017-10-27 RX ADMIN — IPRATROPIUM BROMIDE 0.5 MG: 0.5 SOLUTION RESPIRATORY (INHALATION) at 09:10

## 2017-10-27 RX ADMIN — MORPHINE SULFATE 2 MG: 2 INJECTION, SOLUTION INTRAMUSCULAR; INTRAVENOUS at 11:10

## 2017-10-27 RX ADMIN — PIPERACILLIN SODIUM AND TAZOBACTAM SODIUM 3.38 G: 3; .375 INJECTION, POWDER, LYOPHILIZED, FOR SOLUTION INTRAVENOUS at 09:10

## 2017-10-27 RX ADMIN — VANCOMYCIN HYDROCHLORIDE 1000 MG: 1 INJECTION, POWDER, LYOPHILIZED, FOR SOLUTION INTRAVENOUS at 10:10

## 2017-10-28 VITALS
BODY MASS INDEX: 17.93 KG/M2 | DIASTOLIC BLOOD PRESSURE: 58 MMHG | RESPIRATION RATE: 40 BRPM | OXYGEN SATURATION: 99 % | HEIGHT: 69 IN | HEART RATE: 55 BPM | WEIGHT: 121.06 LBS | TEMPERATURE: 98 F | SYSTOLIC BLOOD PRESSURE: 120 MMHG

## 2017-10-28 LAB
ALBUMIN SERPL BCP-MCNC: 2.4 G/DL
ALLENS TEST: ABNORMAL
ALP SERPL-CCNC: 88 U/L
ALT SERPL W/O P-5'-P-CCNC: 5 U/L
ANION GAP SERPL CALC-SCNC: 8 MMOL/L
AST SERPL-CCNC: 12 U/L
BASOPHILS # BLD AUTO: 0 K/UL
BASOPHILS NFR BLD: 0.1 %
BILIRUB SERPL-MCNC: 0.2 MG/DL
BUN SERPL-MCNC: 29 MG/DL
CALCIUM SERPL-MCNC: 8.3 MG/DL
CHLORIDE SERPL-SCNC: 103 MMOL/L
CO2 SERPL-SCNC: 36 MMOL/L
CREAT SERPL-MCNC: 0.9 MG/DL
DELSYS: ABNORMAL
DIFFERENTIAL METHOD: ABNORMAL
EOSINOPHIL # BLD AUTO: 0 K/UL
EOSINOPHIL NFR BLD: 0 %
EP: 6
ERYTHROCYTE [DISTWIDTH] IN BLOOD BY AUTOMATED COUNT: 17.7 %
ERYTHROCYTE [SEDIMENTATION RATE] IN BLOOD BY WESTERGREN METHOD: 12 MM/H
EST. GFR  (AFRICAN AMERICAN): >60 ML/MIN/1.73 M^2
EST. GFR  (NON AFRICAN AMERICAN): >60 ML/MIN/1.73 M^2
FIO2: 50
GLUCOSE SERPL-MCNC: 147 MG/DL
HCO3 UR-SCNC: 39.3 MMOL/L (ref 24–28)
HCT VFR BLD AUTO: 31.2 %
HGB BLD-MCNC: 9.6 G/DL
INR PPP: 2.9
IP: 18
LYMPHOCYTES # BLD AUTO: 0.4 K/UL
LYMPHOCYTES NFR BLD: 4.8 %
MAGNESIUM SERPL-MCNC: 2 MG/DL
MCH RBC QN AUTO: 31.5 PG
MCHC RBC AUTO-ENTMCNC: 30.6 G/DL
MCV RBC AUTO: 103 FL
MIN VOL: 7.6
MODE: ABNORMAL
MONOCYTES # BLD AUTO: 0.1 K/UL
MONOCYTES NFR BLD: 1 %
NEUTROPHILS # BLD AUTO: 7.8 K/UL
NEUTROPHILS NFR BLD: 94.1 %
PCO2 BLDA: 78.4 MMHG (ref 35–45)
PH SMN: 7.31 [PH] (ref 7.35–7.45)
PHOSPHATE SERPL-MCNC: 2.1 MG/DL
PLATELET # BLD AUTO: 138 K/UL
PMV BLD AUTO: 8 FL
PO2 BLDA: 51 MMHG (ref 50–70)
POC BE: 13 MMOL/L
POC SATURATED O2: 80 % (ref 95–100)
POC TCO2: 42 MMOL/L (ref 23–27)
POTASSIUM SERPL-SCNC: 3.2 MMOL/L
PROT SERPL-MCNC: 5.5 G/DL
PROTHROMBIN TIME: 29.1 SEC
RBC # BLD AUTO: 3.03 M/UL
SAMPLE: ABNORMAL
SITE: ABNORMAL
SODIUM SERPL-SCNC: 147 MMOL/L
SP02: 99
SPONT RATE: 14
WBC # BLD AUTO: 8.3 K/UL

## 2017-10-28 PROCEDURE — 82803 BLOOD GASES ANY COMBINATION: CPT

## 2017-10-28 PROCEDURE — 36415 COLL VENOUS BLD VENIPUNCTURE: CPT

## 2017-10-28 PROCEDURE — 83735 ASSAY OF MAGNESIUM: CPT

## 2017-10-28 PROCEDURE — 25000003 PHARM REV CODE 250: Performed by: NURSE PRACTITIONER

## 2017-10-28 PROCEDURE — 84100 ASSAY OF PHOSPHORUS: CPT

## 2017-10-28 PROCEDURE — 80053 COMPREHEN METABOLIC PANEL: CPT

## 2017-10-28 PROCEDURE — 85610 PROTHROMBIN TIME: CPT

## 2017-10-28 PROCEDURE — 63600175 PHARM REV CODE 636 W HCPCS: Performed by: NURSE PRACTITIONER

## 2017-10-28 PROCEDURE — 25000242 PHARM REV CODE 250 ALT 637 W/ HCPCS: Performed by: NURSE PRACTITIONER

## 2017-10-28 PROCEDURE — 85025 COMPLETE CBC W/AUTO DIFF WBC: CPT

## 2017-10-28 PROCEDURE — 36416 COLLJ CAPILLARY BLOOD SPEC: CPT

## 2017-10-28 PROCEDURE — 99900035 HC TECH TIME PER 15 MIN (STAT)

## 2017-10-28 PROCEDURE — 94640 AIRWAY INHALATION TREATMENT: CPT

## 2017-10-28 RX ADMIN — ACETAMINOPHEN 1000 MG: 10 INJECTION, SOLUTION INTRAVENOUS at 05:10

## 2017-10-28 RX ADMIN — METHYLPREDNISOLONE SODIUM SUCCINATE 80 MG: 40 INJECTION, POWDER, FOR SOLUTION INTRAMUSCULAR; INTRAVENOUS at 05:10

## 2017-10-28 RX ADMIN — IPRATROPIUM BROMIDE AND ALBUTEROL SULFATE 3 ML: .5; 3 SOLUTION RESPIRATORY (INHALATION) at 12:10

## 2017-10-28 RX ADMIN — FLUCONAZOLE 200 MG: 2 INJECTION INTRAVENOUS at 02:10

## 2017-10-28 RX ADMIN — PIPERACILLIN SODIUM AND TAZOBACTAM SODIUM 3.38 G: 3; .375 INJECTION, POWDER, LYOPHILIZED, FOR SOLUTION INTRAVENOUS at 05:10

## 2017-10-28 NOTE — PROGRESS NOTES
"0505:  Entered patient room to do morning assessment, I&O and administer meds.  Bipap mask removed and placed on nasal cannula. Water provided from cup at the bedside.   While preparing to administer medications patient became verbally abusive, stating "well I wonder how long that will take" in response to me stating that I would get him some fresh ice water when I got through giving his meds.  Clarified with patient that it would be approximately 10 minutes and I would get him a fresh pitcher of water for him to keep at his bedside. Patient began to make comments such as the following:  "you should go get a job at [employer],"  "they must put all of the mean nurses on the second floor,"  "does your  treat you bad or something, does he even like you."  "I pay your salary, I can treat you anyway I want."  No amount of reasoning by myself or the charge nurse Chelly Regalado RN would satisfy the patient.  House supervisor Melissa Edwards called to patient room and patient was allowed to have time to speak with her (before anyone else) to voice his complaints.  House Supervisor stated after the fact that "he couldn't tell me what his complaint was"  While Supervisor was talking with patient, I went to get him a fresh pitcher of ice water and then some chapstick.  Patient inquired about his bed controls and the explanation  started his verbal abuse all over again in the presence of the house supervisor.  As I went out of the room to get him a cup of ice, he pulled his call bell out of the wall and hurled it at my back, barely missing me.  Patient then expresses a desire to go home and house supervisor provides an AMA form and a call was placed to the patients nephew to come and pick him up.      "

## 2017-10-28 NOTE — PROGRESS NOTES
Pharmacist Renal Dose Adjustment Note    Boni Lerner is a 72 y.o. male being treated with the medication fluconazole.    Patient Data:    Vital Signs (Most Recent):  Temp: 97.2 °F (36.2 °C) (10/27/17 2301)  Pulse: (!) 56 (10/27/17 2330)  Resp: (!) 44 (10/27/17 2330)  BP: (!) 92/51 (10/27/17 2330)  SpO2: 98 % (10/27/17 2330)   Vital Signs (72h Range):  Temp:  [97.2 °F (36.2 °C)-98.3 °F (36.8 °C)]   Pulse:  [56-81]   Resp:  [18-44]   BP: ()/(51-68)   SpO2:  [96 %-100 %]        Recent Labs     Lab 10/22/17  0944 10/27/17  2053   CREATININE 0.9 0.9     Serum creatinine: 0.9 mg/dL 10/27/17 2053  Estimated creatinine clearance: 57.6 mL/min    Medication:fluconazole IV dose: 100 mg frequency 24 hours will be changed to medication:fluconazole IV dose:200 mg frequency:24 hours    Pharmacist's Name: Haley Marie

## 2017-10-28 NOTE — PROGRESS NOTES
Nephew is here.  Patient left AMA in wheelchair with ER  Nurse Javon Arciniega RN at their side.  Patient voided in urinal before he left and threw the urinal on the floor.

## 2017-10-28 NOTE — ED NOTES
Pt presents with altered mental status and SOB-Sats 53% on EMS arrival to home; pts family had Albuterol nebulizer tx in progress.

## 2017-10-28 NOTE — PROGRESS NOTES
Patient resting quietly on nasal cannula.  Bed alarm is on in case he tries to get OOB before his nephew gets here.  Patient is aware that his nephew is on the way.

## 2017-10-28 NOTE — PLAN OF CARE
10/28/17 1040   Discharge Assessment   Assessment Type Discharge Planning Assessment   CM attempted to perform discharge planning assessment but learned once in ICU that patient left AMA.

## 2017-10-28 NOTE — ED PROVIDER NOTES
Encounter Date: 10/27/2017    SCRIBE #1 NOTE: I, Shannon Hernández, am scribing for, and in the presence of, Dr. Capellan.       History     Chief Complaint   Patient presents with    Shortness of Breath     SAts per EMS 53%.  hX OF copd       10/27/2017 8:31 PM     Chief complaint: SOB      Boni Lerner is a 72 y.o. male with COPD, CHF, CAD with MI, HTN, HIV/AIDS who presents to the ED via EMS with an onset of acute exacerbation of chronic SOB, shortly PTA. Per EMS, he is normally on O2 at home. He was found by family smoking a cigarette, wheezing, and with an O2SAT of 50% on room air. His O2SAT improved to 100% with NC. Pt requests DNR and DNI on previous ER visits. He was seen on 10/22/2017 for HTN and wheezing. He was given IV steroids, abx, declined hospice, and was told to follow-up with PCP. Denies fever. Surgical history includes coronary stent placement. Ongoing smoker.         The history is provided by the patient and the EMS personnel.     Review of patient's allergies indicates:   Allergen Reactions    Unable to assess Other (See Comments)     REX (DRUG FOR HIV) TAKEN 1989 ALMOST PARALYZED LEGS    Aztreonam Nausea And Vomiting     Past Medical History:   Diagnosis Date    Arthritis     Asbestos exposure     COPD (chronic obstructive pulmonary disease)     O2 AT NITE PRN    Coronary artery disease     STENT X 1    Depression     HIV (human immunodeficiency virus infection)     Hypertension     NO MED NOW    Migraines     Myocardial infarction     Presence of dental bridge     UPPER AND LOWER    RLS (restless legs syndrome)     Wears glasses      Past Surgical History:   Procedure Laterality Date    CORONARY STENT PLACEMENT      FACIAL FRACTURE SURGERY  metal in left brow and cheek    1970's    finger scraped Right     index finger scraped at [facility] for staph infection    rectal warts  2013    warts removed       Family History   Problem Relation Age of Onset    COPD Mother      "Cancer Mother       of lung ca w/mets to stomach    Asbestos Mother     Vision loss Sister     Blindness Sister      with bilatereal removal     Depression Sister     Cancer Brother      lung    COPD Brother     Stroke Maternal Aunt     Heart disease Maternal Grandmother      pacemaker    Stroke Maternal Grandmother      Social History   Substance Use Topics    Smoking status: Current Every Day Smoker     Packs/day: 0.50     Years: 50.00     Types: Cigarettes     Last attempt to quit: 2015    Smokeless tobacco: Never Used      Comment: Pt states "already have all the information"    Alcohol use No     Review of Systems   Unable to perform ROS: Patient nonverbal   Constitutional: Negative for fever.   Respiratory: Positive for shortness of breath and wheezing.        Physical Exam     Initial Vitals   BP Pulse Resp Temp SpO2   -- -- -- -- --      MAP       --         Vitals:    10/27/17 2106   BP:    Pulse: (!) 57   Resp: (!) 22   Temp:        Physical Exam    Nursing note and vitals reviewed.  Constitutional: He is not diaphoretic. No distress.   HENT:   Head: Normocephalic and atraumatic.   Mouth/Throat: Oropharynx is clear and moist.   Eyes: Conjunctivae are normal.   Neck: Neck supple.   Cardiovascular: Normal rate, regular rhythm, normal heart sounds and intact distal pulses. Exam reveals no gallop and no friction rub.    No murmur heard.  Pulmonary/Chest: No accessory muscle usage. Tachypnea noted. He has decreased breath sounds. He has wheezes. He has no rhonchi. He has no rales.   Diffuse bilateral wheezes. Decreased breath sounds on left.    Abdominal: Soft. He exhibits no distension. There is no tenderness.   Musculoskeletal: Normal range of motion.   No peripheral edema. Legs symmetric and nontender.   Neurological: He is disoriented.   Confused, cannot answer yes or no questions.   Insomlent. No facial asymmetry.   Unable to perform cranial nerve test secondary to pt condition.  "   Skin: No rash noted. No erythema.         ED Course   Critical Care  Date/Time: 10/27/2017 10:47 PM  Performed by: AKIN FITZGERALD  Authorized by: AKIN FITZGERALD   Direct patient critical care time: 25 minutes  Additional history critical care time: 5 minutes  Ordering / reviewing critical care time: 8 minutes  Documentation critical care time: 6 minutes  Consulting other physicians critical care time: 3 minutes  Consult with family critical care time: 4 minutes  Total critical care time (exclusive of procedural time) : 51 minutes        Labs Reviewed   CBC W/ AUTO DIFFERENTIAL - Abnormal; Notable for the following:        Result Value    RBC 3.54 (*)     Hemoglobin 11.3 (*)     Hematocrit 36.4 (*)      (*)     MCH 31.8 (*)     MCHC 31.0 (*)     RDW 17.8 (*)     MPV 7.3 (*)     Gran% 74.4 (*)     All other components within normal limits   BASIC METABOLIC PANEL - Abnormal; Notable for the following:     Sodium 148 (*)     Potassium 3.3 (*)     CO2 39 (*)     Glucose 120 (*)     BUN, Bld 28 (*)     Anion Gap 7 (*)     All other components within normal limits   PROTIME-INR - Abnormal; Notable for the following:     Prothrombin Time 28.0 (*)     INR 2.8 (*)     All other components within normal limits   ISTAT PROCEDURE - Abnormal; Notable for the following:     POC PH 7.140 (*)     POC PCO2 122.4 (*)     POC PO2 36 (*)     POC HCO3 41.7 (*)     POC SATURATED O2 47 (*)     POC TCO2 45 (*)     All other components within normal limits   CULTURE, BLOOD   CULTURE, BLOOD   TROPONIN I   LACTIC ACID, PLASMA         Imaging Results          X-Ray Chest 1 View (In process)                      Medical Decision Making:   History:   Old Medical Records: I decided to obtain old medical records.  Clinical Tests:   Lab Tests: Ordered and Reviewed  Radiological Study: Ordered and Reviewed  Medical Tests: Ordered and Reviewed            Scribe Attestation:   Scribe #1: I performed the above scribed service  and the documentation accurately describes the services I performed. I attest to the accuracy of the note.    I, Dr. Jase Capellan, personally performed the services described in this documentation. All medical record entries made by the scribe were at my direction and in my presence.  I have reviewed the chart and agree that the record reflects my personal performance and is accurate and complete. Jase Capellan MD.  10:45 PM 10/27/2017    Boni Lerner is a 72 y.o. male presenting with recurrent respiratory distress in the setting of recent admission for the same.  Patient appears to have worsening left signing lump pathology suspicious for worsening pneumonia possibly with atelectasis.  I doubt CHF.  There is a likely associated component of COPD given his prior history.  Lactic acid is normal and I doubt severe sepsis.  I do not think aggressive fluid resuscitation is in the patient's best interest as he does have known CHF chronically.  He did require diuresis during last admission.  CODE STATUS is strictly DNR/DNI with no wish for intubation.  He has not yet wishing to pursue hospice.  I will admit to the ICU on BiPAP.  Mental status showed significant improvement with resumption of normal mental status including patient fluent and appropriate.  CO2 appears to be trending downward.  I doubt PE.  I doubt emergent intracranial process.  I do not think further brain imaging or lumbar puncture are indicated.  I spoke with hospitalist service who will assume care.          ED Course as of Oct 27 2151   Fri Oct 27, 2017   2100 CXR:  New, near-complete opacification of L lung worse than prior. (my read)  [MR]   2106 EKG:  NSR, rate of 68, normal intervals, L axis.  Consistent with last prior.  Motion artifact is present.  Old T-wave inversions V5-6.  There are no acute ST or T wave changes suggestive of acute ischemia or infarction.    [MR]      ED Course User Index  [MR] Jase Capellan MD      Clinical Impression:     1. Acute on chronic respiratory failure with hypoxia and hypercapnia    2. Pneumonia of left lung due to infectious organism, unspecified part of lung          Disposition:   Disposition: Admitted                        Jase Capellan MD  10/27/17 7244

## 2017-10-28 NOTE — PROGRESS NOTES
At 1125 pt's nephew Burak came to hospital and picked up pt's blanket and pillow pt had left when he went AMA at 0625 this am.

## 2017-10-28 NOTE — PROGRESS NOTES
Pt placed on Bipap at this time.  Pt tolerated this well.  Pt not responding to questions at this time.  Will continue to monitor.

## 2017-10-28 NOTE — PROGRESS NOTES
Received patient from the ER with diagnosis of exacerbation of COPD.  Patient arrives on nasal cannula.  Provided some ice chips and then placed back on bipap.  Connected to cardiac monitor.  Urinal at bedside.  Admission database and assessment completed.  Patient is lying on his side with eyes closed resting.

## 2017-10-28 NOTE — ED NOTES
Pt becoming angry and trying to pull off BIPAP mask; explained to pt the importance of the BIPAP but pt states he doesn't want it. Notified  and called RT.

## 2017-10-28 NOTE — PLAN OF CARE
10/28/17 1041   Final Note   Assessment Type Final Discharge Note   Discharge Disposition Left Against

## 2017-10-28 NOTE — CONSULTS
Boni Lerner [id number] is a 72 y.o. male who has been consulted for vancomycin dosing.    The patient has the following labs:     Date Creatinine (mg/dl)    BUN WBC Count   10/27/2017 Estimated Creatinine Clearance: 57.6 mL/min (based on SCr of 0.9 mg/dL). Lab Results   Component Value Date    BUN 28 (H) 10/27/2017     Lab Results   Component Value Date    WBC 10.40 10/27/2017        Current weight is 54.9 kg (121 lb 0.5 oz)      The patient received  1000 mg on 10/27 at 2225.    The patient will be started on vancomycin at a dose of 1000 mg every 24 hours. The vancomycin trough has been ordered for 10/29 at 2130.  Target trough goal is 15 to 20.    Patient will be followed by pharmacy for changes in renal function, toxicity, and efficacy.   Thank you for allowing us to participate in this patient's care.     Haley Marie

## 2017-10-29 ENCOUNTER — HOSPITAL ENCOUNTER (INPATIENT)
Facility: HOSPITAL | Age: 72
LOS: 2 days | Discharge: HOSPICE | DRG: 205 | End: 2017-10-31
Attending: EMERGENCY MEDICINE | Admitting: FAMILY MEDICINE
Payer: MEDICARE

## 2017-10-29 DIAGNOSIS — B20 HIV (HUMAN IMMUNODEFICIENCY VIRUS INFECTION): ICD-10-CM

## 2017-10-29 DIAGNOSIS — I25.10 CORONARY ARTERY DISEASE, ANGINA PRESENCE UNSPECIFIED, UNSPECIFIED VESSEL OR LESION TYPE, UNSPECIFIED WHETHER NATIVE OR TRANSPLANTED HEART: ICD-10-CM

## 2017-10-29 DIAGNOSIS — R07.9 CHEST PAIN: ICD-10-CM

## 2017-10-29 DIAGNOSIS — E87.0 HYPERNATREMIA: ICD-10-CM

## 2017-10-29 DIAGNOSIS — J18.9 HCAP (HEALTHCARE-ASSOCIATED PNEUMONIA): ICD-10-CM

## 2017-10-29 DIAGNOSIS — R06.02 SOB (SHORTNESS OF BREATH): ICD-10-CM

## 2017-10-29 DIAGNOSIS — Z91.199 NONCOMPLIANCE: ICD-10-CM

## 2017-10-29 DIAGNOSIS — J96.21 ACUTE ON CHRONIC RESPIRATORY FAILURE WITH HYPOXIA AND HYPERCAPNIA: ICD-10-CM

## 2017-10-29 DIAGNOSIS — R07.9 CHEST PAIN AT REST: ICD-10-CM

## 2017-10-29 DIAGNOSIS — J44.1 COPD WITH ACUTE EXACERBATION: Primary | ICD-10-CM

## 2017-10-29 DIAGNOSIS — J96.22 ACUTE ON CHRONIC RESPIRATORY FAILURE WITH HYPOXIA AND HYPERCAPNIA: ICD-10-CM

## 2017-10-29 LAB
ALLENS TEST: ABNORMAL
ANION GAP SERPL CALC-SCNC: 8 MMOL/L
BASOPHILS # BLD AUTO: 0 K/UL
BASOPHILS NFR BLD: 0 %
BNP SERPL-MCNC: 1671 PG/ML
BUN SERPL-MCNC: 29 MG/DL
CALCIUM SERPL-MCNC: 8.7 MG/DL
CHLORIDE SERPL-SCNC: 103 MMOL/L
CO2 SERPL-SCNC: 39 MMOL/L
CREAT SERPL-MCNC: 0.7 MG/DL
DELSYS: ABNORMAL
DIFFERENTIAL METHOD: ABNORMAL
EOSINOPHIL # BLD AUTO: 0 K/UL
EOSINOPHIL NFR BLD: 0.2 %
ERYTHROCYTE [DISTWIDTH] IN BLOOD BY AUTOMATED COUNT: 17.3 %
EST. GFR  (AFRICAN AMERICAN): >60 ML/MIN/1.73 M^2
EST. GFR  (NON AFRICAN AMERICAN): >60 ML/MIN/1.73 M^2
FLOW: 4
GLUCOSE SERPL-MCNC: 84 MG/DL
HCO3 UR-SCNC: 39.5 MMOL/L (ref 24–28)
HCT VFR BLD AUTO: 32 %
HGB BLD-MCNC: 10.1 G/DL
INR PPP: 1.7
LYMPHOCYTES # BLD AUTO: 2.5 K/UL
LYMPHOCYTES NFR BLD: 18.3 %
MCH RBC QN AUTO: 31.7 PG
MCHC RBC AUTO-ENTMCNC: 31.4 G/DL
MCV RBC AUTO: 101 FL
MODE: ABNORMAL
MONOCYTES # BLD AUTO: 0.6 K/UL
MONOCYTES NFR BLD: 4.2 %
NEUTROPHILS # BLD AUTO: 10.4 K/UL
NEUTROPHILS NFR BLD: 77.3 %
OSMOLALITY SERPL: 313 MOSM/KG
PCO2 BLDA: 67.1 MMHG (ref 35–45)
PH SMN: 7.38 [PH] (ref 7.35–7.45)
PLATELET # BLD AUTO: 184 K/UL
PMV BLD AUTO: 7.9 FL
PO2 BLDA: 48 MMHG (ref 50–70)
POC BE: 14 MMOL/L
POC SATURATED O2: 80 % (ref 95–100)
POC TCO2: 41 MMOL/L (ref 23–27)
POTASSIUM SERPL-SCNC: 3.5 MMOL/L
PROTHROMBIN TIME: 17 SEC
RBC # BLD AUTO: 3.17 M/UL
SAMPLE: ABNORMAL
SITE: ABNORMAL
SODIUM SERPL-SCNC: 150 MMOL/L
SP02: 100
TROPONIN I SERPL DL<=0.01 NG/ML-MCNC: 0.09 NG/ML
TROPONIN I SERPL DL<=0.01 NG/ML-MCNC: 0.09 NG/ML
WBC # BLD AUTO: 13.4 K/UL

## 2017-10-29 PROCEDURE — 25000242 PHARM REV CODE 250 ALT 637 W/ HCPCS: Performed by: EMERGENCY MEDICINE

## 2017-10-29 PROCEDURE — 25000003 PHARM REV CODE 250: Performed by: EMERGENCY MEDICINE

## 2017-10-29 PROCEDURE — 87536 HIV-1 QUANT&REVRSE TRNSCRPJ: CPT

## 2017-10-29 PROCEDURE — 36415 COLL VENOUS BLD VENIPUNCTURE: CPT

## 2017-10-29 PROCEDURE — 5A09357 ASSISTANCE WITH RESPIRATORY VENTILATION, LESS THAN 24 CONSECUTIVE HOURS, CONTINUOUS POSITIVE AIRWAY PRESSURE: ICD-10-PCS | Performed by: INTERNAL MEDICINE

## 2017-10-29 PROCEDURE — 80048 BASIC METABOLIC PNL TOTAL CA: CPT

## 2017-10-29 PROCEDURE — 86361 T CELL ABSOLUTE COUNT: CPT

## 2017-10-29 PROCEDURE — 93010 ELECTROCARDIOGRAM REPORT: CPT | Mod: 76,,, | Performed by: INTERNAL MEDICINE

## 2017-10-29 PROCEDURE — 27000190 HC CPAP FULL FACE MASK W/VALVE

## 2017-10-29 PROCEDURE — 93010 ELECTROCARDIOGRAM REPORT: CPT | Mod: ,,, | Performed by: INTERNAL MEDICINE

## 2017-10-29 PROCEDURE — 83880 ASSAY OF NATRIURETIC PEPTIDE: CPT

## 2017-10-29 PROCEDURE — 94640 AIRWAY INHALATION TREATMENT: CPT

## 2017-10-29 PROCEDURE — 82803 BLOOD GASES ANY COMBINATION: CPT

## 2017-10-29 PROCEDURE — 25000242 PHARM REV CODE 250 ALT 637 W/ HCPCS: Performed by: INTERNAL MEDICINE

## 2017-10-29 PROCEDURE — 85610 PROTHROMBIN TIME: CPT

## 2017-10-29 PROCEDURE — 96367 TX/PROPH/DG ADDL SEQ IV INF: CPT

## 2017-10-29 PROCEDURE — 36416 COLLJ CAPILLARY BLOOD SPEC: CPT

## 2017-10-29 PROCEDURE — 99900035 HC TECH TIME PER 15 MIN (STAT)

## 2017-10-29 PROCEDURE — 83930 ASSAY OF BLOOD OSMOLALITY: CPT

## 2017-10-29 PROCEDURE — 63600175 PHARM REV CODE 636 W HCPCS: Performed by: EMERGENCY MEDICINE

## 2017-10-29 PROCEDURE — 94761 N-INVAS EAR/PLS OXIMETRY MLT: CPT

## 2017-10-29 PROCEDURE — 63600175 PHARM REV CODE 636 W HCPCS: Performed by: FAMILY MEDICINE

## 2017-10-29 PROCEDURE — 12000002 HC ACUTE/MED SURGE SEMI-PRIVATE ROOM

## 2017-10-29 PROCEDURE — 25000003 PHARM REV CODE 250: Performed by: FAMILY MEDICINE

## 2017-10-29 PROCEDURE — 96375 TX/PRO/DX INJ NEW DRUG ADDON: CPT

## 2017-10-29 PROCEDURE — 85025 COMPLETE CBC W/AUTO DIFF WBC: CPT

## 2017-10-29 PROCEDURE — 84484 ASSAY OF TROPONIN QUANT: CPT

## 2017-10-29 PROCEDURE — 63600175 PHARM REV CODE 636 W HCPCS

## 2017-10-29 PROCEDURE — 94660 CPAP INITIATION&MGMT: CPT

## 2017-10-29 PROCEDURE — 99291 CRITICAL CARE FIRST HOUR: CPT | Mod: 25

## 2017-10-29 PROCEDURE — 93005 ELECTROCARDIOGRAM TRACING: CPT

## 2017-10-29 PROCEDURE — 27000221 HC OXYGEN, UP TO 24 HOURS

## 2017-10-29 PROCEDURE — 96365 THER/PROPH/DIAG IV INF INIT: CPT

## 2017-10-29 RX ORDER — NITROGLYCERIN 0.3 MG/1
0.3 TABLET SUBLINGUAL EVERY 5 MIN PRN
Status: DISCONTINUED | OUTPATIENT
Start: 2017-10-29 | End: 2017-10-31 | Stop reason: HOSPADM

## 2017-10-29 RX ORDER — TIOTROPIUM BROMIDE 18 UG/1
18 CAPSULE ORAL; RESPIRATORY (INHALATION) DAILY
Status: DISCONTINUED | OUTPATIENT
Start: 2017-10-30 | End: 2017-10-29

## 2017-10-29 RX ORDER — ONDANSETRON 2 MG/ML
4 INJECTION INTRAMUSCULAR; INTRAVENOUS
Status: COMPLETED | OUTPATIENT
Start: 2017-10-29 | End: 2017-10-29

## 2017-10-29 RX ORDER — WARFARIN 2.5 MG/1
2.5 TABLET ORAL DAILY
Status: DISCONTINUED | OUTPATIENT
Start: 2017-10-29 | End: 2017-10-31 | Stop reason: HOSPADM

## 2017-10-29 RX ORDER — ALBUTEROL SULFATE 2.5 MG/.5ML
2.5 SOLUTION RESPIRATORY (INHALATION) EVERY 6 HOURS PRN
Status: DISCONTINUED | OUTPATIENT
Start: 2017-10-29 | End: 2017-10-31 | Stop reason: HOSPADM

## 2017-10-29 RX ORDER — EMTRICITABINE 200 MG/1
200 CAPSULE ORAL NIGHTLY
Status: DISCONTINUED | OUTPATIENT
Start: 2017-10-29 | End: 2017-10-31 | Stop reason: HOSPADM

## 2017-10-29 RX ORDER — TENOFOVIR DISOPROXIL FUMARATE 300 MG/1
300 TABLET, FILM COATED ORAL NIGHTLY
Status: DISCONTINUED | OUTPATIENT
Start: 2017-10-29 | End: 2017-10-31 | Stop reason: HOSPADM

## 2017-10-29 RX ORDER — FUROSEMIDE 20 MG/1
20 TABLET ORAL DAILY
Status: DISCONTINUED | OUTPATIENT
Start: 2017-10-30 | End: 2017-10-29

## 2017-10-29 RX ORDER — FLUOXETINE HYDROCHLORIDE 20 MG/1
40 CAPSULE ORAL NIGHTLY
Status: DISCONTINUED | OUTPATIENT
Start: 2017-10-29 | End: 2017-10-31 | Stop reason: HOSPADM

## 2017-10-29 RX ORDER — ONDANSETRON 2 MG/ML
INJECTION INTRAMUSCULAR; INTRAVENOUS
Status: COMPLETED
Start: 2017-10-29 | End: 2017-10-29

## 2017-10-29 RX ORDER — AMOXICILLIN 250 MG
1 CAPSULE ORAL 2 TIMES DAILY PRN
Status: DISCONTINUED | OUTPATIENT
Start: 2017-10-29 | End: 2017-10-31 | Stop reason: HOSPADM

## 2017-10-29 RX ORDER — ACETAMINOPHEN 325 MG/1
650 TABLET ORAL EVERY 6 HOURS PRN
Status: DISCONTINUED | OUTPATIENT
Start: 2017-10-29 | End: 2017-10-31 | Stop reason: HOSPADM

## 2017-10-29 RX ORDER — LISINOPRIL 2.5 MG/1
5 TABLET ORAL DAILY
Status: DISCONTINUED | OUTPATIENT
Start: 2017-10-30 | End: 2017-10-31 | Stop reason: HOSPADM

## 2017-10-29 RX ORDER — ONDANSETRON HYDROCHLORIDE 4 MG/5ML
4 SOLUTION ORAL EVERY 6 HOURS PRN
Status: DISCONTINUED | OUTPATIENT
Start: 2017-10-29 | End: 2017-10-31 | Stop reason: HOSPADM

## 2017-10-29 RX ORDER — METOPROLOL TARTRATE 25 MG/1
12.5 TABLET ORAL 2 TIMES DAILY
Status: DISCONTINUED | OUTPATIENT
Start: 2017-10-29 | End: 2017-10-31 | Stop reason: HOSPADM

## 2017-10-29 RX ORDER — EFAVIRENZ, EMTRICITABINE AND TENOFOVIR DISOPROXIL FUMARATE 600; 200; 300 MG/1; MG/1; MG/1
1 TABLET, FILM COATED ORAL NIGHTLY
Status: DISCONTINUED | OUTPATIENT
Start: 2017-10-29 | End: 2017-10-29

## 2017-10-29 RX ORDER — IPRATROPIUM BROMIDE AND ALBUTEROL SULFATE 2.5; .5 MG/3ML; MG/3ML
3 SOLUTION RESPIRATORY (INHALATION) EVERY 4 HOURS
Status: DISCONTINUED | OUTPATIENT
Start: 2017-10-29 | End: 2017-10-29

## 2017-10-29 RX ORDER — IPRATROPIUM BROMIDE 0.5 MG/2.5ML
0.5 SOLUTION RESPIRATORY (INHALATION)
Status: COMPLETED | OUTPATIENT
Start: 2017-10-29 | End: 2017-10-29

## 2017-10-29 RX ORDER — FUROSEMIDE 10 MG/ML
40 INJECTION INTRAMUSCULAR; INTRAVENOUS ONCE
Status: COMPLETED | OUTPATIENT
Start: 2017-10-29 | End: 2017-10-29

## 2017-10-29 RX ORDER — MORPHINE SULFATE 15 MG/1
15 TABLET ORAL EVERY 6 HOURS PRN
Status: DISCONTINUED | OUTPATIENT
Start: 2017-10-29 | End: 2017-10-31 | Stop reason: HOSPADM

## 2017-10-29 RX ORDER — EFAVIRENZ 600 MG/1
600 TABLET, FILM COATED ORAL NIGHTLY
Status: DISCONTINUED | OUTPATIENT
Start: 2017-10-29 | End: 2017-10-31 | Stop reason: HOSPADM

## 2017-10-29 RX ORDER — FLUTICASONE FUROATE AND VILANTEROL 100; 25 UG/1; UG/1
1 POWDER RESPIRATORY (INHALATION) DAILY
Status: DISCONTINUED | OUTPATIENT
Start: 2017-10-30 | End: 2017-10-31 | Stop reason: HOSPADM

## 2017-10-29 RX ORDER — IPRATROPIUM BROMIDE AND ALBUTEROL SULFATE 2.5; .5 MG/3ML; MG/3ML
3 SOLUTION RESPIRATORY (INHALATION) EVERY 4 HOURS
Status: DISCONTINUED | OUTPATIENT
Start: 2017-10-29 | End: 2017-10-31 | Stop reason: HOSPADM

## 2017-10-29 RX ORDER — ALBUTEROL SULFATE 90 UG/1
2 AEROSOL, METERED RESPIRATORY (INHALATION) EVERY 6 HOURS PRN
Status: DISCONTINUED | OUTPATIENT
Start: 2017-10-29 | End: 2017-10-29

## 2017-10-29 RX ORDER — PANTOPRAZOLE SODIUM 20 MG/1
20 TABLET, DELAYED RELEASE ORAL DAILY
Status: DISCONTINUED | OUTPATIENT
Start: 2017-10-30 | End: 2017-10-31 | Stop reason: HOSPADM

## 2017-10-29 RX ORDER — TOPIRAMATE 25 MG/1
100 TABLET ORAL NIGHTLY
Status: DISCONTINUED | OUTPATIENT
Start: 2017-10-29 | End: 2017-10-31 | Stop reason: HOSPADM

## 2017-10-29 RX ORDER — METHYLPREDNISOLONE SOD SUCC 125 MG
125 VIAL (EA) INJECTION EVERY 8 HOURS
Status: DISCONTINUED | OUTPATIENT
Start: 2017-10-29 | End: 2017-10-30

## 2017-10-29 RX ORDER — FUROSEMIDE 10 MG/ML
20 INJECTION INTRAMUSCULAR; INTRAVENOUS 2 TIMES DAILY
Status: DISCONTINUED | OUTPATIENT
Start: 2017-10-30 | End: 2017-10-31 | Stop reason: HOSPADM

## 2017-10-29 RX ORDER — ALBUTEROL SULFATE 2.5 MG/.5ML
10 SOLUTION RESPIRATORY (INHALATION) CONTINUOUS
Status: DISCONTINUED | OUTPATIENT
Start: 2017-10-29 | End: 2017-10-29

## 2017-10-29 RX ADMIN — Medication 12.5 MG: at 09:10

## 2017-10-29 RX ADMIN — PIPERACILLIN SODIUM AND TAZOBACTAM SODIUM 3.38 G: 3; .375 INJECTION, POWDER, LYOPHILIZED, FOR SOLUTION INTRAVENOUS at 03:10

## 2017-10-29 RX ADMIN — EMTRICITABINE 200 MG: 200 CAPSULE ORAL at 09:10

## 2017-10-29 RX ADMIN — NITROGLYCERIN 0.3 MG: 0.4 TABLET SUBLINGUAL at 08:10

## 2017-10-29 RX ADMIN — EFAVIRENZ 600 MG: 600 TABLET, FILM COATED ORAL at 09:10

## 2017-10-29 RX ADMIN — NITROGLYCERIN 0.3 MG: 0.4 TABLET SUBLINGUAL at 07:10

## 2017-10-29 RX ADMIN — TENOFOVIR DISOPROXIL FUMARATE 300 MG: 300 TABLET, COATED ORAL at 09:10

## 2017-10-29 RX ADMIN — WARFARIN SODIUM 2.5 MG: 2.5 TABLET ORAL at 06:10

## 2017-10-29 RX ADMIN — ONDANSETRON 4 MG: 2 INJECTION INTRAMUSCULAR; INTRAVENOUS at 08:10

## 2017-10-29 RX ADMIN — METHYLPREDNISOLONE SODIUM SUCCINATE 125 MG: 125 INJECTION, POWDER, FOR SOLUTION INTRAMUSCULAR; INTRAVENOUS at 09:10

## 2017-10-29 RX ADMIN — FLUOXETINE 40 MG: 20 CAPSULE ORAL at 09:10

## 2017-10-29 RX ADMIN — PIPERACILLIN SODIUM AND TAZOBACTAM SODIUM 3.38 G: 3; .375 INJECTION, POWDER, LYOPHILIZED, FOR SOLUTION INTRAVENOUS at 11:10

## 2017-10-29 RX ADMIN — ONDANSETRON 4 MG: 2 INJECTION INTRAMUSCULAR; INTRAVENOUS at 03:10

## 2017-10-29 RX ADMIN — FUROSEMIDE 40 MG: 10 INJECTION, SOLUTION INTRAMUSCULAR; INTRAVENOUS at 09:10

## 2017-10-29 RX ADMIN — IPRATROPIUM BROMIDE 0.5 MG: 0.5 SOLUTION RESPIRATORY (INHALATION) at 01:10

## 2017-10-29 RX ADMIN — TOPIRAMATE 100 MG: 25 TABLET, FILM COATED ORAL at 09:10

## 2017-10-29 RX ADMIN — VANCOMYCIN HYDROCHLORIDE 1000 MG: 1 INJECTION, POWDER, LYOPHILIZED, FOR SOLUTION INTRAVENOUS at 02:10

## 2017-10-29 RX ADMIN — ALBUTEROL SULFATE 10 MG: 2.5 SOLUTION RESPIRATORY (INHALATION) at 01:10

## 2017-10-29 RX ADMIN — IPRATROPIUM BROMIDE AND ALBUTEROL SULFATE 3 ML: .5; 3 SOLUTION RESPIRATORY (INHALATION) at 08:10

## 2017-10-29 NOTE — H&P
[facility]  History & Physical    SUBJECTIVE:     Chief Complaint/Reason for Admission: shortness of breath     History of Present Illness:     Male with current medical history of COPD, and just of heart failure, coronary artery disease, HIV, hypertension, prior history of MI and left lung cancer presents to the emergency room today with chief complaint of shortness of breath.  Patient was admitted to the hospital Saturday morning area patient presented in respiratory distress.  Patient was admitted to the ICU.  Patient became very combative during hospitalization and left AMA.  Patient has returned today with ongoing shortness of breath.  Patient has been using albuterol treatments at home which have not alleviated his symptoms.  Patient is DO NOT RESUSCITATE/DO NOT INTUBATE.  Patient is agreeable to BiPAP when needed.  Patient has a history of noncompliance.  Patient is not exhibiting any aggressive behavior at the current time.        (Not in a hospital admission)    Review of patient's allergies indicates:   Allergen Reactions    Unable to assess Other (See Comments)     NORIAN (DRUG FOR HIV) TAKEN 1989 ALMOST PARALYZED LEGS    Aztreonam Nausea And Vomiting       Past Medical History:   Diagnosis Date    Arthritis     Asbestos exposure     COPD (chronic obstructive pulmonary disease)     O2 AT NITE PRN    Coronary artery disease     STENT X 1    Depression     HIV (human immunodeficiency virus infection)     Hypertension     NO MED NOW    Migraines     Myocardial infarction     Presence of dental bridge     UPPER AND LOWER    RLS (restless legs syndrome)     Wears glasses      Past Surgical History:   Procedure Laterality Date    CORONARY STENT PLACEMENT      FACIAL FRACTURE SURGERY  metal in left brow and cheek    1970's    finger scraped Right     index finger scraped at [facility] for staph infection    rectal warts  2013    warts removed       Family History   Problem  "Relation Age of Onset    COPD Mother     Cancer Mother       of lung ca w/mets to stomach    Asbestos Mother     Vision loss Sister     Blindness Sister      with bilatereal removal     Depression Sister     Cancer Brother      lung    COPD Brother     Stroke Maternal Aunt     Heart disease Maternal Grandmother      pacemaker    Stroke Maternal Grandmother      Social History   Substance Use Topics    Smoking status: Current Every Day Smoker     Packs/day: 0.25     Years: 50.00     Types: Cigarettes     Last attempt to quit: 2015    Smokeless tobacco: Never Used      Comment: Pt states "already have all the information"    Alcohol use No        Review of Systems:  Constitutional: Negative for fever.   HENT: Negative for sore throat.    Respiratory: Positive for chest tightness and shortness of breath.    Cardiovascular: Negative for chest pain.   Gastrointestinal: Positive for abdominal pain (Mild.). Negative for nausea.   Genitourinary: Negative for dysuria.   Musculoskeletal: Negative for back pain.   Skin: Negative for rash.   Neurological: Negative for weakness.   Hematological: Does not bruise/bleed easily.     OBJECTIVE:     Vital Signs (Most Recent):  Temp: 98.4 °F (36.9 °C) (10/29/17 1321)  Pulse: 100 (10/29/17 1621)  Resp: (!) 22 (10/29/17 1441)  BP: (!) 140/81 (10/29/17 1532)  SpO2: 95 % (10/29/17 1621)    Physical Exam:  Constitutional: He appears well-developed and well-nourished. He is not diaphoretic. No distress.   HENT:   Head: Normocephalic and atraumatic.   Mouth/Throat: Oropharynx is clear and moist.   Eyes: Conjunctivae are normal.   Neck: Neck supple.   Cardiovascular: Normal rate, regular rhythm, normal heart sounds and intact distal pulses. Exam reveals no gallop and no friction rub.    No murmur heard.  Pulmonary/Chest: .    inspiratory and expiratory wheezes scattered rhonchi    Abdominal: Soft. He exhibits no distension. There is no tenderness.   Musculoskeletal: " Normal range of motion.    Neurological: He is alert and oriented to person, place, and time. No cranial nerve deficit or sensory deficit.   Skin: No rash noted. No erythema.   Psychiatric: He has a normal mood and affect. His behavior is normal. Judgment and thought content normal.     Laboratory:  CBC:   Recent Labs  Lab 10/29/17  1337   WBC 13.40*   RBC 3.17*   HGB 10.1*   HCT 32.0*      *   MCH 31.7*   MCHC 31.4*     CMP:   Recent Labs  Lab 10/28/17  0336 10/29/17  1337   * 84   CALCIUM 8.3* 8.7   ALBUMIN 2.4*  --    PROT 5.5*  --    * 150*   K 3.2* 3.5   CO2 36* 39*    103   BUN 29* 29*   CREATININE 0.9 0.7   ALKPHOS 88  --    ALT 5*  --    AST 12  --    BILITOT 0.2  --        Diagnostic Results:  Chest X-ray   Persistent extensive left lung opacification likely due to combination of pleural effusion and atelectasis with underlying consolidation not excluded.  COPD.    ASSESSMENT/PLAN:   COPD exacerbation  -duo neb q 4 hours  -continue home inhalers  -methylprednisolone 125mg q 8 hours   -bipap   -follow up pulmonary for further recommendations     Pneumonia  -zoysn   -vancomycin   -follow up ID    CHF  -chronic problem continue home medications  - follow up BNP    HIV  -follow up with ID    HTN  -chronic problem continue home meds     Hypernatremia   -follow up urine studies   -follow up nephrology     Elevated Troponin  -trend trop / ekg     Anemia  -chronic problem continue to trend H/H     Christian Damon MD  [facility]  10/29/2017 4:54 PM

## 2017-10-29 NOTE — CONSULTS
Boni Lerner [id number] is a 72 y.o. male who has been consulted for vancomycin dosing.    The patient has the following labs:     Date Creatinine (mg/dl)    BUN WBC Count   10/29/2017 Estimated Creatinine Clearance: 74.1 mL/min (based on SCr of 0.7 mg/dL). Lab Results   Component Value Date    BUN 29 (H) 10/29/2017     Lab Results   Component Value Date    WBC 13.40 (H) 10/29/2017        Current weight is 54.9 kg (121 lb)      The patient received  1000 mg on 10/29/2017 at 1413 (if pt has already received first dose including doses in ED)    The patient will be started on vancomycin at a dose of 750 mg every 12 hours (15 mg/kg/dose).  The vancomycin trough has been ordered for 10/31/2007 at 0130.      Patient will be followed by pharmacy for changes in renal function, toxicity, and efficacy.   Thank you for allowing us to participate in this patient's care.     Edel Altamirano

## 2017-10-29 NOTE — ED NOTES
Pt taken off of bipap. Placed on O2 at 4 liters by NC as ordered by Dr. Capellan. Will continue to monitor pt.

## 2017-10-29 NOTE — ED PROVIDER NOTES
Encounter Date: 10/29/2017    SCRIBE #1 NOTE: I, Shannon Hernández, am scribing for, and in the presence of, Dr. Capellan.       History     Chief Complaint   Patient presents with    Shortness of Breath       10/29/2017 1:20 PM     Chief complaint: SOB      Boni Lerner is a 72 y.o. male with COPD, CHF, CAD with MI, HTN, HIV/AIDS, and prior left lung cancer who presents to the ED via EMS with an onset of acute exacerbation of chronic SOB, shortly PTA. Associated symptoms include chest tightness and mild abd pain. Per EMS, his O2 was 70% in route and improved with nebulizer. Per EMS, he is normally on 5 L NC O2 at home. Pt was seen 10/27/2017 by Dr. Capellan for SOB and diagnosed with 1) acute on chronic respiratory failure with hypoxia and hypercapnia 2) pneumonia of left lung due to infectious organism. Pt reports he left the hospital not feeling any better. He tried multiple Albuterol treatments at home that did not alleviate his symptoms. Pt requests DNR and DNI, but gives consent for BiPAP. Denies fever. Surgical history includes coronary stent placement. Ongoing smoker.       The history is provided by the patient and the EMS personnel.     Review of patient's allergies indicates:   Allergen Reactions    Unable to assess Other (See Comments)     REX (DRUG FOR HIV) TAKEN 1989 ALMOST PARALYZED LEGS    Aztreonam Nausea And Vomiting     Past Medical History:   Diagnosis Date    Arthritis     Asbestos exposure     COPD (chronic obstructive pulmonary disease)     O2 AT NITE PRN    Coronary artery disease     STENT X 1    Depression     HIV (human immunodeficiency virus infection)     Hypertension     NO MED NOW    Migraines     Myocardial infarction     Presence of dental bridge     UPPER AND LOWER    RLS (restless legs syndrome)     Wears glasses      Past Surgical History:   Procedure Laterality Date    CORONARY STENT PLACEMENT      FACIAL FRACTURE SURGERY  metal in left brow and cheek     "1970's    finger scraped Right     index finger scraped at [facility] for staph infection    rectal warts  2013    warts removed       Family History   Problem Relation Age of Onset    COPD Mother     Cancer Mother       of lung ca w/mets to stomach    Asbestos Mother     Vision loss Sister     Blindness Sister      with bilatereal removal     Depression Sister     Cancer Brother      lung    COPD Brother     Stroke Maternal Aunt     Heart disease Maternal Grandmother      pacemaker    Stroke Maternal Grandmother      Social History   Substance Use Topics    Smoking status: Current Every Day Smoker     Packs/day: 0.25     Years: 50.00     Types: Cigarettes     Last attempt to quit: 2015    Smokeless tobacco: Never Used      Comment: Pt states "already have all the information"    Alcohol use No     Review of Systems   Constitutional: Negative for fever.   HENT: Negative for sore throat.    Respiratory: Positive for chest tightness and shortness of breath.    Cardiovascular: Negative for chest pain.   Gastrointestinal: Positive for abdominal pain (Mild.). Negative for nausea.   Genitourinary: Negative for dysuria.   Musculoskeletal: Negative for back pain.   Skin: Negative for rash.   Neurological: Negative for weakness.   Hematological: Does not bruise/bleed easily.       Physical Exam     Initial Vitals   BP Pulse Resp Temp SpO2   -- -- -- -- --      MAP       --         Vitals:    10/29/17 1441   BP:    Pulse:    Resp: (!) 22   Temp:        Physical Exam    Nursing note and vitals reviewed.  Constitutional: He appears well-developed and well-nourished. He is not diaphoretic. No distress.   HENT:   Head: Normocephalic and atraumatic.   Mouth/Throat: Oropharynx is clear and moist.   Eyes: Conjunctivae are normal.   Neck: Neck supple.   Cardiovascular: Normal rate, regular rhythm, normal heart sounds and intact distal pulses. Exam reveals no gallop and no friction rub.    No murmur " heard.  Pulmonary/Chest: Accessory muscle usage present. He has wheezes. He has rhonchi. He has no rales.   Diffuse bilateral inspiratory and expiratory wheezes with prolonged expiratory phase. Scattered rhonchi. Mild accessory muscle use, no retraction.    Abdominal: Soft. He exhibits no distension. There is no tenderness.   Musculoskeletal: Normal range of motion.   Minimal peripheral edema.   Neurological: He is alert and oriented to person, place, and time. No cranial nerve deficit or sensory deficit.   5/5 strength and sensation.  CN III-XI intact.   Skin: No rash noted. No erythema.   Psychiatric: He has a normal mood and affect. His behavior is normal. Judgment and thought content normal.         ED Course   Critical Care  Date/Time: 10/29/2017 9:06 PM  Performed by: AKIN FITZGERALD  Authorized by: CARMEN SOUSA   Direct patient critical care time: 25 minutes  Additional history critical care time: 5 minutes  Ordering / reviewing critical care time: 6 minutes  Documentation critical care time: 5 minutes  Consulting other physicians critical care time: 6 minutes  Total critical care time (exclusive of procedural time) : 47 minutes        Labs Reviewed   CBC W/ AUTO DIFFERENTIAL - Abnormal; Notable for the following:        Result Value    WBC 13.40 (*)     RBC 3.17 (*)     Hemoglobin 10.1 (*)     Hematocrit 32.0 (*)      (*)     MCH 31.7 (*)     MCHC 31.4 (*)     RDW 17.3 (*)     MPV 7.9 (*)     Gran # 10.4 (*)     Gran% 77.3 (*)     All other components within normal limits   BASIC METABOLIC PANEL - Abnormal; Notable for the following:     Sodium 150 (*)     CO2 39 (*)     BUN, Bld 29 (*)     All other components within normal limits   TROPONIN I - Abnormal; Notable for the following:     Troponin I 0.091 (*)     All other components within normal limits   PROTIME-INR - Abnormal; Notable for the following:     Prothrombin Time 17.0 (*)     INR 1.7 (*)     All other components within normal limits    ISTAT PROCEDURE - Abnormal; Notable for the following:     POC PCO2 67.1 (*)     POC PO2 48 (*)     POC HCO3 39.5 (*)     POC SATURATED O2 80 (*)     POC TCO2 41 (*)     All other components within normal limits         Imaging Results          X-Ray Chest 1 View (Final result)  Result time 10/29/17 14:15:01    Final result by Ramsey Monique MD (10/29/17 14:15:01)                 Impression:      Persistent extensive left lung opacification likely due to combination of pleural effusion and atelectasis with underlying consolidation not excluded.  COPD.      Electronically signed by: Ramsey Monique MD  Date:     10/29/17  Time:    14:15              Narrative:    AP chest compared to 10/27/17    Findings: There is confluent opacification of the left mid lower lung zone, with hazy opacification of the left upper lung zone. Leftward mediastinal shift is present. The right lung demonstrates hyperexpansion and interstitial change in keeping with COPD.                            (radiology reading, visualized by me)         Medical Decision Making:   History:   Old Medical Records: I decided to obtain old medical records.  Clinical Tests:   Lab Tests: Ordered and Reviewed  Radiological Study: Ordered and Reviewed  Medical Tests: Ordered and Reviewed            Scribe Attestation:   Scribe #1: I performed the above scribed service and the documentation accurately describes the services I performed. I attest to the accuracy of the note.    I, Dr. Jase Capellan, personally performed the services described in this documentation. All medical record entries made by the scribe were at my direction and in my presence.  I have reviewed the chart and agree that the record reflects my personal performance and is accurate and complete. Jase Capellan MD.  9:04 PM 10/29/2017    Boni Lerner is a 72 y.o. male presenting with COPD exacerbation likely multifactorial including no acute lung process with likely  atelectasis with effusion and possible pneumonia.  Antibiotics initiated.  Blood cultures or any recently drawn on last admission was discharged AMA within the last 24 hours.  I doubt sepsis at this point.  He appears rather than previous presentation with no altered mental status and only mild hypercapnia.  BiPAP initiated for several hours in the emergency department with further improvement transitioned to high flow nasal cannula and appropriate for monitored bed.  I have spoken with Dr. Deuce hester from the hospitalist service who is assuming care.  He will be admitted for pulmonology consultation with serial breathing treatments and continued steroids.  Very low suspicion for other emergent process such as PE or CHF.  I doubt ACS.  Mild troponin elevation noted to be trended.  I have spoken extensively with family about grave prognosis and need to further consider end-of-life options.  Patient is currently DO NOT RESUSCITATE/DO NOT RESUSCITATE.          ED Course as of Oct 29 1459   Sun Oct 29, 2017   1332 EKG:  Sinus rhythm with PVCs, rate of 82, normal intervals, old lateral T-wave inversions V4-6 compared to last prior.  There are no acute ST or T wave changes suggestive of acute ischemia or infarction.  No new arrhythmia.    [MR]   1358 CXR:  Near-complete opacification of the L lung similar to prior but new since last 2 XR.  (my read)  [MR]      ED Course User Index  [MR] Jase Capellan MD     Clinical Impression:     1. COPD with acute exacerbation    2. Noncompliance          Disposition:   Disposition: Admitted                        Jase Capellan MD  10/29/17 6889

## 2017-10-29 NOTE — ED NOTES
Pt brought to ED via EMS from home with c/o SOB. The pt recently left AMA from our ICU. Upon arrival to the ED the pt is AAOx4. Skin warm, dry to touch. Respirations slightly labored. Pt speaking in full sentences. O2 saturation in the lower 80's on room air. On O2 at 4 l/min by NC saturation is 94-99%. Pt placed on cardiac monitor, BP cuff, and pulse ox upon arrival. NSR noted to the monitor. Will continue to monitor.

## 2017-10-30 PROBLEM — C34.32 MALIGNANT NEOPLASM OF LOWER LOBE OF LEFT LUNG: Status: ACTIVE | Noted: 2017-10-30

## 2017-10-30 LAB
ALBUMIN SERPL BCP-MCNC: 2.5 G/DL
ANION GAP SERPL CALC-SCNC: 9 MMOL/L
BASOPHILS # BLD AUTO: 0 K/UL
BASOPHILS NFR BLD: 0 %
BUN SERPL-MCNC: 25 MG/DL
CALCIUM SERPL-MCNC: 8.2 MG/DL
CD3+CD4+ CELLS # BLD: 72 CELLS/UL (ref 300–1400)
CD3+CD4+ CELLS NFR BLD: 33 % (ref 28–57)
CHLORIDE SERPL-SCNC: 97 MMOL/L
CO2 SERPL-SCNC: 39 MMOL/L
CREAT SERPL-MCNC: 0.8 MG/DL
DIFFERENTIAL METHOD: ABNORMAL
EOSINOPHIL # BLD AUTO: 0 K/UL
EOSINOPHIL NFR BLD: 0 %
ERYTHROCYTE [DISTWIDTH] IN BLOOD BY AUTOMATED COUNT: 17 %
EST. GFR  (AFRICAN AMERICAN): >60 ML/MIN/1.73 M^2
EST. GFR  (NON AFRICAN AMERICAN): >60 ML/MIN/1.73 M^2
GLUCOSE SERPL-MCNC: 148 MG/DL
HCT VFR BLD AUTO: 30.3 %
HGB BLD-MCNC: 9.9 G/DL
INR PPP: 1.6
LYMPHOCYTES # BLD AUTO: 0.2 K/UL
LYMPHOCYTES NFR BLD: 3.1 %
MAGNESIUM SERPL-MCNC: 1.9 MG/DL
MAGNESIUM SERPL-MCNC: 2 MG/DL
MCH RBC QN AUTO: 32.4 PG
MCHC RBC AUTO-ENTMCNC: 32.6 G/DL
MCV RBC AUTO: 99 FL
MONOCYTES # BLD AUTO: 0.2 K/UL
MONOCYTES NFR BLD: 2.7 %
NEUTROPHILS # BLD AUTO: 7.1 K/UL
NEUTROPHILS NFR BLD: 94.2 %
OSMOLALITY UR: 351 MOSM/KG
PHOSPHATE SERPL-MCNC: 1.2 MG/DL
PHOSPHATE SERPL-MCNC: 1.3 MG/DL
PLATELET # BLD AUTO: 154 K/UL
PMV BLD AUTO: 7.8 FL
POTASSIUM SERPL-SCNC: 2.7 MMOL/L
POTASSIUM UR-SCNC: 18 MMOL/L
PROTHROMBIN TIME: 16.2 SEC
RBC # BLD AUTO: 3.05 M/UL
SODIUM SERPL-SCNC: 145 MMOL/L
SODIUM UR-SCNC: 116 MMOL/L
TROPONIN I SERPL DL<=0.01 NG/ML-MCNC: 0.09 NG/ML
VANCOMYCIN TROUGH SERPL-MCNC: 21.5 UG/ML
WBC # BLD AUTO: 7.6 K/UL

## 2017-10-30 PROCEDURE — 25000242 PHARM REV CODE 250 ALT 637 W/ HCPCS: Performed by: EMERGENCY MEDICINE

## 2017-10-30 PROCEDURE — 94640 AIRWAY INHALATION TREATMENT: CPT

## 2017-10-30 PROCEDURE — 25000242 PHARM REV CODE 250 ALT 637 W/ HCPCS: Performed by: INTERNAL MEDICINE

## 2017-10-30 PROCEDURE — 25000003 PHARM REV CODE 250: Performed by: INTERNAL MEDICINE

## 2017-10-30 PROCEDURE — 83735 ASSAY OF MAGNESIUM: CPT

## 2017-10-30 PROCEDURE — 84100 ASSAY OF PHOSPHORUS: CPT

## 2017-10-30 PROCEDURE — 83735 ASSAY OF MAGNESIUM: CPT | Mod: 91

## 2017-10-30 PROCEDURE — 25000003 PHARM REV CODE 250: Performed by: FAMILY MEDICINE

## 2017-10-30 PROCEDURE — 25000003 PHARM REV CODE 250: Performed by: NURSE PRACTITIONER

## 2017-10-30 PROCEDURE — 99222 1ST HOSP IP/OBS MODERATE 55: CPT | Mod: ,,, | Performed by: INTERNAL MEDICINE

## 2017-10-30 PROCEDURE — 63600175 PHARM REV CODE 636 W HCPCS: Performed by: FAMILY MEDICINE

## 2017-10-30 PROCEDURE — 36415 COLL VENOUS BLD VENIPUNCTURE: CPT

## 2017-10-30 PROCEDURE — 80069 RENAL FUNCTION PANEL: CPT

## 2017-10-30 PROCEDURE — 63600175 PHARM REV CODE 636 W HCPCS: Performed by: INTERNAL MEDICINE

## 2017-10-30 PROCEDURE — 80202 ASSAY OF VANCOMYCIN: CPT

## 2017-10-30 PROCEDURE — 27000221 HC OXYGEN, UP TO 24 HOURS

## 2017-10-30 PROCEDURE — 99900035 HC TECH TIME PER 15 MIN (STAT)

## 2017-10-30 PROCEDURE — 94799 UNLISTED PULMONARY SVC/PX: CPT

## 2017-10-30 PROCEDURE — 85025 COMPLETE CBC W/AUTO DIFF WBC: CPT

## 2017-10-30 PROCEDURE — 25000003 PHARM REV CODE 250: Performed by: EMERGENCY MEDICINE

## 2017-10-30 PROCEDURE — 99233 SBSQ HOSP IP/OBS HIGH 50: CPT | Mod: ,,, | Performed by: INTERNAL MEDICINE

## 2017-10-30 PROCEDURE — 12000002 HC ACUTE/MED SURGE SEMI-PRIVATE ROOM

## 2017-10-30 PROCEDURE — 84484 ASSAY OF TROPONIN QUANT: CPT

## 2017-10-30 PROCEDURE — 85610 PROTHROMBIN TIME: CPT

## 2017-10-30 PROCEDURE — 84133 ASSAY OF URINE POTASSIUM: CPT

## 2017-10-30 PROCEDURE — 94761 N-INVAS EAR/PLS OXIMETRY MLT: CPT

## 2017-10-30 PROCEDURE — 63600175 PHARM REV CODE 636 W HCPCS: Performed by: NURSE PRACTITIONER

## 2017-10-30 PROCEDURE — 83935 ASSAY OF URINE OSMOLALITY: CPT

## 2017-10-30 PROCEDURE — 84300 ASSAY OF URINE SODIUM: CPT

## 2017-10-30 RX ORDER — PROMETHAZINE HYDROCHLORIDE 12.5 MG/1
12.5 TABLET ORAL
Status: DISCONTINUED | OUTPATIENT
Start: 2017-10-31 | End: 2017-10-31 | Stop reason: HOSPADM

## 2017-10-30 RX ORDER — ALPRAZOLAM 0.25 MG/1
0.25 TABLET ORAL 3 TIMES DAILY
Status: DISCONTINUED | OUTPATIENT
Start: 2017-10-30 | End: 2017-10-31 | Stop reason: HOSPADM

## 2017-10-30 RX ORDER — METHYLPREDNISOLONE SOD SUCC 125 MG
80 VIAL (EA) INJECTION DAILY
Status: DISCONTINUED | OUTPATIENT
Start: 2017-10-31 | End: 2017-10-31 | Stop reason: HOSPADM

## 2017-10-30 RX ORDER — POTASSIUM CHLORIDE 7.45 MG/ML
10 INJECTION INTRAVENOUS
Status: COMPLETED | OUTPATIENT
Start: 2017-10-30 | End: 2017-10-30

## 2017-10-30 RX ADMIN — IPRATROPIUM BROMIDE AND ALBUTEROL SULFATE 3 ML: .5; 3 SOLUTION RESPIRATORY (INHALATION) at 08:10

## 2017-10-30 RX ADMIN — PIPERACILLIN SODIUM AND TAZOBACTAM SODIUM 3.38 G: 3; .375 INJECTION, POWDER, LYOPHILIZED, FOR SOLUTION INTRAVENOUS at 03:10

## 2017-10-30 RX ADMIN — POTASSIUM PHOSPHATE, MONOBASIC AND POTASSIUM PHOSPHATE, DIBASIC 20 MMOL: 224; 236 INJECTION, SOLUTION INTRAVENOUS at 10:10

## 2017-10-30 RX ADMIN — METHYLPREDNISOLONE SODIUM SUCCINATE 125 MG: 125 INJECTION, POWDER, FOR SOLUTION INTRAMUSCULAR; INTRAVENOUS at 02:10

## 2017-10-30 RX ADMIN — Medication 12.5 MG: at 10:10

## 2017-10-30 RX ADMIN — FUROSEMIDE 20 MG: 10 INJECTION, SOLUTION INTRAVENOUS at 05:10

## 2017-10-30 RX ADMIN — POTASSIUM CHLORIDE 10 MEQ: 10 INJECTION, SOLUTION INTRAVENOUS at 05:10

## 2017-10-30 RX ADMIN — FLUCONAZOLE 150 MG: 50 TABLET ORAL at 08:10

## 2017-10-30 RX ADMIN — PIPERACILLIN SODIUM AND TAZOBACTAM SODIUM 3.38 G: 3; .375 INJECTION, POWDER, LYOPHILIZED, FOR SOLUTION INTRAVENOUS at 08:10

## 2017-10-30 RX ADMIN — IPRATROPIUM BROMIDE AND ALBUTEROL SULFATE 3 ML: .5; 3 SOLUTION RESPIRATORY (INHALATION) at 03:10

## 2017-10-30 RX ADMIN — PANTOPRAZOLE SODIUM 20 MG: 20 TABLET, DELAYED RELEASE ORAL at 08:10

## 2017-10-30 RX ADMIN — IPRATROPIUM BROMIDE AND ALBUTEROL SULFATE 3 ML: .5; 3 SOLUTION RESPIRATORY (INHALATION) at 11:10

## 2017-10-30 RX ADMIN — Medication 4 MG: at 02:10

## 2017-10-30 RX ADMIN — METHYLPREDNISOLONE SODIUM SUCCINATE 125 MG: 125 INJECTION, POWDER, FOR SOLUTION INTRAMUSCULAR; INTRAVENOUS at 05:10

## 2017-10-30 RX ADMIN — IPRATROPIUM BROMIDE AND ALBUTEROL SULFATE 3 ML: .5; 3 SOLUTION RESPIRATORY (INHALATION) at 12:10

## 2017-10-30 RX ADMIN — FLUOXETINE 40 MG: 20 CAPSULE ORAL at 10:10

## 2017-10-30 RX ADMIN — FUROSEMIDE 20 MG: 10 INJECTION, SOLUTION INTRAVENOUS at 08:10

## 2017-10-30 RX ADMIN — EMTRICITABINE 200 MG: 200 CAPSULE ORAL at 10:10

## 2017-10-30 RX ADMIN — VANCOMYCIN HYDROCHLORIDE 1250 MG: 1 INJECTION, POWDER, LYOPHILIZED, FOR SOLUTION INTRAVENOUS at 10:10

## 2017-10-30 RX ADMIN — WARFARIN SODIUM 2.5 MG: 2.5 TABLET ORAL at 05:10

## 2017-10-30 RX ADMIN — VANCOMYCIN HYDROCHLORIDE 750 MG: 1 INJECTION, POWDER, LYOPHILIZED, FOR SOLUTION INTRAVENOUS at 02:10

## 2017-10-30 RX ADMIN — ALPRAZOLAM 0.25 MG: 0.25 TABLET ORAL at 10:10

## 2017-10-30 RX ADMIN — TENOFOVIR DISOPROXIL FUMARATE 300 MG: 300 TABLET, COATED ORAL at 10:10

## 2017-10-30 RX ADMIN — TOPIRAMATE 100 MG: 25 TABLET, FILM COATED ORAL at 10:10

## 2017-10-30 RX ADMIN — LISINOPRIL 5 MG: 2.5 TABLET ORAL at 08:10

## 2017-10-30 RX ADMIN — IPRATROPIUM BROMIDE AND ALBUTEROL SULFATE 3 ML: .5; 3 SOLUTION RESPIRATORY (INHALATION) at 04:10

## 2017-10-30 RX ADMIN — POTASSIUM CHLORIDE 10 MEQ: 10 INJECTION, SOLUTION INTRAVENOUS at 09:10

## 2017-10-30 RX ADMIN — Medication 12.5 MG: at 08:10

## 2017-10-30 RX ADMIN — IPRATROPIUM BROMIDE AND ALBUTEROL SULFATE 3 ML: .5; 3 SOLUTION RESPIRATORY (INHALATION) at 07:10

## 2017-10-30 RX ADMIN — POTASSIUM CHLORIDE 10 MEQ: 10 INJECTION, SOLUTION INTRAVENOUS at 08:10

## 2017-10-30 RX ADMIN — FLUTICASONE FUROATE AND VILANTEROL TRIFENATATE 1 PUFF: 100; 25 POWDER RESPIRATORY (INHALATION) at 07:10

## 2017-10-30 RX ADMIN — EFAVIRENZ 600 MG: 600 TABLET, FILM COATED ORAL at 10:10

## 2017-10-30 NOTE — PLAN OF CARE
"   10/30/17 1445   Readmission Questionnaire   At the time of your discharge, did someone talk to you about what your health problems were? Yes   At the time of discharge, did someone talk to you about what to watch out for regarding worsening of your health problem? Yes   At the time of discharge, did someone talk to you about what to do if you experienced worsening of your health problem? Yes   At the time of discharge, did someone talk to you about which medication to take when you left the hospital and which ones to stop taking? Yes   At the time of discharge, did someone talk to you about when and where to follow up with a doctor after you left the hospital? Yes   What do you believe caused you to be sick enough to be re-admitted? ("congestion")   How often do you need to have someone help you when you read instructions, pamphlets, or other written material from your doctor or pharmacy? Always   Do you have problems taking your medications as prescribed? No   Do you have any problems affording any of  your prescribed medications? No   Do you have problems obtaining/receiving your medications? No   Does the patient have transportation to healthcare appointments? Yes   Lives With other relative(s)  (pt's blind sister Nieves and pt's nephew Burak)   Living Arrangements house   Does the patient have family/friends to help with healtcare needs after discharge? yes   Who are your caregiver(s) and their phone number(s)? (patrick Sumner h# [phone]/w# [phone])   Does your caregiver provide all the help you need? Yes   Are you currently feeling confused? Yes   Are you currently having problems thinking? Yes   Are you currently having memory problems? Yes   Have you felt down, depressed, or hopeless? 3   Have you felt little interest or pleasure in doing things? 3   In the last 7 days, my sleep quality was: very poor     "

## 2017-10-30 NOTE — PLAN OF CARE
Problem: Infection, Risk/Actual (Adult)  Intervention: Manage Suspected/Actual Infection   10/30/17 0036   Safety Interventions   Infection Management other (see comments)  (abx given as ordered)     Intervention: Prevent Infection/Maximize Resistance   10/30/17 0036   Respiratory Interventions   Airway/Ventilation Management airway patency maintained   Pain/Comfort/Sleep Interventions   Sleep/Rest Enhancement awakenings minimized;family presence promoted       Goal: Identify Related Risk Factors and Signs and Symptoms  Related risk factors and signs and symptoms are identified upon initiation of Human Response Clinical Practice Guideline (CPG)   10/30/17 0036   Infection, Risk/Actual   Related Risk Factors (Infection, Risk/Actual) chronic illness/condition   Signs and Symptoms (Infection, Risk/Actual) nausea;pain;heart rate increase     Goal: Infection Prevention/Resolution  Patient will demonstrate the desired outcomes by discharge/transition of care.   10/30/17 0036   Infection, Risk/Actual (Adult)   Infection Prevention/Resolution making progress toward outcome

## 2017-10-30 NOTE — CONSULTS
"  10/30/2017      Admit Date: 10/29/2017  Boni OJEDA Yann  New Patient Consult    Chief Complaint   Patient presents with    Shortness of Breath       History of Present Illness:  Pt with severe copd and h/o recent lung ca s/p xrt,  He has hiv on rx.    Pt has been very miserable with freq nausea and chr dyspnea.  Pt has caregiver and wishes to go on hospice.      No cough or mucous production,  Feels anxious and nauseated now.      PFSH:  Past Medical History:   Diagnosis Date    Arthritis     Asbestos exposure     COPD (chronic obstructive pulmonary disease)     O2 AT NITE PRN    Coronary artery disease     STENT X 1    Depression     HIV (human immunodeficiency virus infection)     Hypertension     NO MED NOW    Migraines     Myocardial infarction     Presence of dental bridge     UPPER AND LOWER    RLS (restless legs syndrome)     Wears glasses      Past Surgical History:   Procedure Laterality Date    CORONARY STENT PLACEMENT      FACIAL FRACTURE SURGERY  metal in left brow and cheek    's    finger scraped Right     index finger scraped at [facility] for staph infection    rectal warts  2013    warts removed       Social History   Substance Use Topics    Smoking status: Current Every Day Smoker     Packs/day: 0.25     Years: 50.00     Types: Cigarettes     Last attempt to quit: 2015    Smokeless tobacco: Never Used      Comment: Pt states "already have all the information"    Alcohol use No     Family History   Problem Relation Age of Onset    COPD Mother     Cancer Mother       of lung ca w/mets to stomach    Asbestos Mother     Vision loss Sister     Blindness Sister      with bilatereal removal     Depression Sister     Cancer Brother      lung    COPD Brother     Stroke Maternal Aunt     Heart disease Maternal Grandmother      pacemaker    Stroke Maternal Grandmother      Review of patient's allergies indicates:   Allergen Reactions    Unable to assess Other " "(See Comments)     REX (DRUG FOR HIV) TAKEN 1989 ALMOST PARALYZED LEGS    Aztreonam Nausea And Vomiting        Review of Systems:  a review of eleven systems covering constitutional, Psych, Eye, HEENT, Respiratory, Cardiac, GI, , Musculoskeletal, Endocrine, Dermatologicwas negative except the above mentioned abnormalities and for any pertinent findings as listed below: weak , nausea, anxios       Exam:Comprehensive exam done. /72   Pulse 71   Temp 97.4 °F (36.3 °C) (Oral)   Resp 16   Ht 5' 9" (1.753 m)   Wt 54.9 kg (121 lb)   SpO2 98%   BMI 17.87 kg/m²   Exam included Vitals as listed, and patient's appearance and affect and alertness and mood, oral exam for yeast and hygiene and pharynx lesions and Mallapatti (M) score, neck with inspection for jvd and masses and thyroid abnormalities and lymph nodes (supraclavicular and infraclavicular nodes also examined and noted if abn), chest exam included symmetry and effort and fremitus and percussion and auscultation, cardiac exam included rhythm and gallops and murmur and rubs and jvd and edema, abdominal exam for mass and hepatosplenomegaly and tenderness and hernias and bowel sounds, Musculoskeletal exam with muscle tone and posture and mobility/gait and  strenght, and skin for rashes and cyanosis and pallor and turgor, extremity for clubbing.  Findings were normal except as listed below:  Frail, no distress, freq bruising, cachexia, no distress, chest symmetric, nl fremitus/percussion, good bs, nl cor, no hs megaly or mass, no clubbing or edema    Radiographs reviewed: view by direct vision  - severelty opacified left lung.  Results for orders placed during the hospital encounter of 10/29/17   X-Ray Chest 1 View    Narrative AP chest compared to 10/27/17    Findings: There is confluent opacification of the left mid lower lung zone, with hazy opacification of the left upper lung zone. Leftward mediastinal shift is present. The right lung " demonstrates hyperexpansion and interstitial change in keeping with COPD.    Impression Persistent extensive left lung opacification likely due to combination of pleural effusion and atelectasis with underlying consolidation not excluded.  COPD.      Electronically signed by: Ramsey Monique MD  Date:     10/29/17  Time:    14:15    ]    Labs       Recent Labs  Lab 10/30/17  0852   WBC 7.60   HGB 9.9*   HCT 30.3*          Recent Labs  Lab 10/30/17  0316 10/30/17  0852     --    K 2.7*  --    CL 97  --    CO2 39*  --    BUN 25*  --    CREATININE 0.8  --    *  --    CALCIUM 8.2*  --    MG 2.0 1.9   PHOS 1.3* 1.2*   ALBUMIN 2.5*  --    INR  --  1.6*   TROPONINI 0.090*  --    No results for input(s): PH, PCO2, PO2, HCO3 in the last 24 hours.  Microbiology Results (last 7 days)     Procedure Component Value Units Date/Time    Culture, Respiratory with Gram Stain [[id number]]     Order Status:  No result Specimen:  Respiratory           Impression:  Active Hospital Problems    Diagnosis  POA    *Acute on chronic respiratory failure with hypoxia and hypercapnia [J96.21, J96.22]  Yes    Malignant neoplasm of lower lobe of left lung [C34.32]  Unknown    Radiation pneumonitis [J70.0]  Yes    COPD with acute exacerbation [J44.1]  Yes      Resolved Hospital Problems    Diagnosis Date Resolved POA   No resolved problems to display.               Plan:  Pt needs comfort care per his directive,   Will add scheduled xanax and promethazine given anxiety and nausea.  He should be on hospice soon.  Pt may have role of xrt pneumointis- steroids might help.  Continue copd rx.

## 2017-10-30 NOTE — CONSULTS
Chart reviewed. I think he just wanted to say good bye  We discussed hospice. I contacted the  of his choice.  I told him he didn't have to take any of his HIV meds anymore if he didn't want to(not to fret about missing them). Agree with focus on comfort care.   thanks

## 2017-10-30 NOTE — NURSING
Patient had another run of PAT - complex looks a littler wider now- 10 beat run.  ALIZA Suarez NP notified via [employer].  New orders received.  Patient remains asleep- no acute resp distress.

## 2017-10-30 NOTE — PHYSICIAN QUERY
"PT Name: Boni Lerner  MR #: [id number]    Physician Query Form - Heart  Condition Clarification     CDS: Johanny Ulloa RN CCDS             Contact information:[email or web address]  This form is a permanent document in the medical record.     Query Date: October 30, 2017    By submitting this query, we are merely seeking further clarification of documentation. Please utilize your independent clinical judgment when addressing the question(s) below.    The medical record contains the following   Indicators     Supporting Clinical Findings Location in Medical Record   X BNP BNP= 1671 LAB 10/29    EF     X Radiology findings Large left pleural effusion with compressive atelectasis with underlying consolidation not excluded. COPD. Xray 10/27    Echo Results      "Ascites" documented     X "SOB" or "CLANCY" documented  shortness of breath  H&P    "Hypoxia" documented     X Heart Failure documented  heart failure H&P   X "Edema" documented Minimal peripheral edema ED note 10/29   X Diuretics/Meds Lasix IV  Lisinopril Po  Metoprolol Po MAR 10/30  MAR 12/30  MAR 12/29    Treatment:      Other:          Provider, please specify diagnosis or diagnoses associated with above clinical findings.                               [  ] Acute Systolic Heart Failure ( EF < 40)*  [  ] Acute on Chronic Systolic Heart Failure ( EF < 40)*  [  ] Acute Diastolic Heart Failure ( EF > 40)*  [  ] Acute on Chronic Diastolic Heart Failure( EF > 40)*  [  ] Acute Combined Systolic and Diastolic Heart Failure  [ x ] Acute on Chronic Combined Systolic and Diastolic Heart Failure  [  ] Other Type of Heart Failure (please specify type): _________________________  [  ] Heart Failure Ruled Out  [  ] Other (please specify): ___________________________________  [  ] Clinically Undetermined            *American Heart Association                                                                                                          Please document in your progress " notes daily for the duration of treatment until resolved and include in your discharge summary.

## 2017-10-30 NOTE — NURSING
Patient c/o chest pressure.  2 ntg given sl per C. Pallagio, RN per protocol.  EKG obtained.  ALIZA Suarez NP notified.  Oxygen on.  See vs. ST on tele.

## 2017-10-30 NOTE — NURSING
Results for POP SCHAFER (MRN [id number]) as of 10/30/2017 05:30   Ref. Range 10/30/2017 03:16   Potassium Latest Ref Range: 3.5 - 5.1 mmol/L 2.7 (LL)   HR dropped down into the 30's briefly.  ALIZA Suarez, NP informed of bradycardia and above critical lab.  New orders received.  Patient was asleep.  Arouses easily.  No acute complaints.  Will continue to monitor.

## 2017-10-30 NOTE — PLAN OF CARE
Met with pt to complete his assessment.  Educated pt on the blue discharge folder and left the folder in the room. Pt, who needs assistance with her ADL's lives with his blind sister Nieves and his nephew Burak.  Pt denies home health services.  Pt has a BSC, SC, wheelchair, rollator, oxygen and nebulizer at home.  His PCP is Dr. Rosas, primary insurance is [employer] and secondary insurance is Medicaid of [place].  Pt verified that the physician has spoken to him regarding hospice.  Pt stated that he has had hospice services with [facility] in the past and would like to sign up for hospice again.  Obtained verbal consent from pt for the disclosure form.  Pt stated that he is ready to go home and verified okay for me to call his nephew to speak to him regarding hospice.      3:03 p.m. - Phone contact with patrick Sumner, [phone] to discuss hospice.  Nephew in agreement with hospice services.  Updated Jade TEJADA.      10/30/17 3497   Discharge Assessment   Assessment Type Discharge Planning Assessment   Confirmed/corrected address and phone number on facesheet? Yes   Assessment information obtained from? Patient   Prior to hospitilization cognitive status: Alert/Oriented   Prior to hospitalization functional status: Needs Assistance;Assistive Equipment   Current cognitive status: Alert/Oriented   Current Functional Status: Assistive Equipment;Needs Assistance   Lives With other relative(s)  (blind sister Nieves and pt's nephew Burak)   Able to Return to Prior Arrangements unable to determine at this time (comments)   Is patient able to care for self after discharge? Unable to determine at this time (comments)   Who are your caregiver(s) and their phone number(s)? (patrick Sumner Akilah, yan# [phone]/w# [phone])   Patient's perception of discharge disposition hospice/home   Readmission Within The Last 30 Days previous discharge plan unsuccessful   If yes, most recent facility name: ([facility])   Patient  currently being followed by outpatient case management? No   Patient currently receives any other outside agency services? No   Equipment Currently Used at Home bedside commode;nebulizer;oxygen;rollator;wheelchair   Do you have any problems affording any of your prescribed medications? No  (pharmacy is [facility] on [address])   Is the patient taking medications as prescribed? yes   Does the patient have transportation home? Yes   Transportation Available family or friend will provide   Does the patient receive services at the [facility]? No   Discharge Plan A Hospice/home   Patient/Family In Agreement With Plan yes

## 2017-10-30 NOTE — PLAN OF CARE
Problem: Patient Care Overview  Goal: Plan of Care Review  Outcome: Ongoing (interventions implemented as appropriate)  Pt oriented x4. Vitals stable. voids per urinal. 4L/ O2 family at bedside.

## 2017-10-30 NOTE — PLAN OF CARE
10/30/17 0727   Patient Assessment/Suction   Level of Consciousness (AVPU) alert   Respiratory Effort Unlabored;Normal   Expansion/Accessory Muscles/Retractions no use of accessory muscles;no retractions   All Lung Fields Breath Sounds crackles coarse   Rhythm/Pattern, Respiratory unlabored   Cough Frequency infrequent   Cough Type nonproductive   PRE-TX-O2-ETCO2   O2 Device (Oxygen Therapy) nasal cannula   $ Is the patient on Low Flow Oxygen? Yes   Flow (L/min) 4   Oxygen Concentration (%) 36   SpO2 96 %   Pulse Oximetry Type Intermittent   Pulse 88   Resp 16   Positioning Sitting in bed   Aerosol Therapy   $ Aerosol Therapy Charges Aerosol Treatment   Respiratory Treatment Status given   SVN/Inhaler Treatment Route mask   Position During Treatment Sitting in bed   Patient Tolerance good   Inhaler   $ Inhaler Charges MDI (Metered Dose Inahler) Treatment   Respiratory Treatment Status given   SVN/Inhaler Treatment Route mouthpiece   Patient Tolerance good   Post-Treatment   Post-treatment Heart Rate (beats/min) 86   Post-treatment Resp Rate (breaths/min) 16   All Fields Breath Sounds aeration increased   Incentive Spirometer   $ Incentive Spirometer Charges done with encouragement   Administration (Incentive Spirometer) done with encouragement   Number of Repetitions (Incentive Spirometer) 5   Level (mL) (Incentive Spirometer) 500   Patient Tolerance fair   Ready to Wean/Extubation Screen   FIO2<=50 (chart decimal) 0.36

## 2017-10-30 NOTE — PHYSICIAN QUERY
PT Name: Boni Lerner  MR #: [id number]     Physician Query Form - Documentation Clarification      CDS/: Eunice Mccloud  RN CCDS             Contact information:  [email or web address]    This form is a permanent document in the medical record.     Query Date: October 30, 2017    By submitting this query, we are merely seeking further clarification of documentation. Please utilize your independent clinical judgment when addressing the question(s) below.    The Medical record reflects the following:    Supporting Clinical Findings Location in Medical Record     HIV (human immunodeficiency virus infection)  Chronic. Continue home HIV medications.       H&P     HCAP (healthcare-associated pneumonia)     Patient with positive Candida noted in the sputum and respiratory culture.  Continue fluconazole for 7 day tx.        Severe malnutrition  Radiation pneumonitis  Acute CHF       D/S                                                                            Please further specify if there is any relation between the pt's HIV status and Pneumonia.  Thank you.    Provider Use Only      [  x ] HCAP (with positive candida noted in sputum) related to pt's HIV    [     ] HCAP (with positive candida noted in sputum) not related to pt's HIV    [     ] Other________________________________                                                                                                                     [  ] Clinically undetermined

## 2017-10-30 NOTE — CONSULTS
Food & Nutrition  Education    Diet Education: Vitamin K/Coumadin interaction  Time Spent: 15 mins  Learners: patient       Nutrition Education provided with handouts: Vitamin K and medication interaction      Comments: Educated patient on vitamin K and coumadin interaction. Reviewed foods high/moderate/low in vitamin K. Emphasized importance of keeping vitamin K intake consistent. Provided nutrition handout with information reviewed.All questions and concerns answered. Dietitian's contact information provided.       Follow-Up: x1 weekly    Please Re-consult as needed

## 2017-10-30 NOTE — NURSING
Chest pain relieved with two NTG's.  Patient medicated for nausea.  freq coughing with thick sputum production.  Family at bedside.  resp tx given.  Will monitor.

## 2017-10-30 NOTE — CONSULTS
Boni Lerner [id number] is a 72 y.o. male who has been consulted for vancomycin dosing.    The patient has the following labs:     Date Creatinine (mg/dl)    WBC Count   10/30/2017 Estimated Creatinine Clearance: 64.8 mL/min (based on SCr of 0.8 mg/dL). Lab Results   Component Value Date    WBC 7.60 10/30/2017        Current weight is 54.9 kg (121 lb)    Pt is receiving vancomycin 750 mg every 12 hours. Vancomycin trough was ordered prior to the 3rd dose to assess clearance and resulted at 21.5 mg/dL.  Target goal is 15-20 mg/dL.    Trough was drawn on time and anticipate it is  Supratherapeutic. Pharmacy will decrease current dose.   The patient will be changed to a vancomycin dose of 1250 mg every 24 hours. The vancomycin trough has been ordered for 11/1 at 2130.  Patient will be followed by pharmacy for changes in renal function, toxicity, and efficacy.    Thank you for allowing us to participate in this patient's care.     Citlalli Juarez, PharmD

## 2017-10-30 NOTE — PLAN OF CARE
10/29/17 2024   Patient Assessment/Suction   Level of Consciousness (AVPU) alert   Respiratory Effort Normal;Unlabored   Expansion/Accessory Muscles/Retractions expansion symmetric;no retractions;no use of accessory muscles   All Lung Fields Breath Sounds coarse   Cough Frequency infrequent   Cough Type nonproductive   PRE-TX-O2-ETCO2   O2 Device (Oxygen Therapy) nasal cannula   Flow (L/min) 5   Oxygen Concentration (%) 40   SpO2 95 %   Pulse 94   Resp (!) 21   Aerosol Therapy   $ Aerosol Therapy Charges Aerosol Treatment   Respiratory Treatment Status given   SVN/Inhaler Treatment Route mask;with oxygen   Position During Treatment HOB at 45 degrees   Patient Tolerance good   Post-Treatment   Post-treatment Heart Rate (beats/min) 98   Post-treatment Resp Rate (breaths/min) 24   All Fields Breath Sounds unchanged   Incentive Spirometer   $ Incentive Spirometer Charges other (see comments)  (held; pt nauseated)   Administration (Incentive Spirometer) other (see comments)  (held; pt nauseated)   Ready to Wean/Extubation Screen   FIO2<=50 (chart decimal) 0.4

## 2017-10-30 NOTE — PLAN OF CARE
Problem: Nausea/Vomiting (Adult)  Intervention: Position to Prevent Aspiration   10/30/17 0038   Positioning   Head of Bed (HOB) HOB at 30-45 degrees   Body Position lower extremity elevated, left;lower extremity elevated, right;side-lying, left

## 2017-10-30 NOTE — NURSING
"MT contacted me with report of "9 beat run V-Tach".  Patient is asleep.  EKG strip in chart.  Does not appear to be V-tach- complex is narrow.  ?PAT.  Will continue to monitor.  "

## 2017-10-30 NOTE — PROGRESS NOTES
"Progress Note  Hospital Medicine  Patient Name:Boni Lerner  MRN:  [id number]  Patient Class: IP- Inpatient  Admit Date: 10/29/2017  Length of Stay: 1 days  Expected Discharge Date:   Attending Physician: Analilia Nolasco MD  Primary Care Provider:  Gilberto Rosas MD    SUBJECTIVE:     Principal Problem: SOBInitial history of present illness: Male with current medical history of COPD, and just of heart failure, coronary artery disease, HIV, hypertension, prior history of MI and left lung cancer presents to the emergency room today with chief complaint of shortness of breath.  Patient was admitted to the hospital Saturday morning area patient presented in respiratory distress.  Patient was admitted to the ICU.  Patient became very combative during hospitalization and left AMA.  Patient has returned today with ongoing shortness of breath.  Patient has been using albuterol treatments at home which have not alleviated his symptoms.  Patient is DO NOT RESUSCITATE/DO NOT INTUBATE.  Patient is agreeable to BiPAP when needed.  Patient has a history of noncompliance.  Patient is not exhibiting any aggressive behavior at the current time.    PMH/PSH/SH/FH/Meds: reviewed.    Symptoms/Review of Systems: Flat affect. " I am ready to die". Patient wants to go home. Still SOB. No chest pain or headache, fever or abdominal pain.     Diet:  Adequate intake.    Activity level: up with assistance  Pain:  Patient reports no pain.       OBJECTIVE:   Vital Signs (Most Recent):      Temp: 97.5 °F (36.4 °C) (10/30/17 0702)  Pulse: 88 (10/30/17 0727)  Resp: 16 (10/30/17 0727)  BP: (!) 140/64 (10/30/17 0702)  SpO2: 96 % (10/30/17 0727)       Vital Signs Range (Last 24H):  Temp:  [96.6 °F (35.9 °C)-98.4 °F (36.9 °C)]   Pulse:  []   Resp:  [16-22]   BP: (103-152)/(59-81)   SpO2:  [85 %-100 %]     Weight: 54.9 kg (121 lb)  Body mass index is 17.87 kg/m².    Intake/Output Summary (Last 24 hours) at 10/30/17 0833  Last data filed at " 10/30/17 0519   Gross per 24 hour   Intake              550 ml   Output             1420 ml   Net             -870 ml     Physical Examination:  Constitutional: He appears well-developed and well-nourished. He is not diaphoretic. No distress.   HENT:   Head: Normocephalic and atraumatic.   Mouth/Throat: Oropharynx is clear and moist.   Eyes: Conjunctivae are normal.   Neck: Neck supple.   Cardiovascular: Normal rate, regular rhythm, normal heart sounds and intact distal pulses. Exam reveals no gallop and no friction rub.    No murmur heard.  Pulmonary/Chest: .    inspiratory and expiratory wheezes scattered rhonchi    Abdominal: Soft. He exhibits no distension. There is no tenderness.   Musculoskeletal: Normal range of motion.    Neurological: He is alert and oriented to person, place, and time. No cranial nerve deficit or sensory deficit.   Skin: No rash noted. No erythema.   Psychiatric: He has a normal mood and affect. His behavior is normal. Judgment and thought content normal.     CBC:    Recent Labs  Lab 10/27/17  2053 10/28/17  0336 10/29/17  1337   WBC 10.40 8.30 13.40*   RBC 3.54* 3.03* 3.17*   HGB 11.3* 9.6* 10.1*   HCT 36.4* 31.2* 32.0*    138* 184   * 103* 101*   MCH 31.8* 31.5* 31.7*   MCHC 31.0* 30.6* 31.4*   BMP    Recent Labs  Lab 10/28/17  0336 10/29/17  1337 10/30/17  0316   * 84 148*   * 150* 145   K 3.2* 3.5 2.7*    103 97   CO2 36* 39* 39*   BUN 29* 29* 25*   CREATININE 0.9 0.7 0.8   CALCIUM 8.3* 8.7 8.2*   MG 2.0  --  2.0      Diagnostic Results:  Microbiology Results (last 7 days)     ** No results found for the last 168 hours. **         CXR: Large left pleural effusion with compressive atelectasis with underlying consolidation not excluded. COPD.    CXR: Persistent extensive left lung opacification likely due to combination of pleural effusion and atelectasis with underlying consolidation not excluded.  COPD.    Assessment/Plan:     Acute on chronic hypoxic  hypercarbic respiratory failure  Chronic obstructive pulmonary disease with acute exacerbation  HCAP- left lower lobe with effusion  O2 therapy. Use BiPAP, follow pulmonary recommendations.  Needs left sided throacentesis.  Nebulizers  Steroids  Continue IV antibiotics - Vancomycin and Zosyn.  Monitor for an improvement in condition     Chronic Systolic CHF exacerbation  CHF currently controlled and monitor clinical status closely. Monitor on telemetry. Last BNP is   Recent Labs  Lab 10/29/17  1731   BNP 1,671*   Continue to stress to patient importance of self efficacy and  on diet for CHF     Essential hypertension-Controlled  Chronic. Continue home BP medications and adjust as needed.    Hypernatremia - better  Follow BMP.      Tobacco abuse  Smoking cessation encouraged  Nicotine patch     HIV (human immunodeficiency virus infection)  Chronic. Continue home HIV medications.    Hypokalemia  Replete KCl. Follow BMP.    Hypophosphatemia  Replete Phosphorus. Follow level     Severe Malnutrition  Severe. Nutrition consulted. Encourage maximal PO intake. Diet supplementation ordered per nutrition approval. Will encourage PO and monitor closely for weight changes.    Code status: DNI/DNR.  I had a long discussion with patient lisandro was on Hospice care previously and now would like to go home with hospice again. Discussed with  and  will assist with home hospice arrangements as per patient's wishes. Time spent in care of the patient, counseling and coordination of care (Greater than 50% spent in direct face to face contact): 35 min.    VTE Risk Mitigation         Ordered     warfarin (COUMADIN) tablet 2.5 mg  Daily     Route:  Oral        10/29/17 1650     Low Risk of VTE  Once      10/29/17 1650        Analilia Nolasco MD  Department of Hospital Medicine   [facility]

## 2017-10-30 NOTE — PLAN OF CARE
Problem: Patient Care Overview  Goal: Plan of Care Review  Outcome: Ongoing (interventions implemented as appropriate)  POC reviewed with pt, understanding verbalized. Repeated request. Sinus arrhythmia on tele. Safety maintained. Will continue to monitor.

## 2017-10-30 NOTE — NURSING
Notified of 21 beat of vtach via monitor room. Notified Dr. Nolasco. No new orders received. Strip placed in chart.

## 2017-10-31 VITALS
BODY MASS INDEX: 17.92 KG/M2 | HEART RATE: 70 BPM | RESPIRATION RATE: 16 BRPM | OXYGEN SATURATION: 97 % | TEMPERATURE: 97 F | WEIGHT: 121 LBS | SYSTOLIC BLOOD PRESSURE: 135 MMHG | HEIGHT: 69 IN | DIASTOLIC BLOOD PRESSURE: 68 MMHG

## 2017-10-31 LAB
ANION GAP SERPL CALC-SCNC: 9 MMOL/L
BASOPHILS # BLD AUTO: 0 K/UL
BASOPHILS NFR BLD: 0 %
BUN SERPL-MCNC: 21 MG/DL
CALCIUM SERPL-MCNC: 8.1 MG/DL
CHLORIDE SERPL-SCNC: 93 MMOL/L
CO2 SERPL-SCNC: 42 MMOL/L
CREAT SERPL-MCNC: 0.8 MG/DL
DIFFERENTIAL METHOD: ABNORMAL
EOSINOPHIL # BLD AUTO: 0 K/UL
EOSINOPHIL NFR BLD: 0 %
ERYTHROCYTE [DISTWIDTH] IN BLOOD BY AUTOMATED COUNT: 17.3 %
EST. GFR  (AFRICAN AMERICAN): >60 ML/MIN/1.73 M^2
EST. GFR  (NON AFRICAN AMERICAN): >60 ML/MIN/1.73 M^2
GLUCOSE SERPL-MCNC: 113 MG/DL
HCT VFR BLD AUTO: 29.3 %
HGB BLD-MCNC: 9.6 G/DL
HIV UQ DATE RECEIVED: NORMAL
HIV UQ DATE REPORTED: NORMAL
HIV1 RNA # SERPL NAA+PROBE: <40 COPIES/ML
HIV1 RNA SERPL NAA+PROBE-LOG#: <1.6 LOG (10) COPIES/ML
HIV1 RNA SERPL QL NAA+PROBE: NOT DETECTED
INR PPP: 2.3
LYMPHOCYTES # BLD AUTO: 0.7 K/UL
LYMPHOCYTES NFR BLD: 7.6 %
MAGNESIUM SERPL-MCNC: 1.8 MG/DL
MCH RBC QN AUTO: 32.2 PG
MCHC RBC AUTO-ENTMCNC: 32.9 G/DL
MCV RBC AUTO: 98 FL
MONOCYTES # BLD AUTO: 0.3 K/UL
MONOCYTES NFR BLD: 3.1 %
NEUTROPHILS # BLD AUTO: 7.7 K/UL
NEUTROPHILS NFR BLD: 89.3 %
PHOSPHATE SERPL-MCNC: 2.6 MG/DL
PLATELET # BLD AUTO: 158 K/UL
PMV BLD AUTO: 8.2 FL
POTASSIUM SERPL-SCNC: 2.7 MMOL/L
PROTHROMBIN TIME: 23.6 SEC
RBC # BLD AUTO: 2.99 M/UL
SODIUM SERPL-SCNC: 144 MMOL/L
WBC # BLD AUTO: 8.6 K/UL

## 2017-10-31 PROCEDURE — 94640 AIRWAY INHALATION TREATMENT: CPT

## 2017-10-31 PROCEDURE — 63600175 PHARM REV CODE 636 W HCPCS: Performed by: FAMILY MEDICINE

## 2017-10-31 PROCEDURE — 99239 HOSP IP/OBS DSCHRG MGMT >30: CPT | Mod: ,,, | Performed by: INTERNAL MEDICINE

## 2017-10-31 PROCEDURE — 84100 ASSAY OF PHOSPHORUS: CPT

## 2017-10-31 PROCEDURE — 99900035 HC TECH TIME PER 15 MIN (STAT)

## 2017-10-31 PROCEDURE — 25000003 PHARM REV CODE 250: Performed by: EMERGENCY MEDICINE

## 2017-10-31 PROCEDURE — 83735 ASSAY OF MAGNESIUM: CPT

## 2017-10-31 PROCEDURE — 85610 PROTHROMBIN TIME: CPT

## 2017-10-31 PROCEDURE — 80048 BASIC METABOLIC PNL TOTAL CA: CPT

## 2017-10-31 PROCEDURE — 63600175 PHARM REV CODE 636 W HCPCS: Performed by: INTERNAL MEDICINE

## 2017-10-31 PROCEDURE — 25000003 PHARM REV CODE 250: Performed by: INTERNAL MEDICINE

## 2017-10-31 PROCEDURE — 63600175 PHARM REV CODE 636 W HCPCS: Performed by: NURSE PRACTITIONER

## 2017-10-31 PROCEDURE — 25000242 PHARM REV CODE 250 ALT 637 W/ HCPCS: Performed by: INTERNAL MEDICINE

## 2017-10-31 PROCEDURE — 36415 COLL VENOUS BLD VENIPUNCTURE: CPT

## 2017-10-31 PROCEDURE — 94761 N-INVAS EAR/PLS OXIMETRY MLT: CPT

## 2017-10-31 PROCEDURE — 85025 COMPLETE CBC W/AUTO DIFF WBC: CPT

## 2017-10-31 PROCEDURE — 27000221 HC OXYGEN, UP TO 24 HOURS

## 2017-10-31 RX ORDER — POTASSIUM CHLORIDE 20 MEQ/15ML
40 SOLUTION ORAL DAILY
Status: DISCONTINUED | OUTPATIENT
Start: 2017-10-31 | End: 2017-10-31 | Stop reason: HOSPADM

## 2017-10-31 RX ORDER — ALPRAZOLAM 0.25 MG/1
0.25 TABLET ORAL 3 TIMES DAILY
Qty: 10 TABLET | Refills: 0 | Status: SHIPPED | OUTPATIENT
Start: 2017-10-31 | End: 2017-11-30

## 2017-10-31 RX ORDER — POTASSIUM CHLORIDE 20 MEQ/1
40 TABLET, EXTENDED RELEASE ORAL DAILY
Status: DISCONTINUED | OUTPATIENT
Start: 2017-10-31 | End: 2017-10-31

## 2017-10-31 RX ORDER — POTASSIUM CHLORIDE 7.45 MG/ML
10 INJECTION INTRAVENOUS ONCE
Status: COMPLETED | OUTPATIENT
Start: 2017-10-31 | End: 2017-10-31

## 2017-10-31 RX ORDER — PROMETHAZINE HYDROCHLORIDE 12.5 MG/1
12.5 TABLET ORAL
Qty: 30 TABLET | Refills: 0 | Status: SHIPPED | OUTPATIENT
Start: 2017-10-31

## 2017-10-31 RX ORDER — AMOXICILLIN AND CLAVULANATE POTASSIUM 875; 125 MG/1; MG/1
1 TABLET, FILM COATED ORAL 2 TIMES DAILY
Qty: 20 TABLET | Refills: 0 | Status: SHIPPED | OUTPATIENT
Start: 2017-10-31 | End: 2017-11-10

## 2017-10-31 RX ORDER — LEVOFLOXACIN 500 MG/1
500 TABLET, FILM COATED ORAL DAILY
Qty: 7 TABLET | Refills: 0 | Status: SHIPPED | OUTPATIENT
Start: 2017-10-31 | End: 2017-11-10

## 2017-10-31 RX ADMIN — PIPERACILLIN SODIUM AND TAZOBACTAM SODIUM 3.38 G: 3; .375 INJECTION, POWDER, LYOPHILIZED, FOR SOLUTION INTRAVENOUS at 01:10

## 2017-10-31 RX ADMIN — PROMETHAZINE HYDROCHLORIDE 12.5 MG: 12.5 TABLET ORAL at 06:10

## 2017-10-31 RX ADMIN — POTASSIUM PHOSPHATE, MONOBASIC AND POTASSIUM PHOSPHATE, DIBASIC 30 MMOL: 224; 236 INJECTION, SOLUTION INTRAVENOUS at 11:10

## 2017-10-31 RX ADMIN — PIPERACILLIN SODIUM AND TAZOBACTAM SODIUM 3.38 G: 3; .375 INJECTION, POWDER, LYOPHILIZED, FOR SOLUTION INTRAVENOUS at 08:10

## 2017-10-31 RX ADMIN — POTASSIUM CHLORIDE 10 MEQ: 10 INJECTION, SOLUTION INTRAVENOUS at 06:10

## 2017-10-31 RX ADMIN — PIPERACILLIN SODIUM AND TAZOBACTAM SODIUM 3.38 G: 3; .375 INJECTION, POWDER, LYOPHILIZED, FOR SOLUTION INTRAVENOUS at 02:10

## 2017-10-31 RX ADMIN — IPRATROPIUM BROMIDE AND ALBUTEROL SULFATE 3 ML: .5; 3 SOLUTION RESPIRATORY (INHALATION) at 11:10

## 2017-10-31 RX ADMIN — FUROSEMIDE 20 MG: 10 INJECTION, SOLUTION INTRAVENOUS at 08:10

## 2017-10-31 RX ADMIN — METHYLPREDNISOLONE SODIUM SUCCINATE 80 MG: 125 INJECTION, POWDER, FOR SOLUTION INTRAMUSCULAR; INTRAVENOUS at 08:10

## 2017-10-31 RX ADMIN — ALPRAZOLAM 0.25 MG: 0.25 TABLET ORAL at 06:10

## 2017-10-31 RX ADMIN — IPRATROPIUM BROMIDE AND ALBUTEROL SULFATE 3 ML: .5; 3 SOLUTION RESPIRATORY (INHALATION) at 07:10

## 2017-10-31 RX ADMIN — FLUCONAZOLE 150 MG: 50 TABLET ORAL at 08:10

## 2017-10-31 RX ADMIN — IPRATROPIUM BROMIDE AND ALBUTEROL SULFATE 3 ML: .5; 3 SOLUTION RESPIRATORY (INHALATION) at 04:10

## 2017-10-31 RX ADMIN — POTASSIUM CHLORIDE 10 MEQ: 10 INJECTION, SOLUTION INTRAVENOUS at 08:10

## 2017-10-31 RX ADMIN — PROMETHAZINE HYDROCHLORIDE 12.5 MG: 12.5 TABLET ORAL at 11:10

## 2017-10-31 RX ADMIN — POTASSIUM CHLORIDE 40 MEQ: 20 SOLUTION ORAL at 08:10

## 2017-10-31 RX ADMIN — FLUTICASONE FUROATE AND VILANTEROL TRIFENATATE 1 PUFF: 100; 25 POWDER RESPIRATORY (INHALATION) at 07:10

## 2017-10-31 RX ADMIN — PANTOPRAZOLE SODIUM 20 MG: 20 TABLET, DELAYED RELEASE ORAL at 08:10

## 2017-10-31 RX ADMIN — ALPRAZOLAM 0.25 MG: 0.25 TABLET ORAL at 01:10

## 2017-10-31 RX ADMIN — LISINOPRIL 5 MG: 2.5 TABLET ORAL at 08:10

## 2017-10-31 NOTE — PROGRESS NOTES
I spoke to Abril with [facility] [phone]- They have had the pt in the past. He called their  last night and asked him to come baptize him. He stated that he didn't remember the name of his hospice but wants to resume with them. She is going to call the pts partner to inquire about when he can complete paperwork. Beena Upton LMSW     I send a 3 day placement packet, current meds and hospice consult to [facility] via [email or web address]. Beena Upton LMSW

## 2017-10-31 NOTE — DISCHARGE SUMMARY
Discharge Summary  Hospital Medicine    Admit Date: 10/29/2017    Date and Time: 10/31/966425:42 PM    Discharge Attending Physician: Analilia Nolasco MD    Primary Care Physician: Gilberto Rosas MD    Diagnoses:  Active Hospital Problems    Diagnosis  POA    *Acute on chronic respiratory failure with hypoxia and hypercapnia [J96.21, J96.22]  Yes    Malignant neoplasm of lower lobe of left lung [C34.32]  Yes    Radiation pneumonitis [J70.0]  Yes    COPD with acute exacerbation [J44.1]  Yes      Resolved Hospital Problems    Diagnosis Date Resolved POA   No resolved problems to display.     Discharged Condition: Fair    Hospital Course:   Male with current medical history of COPD, and just of heart failure, coronary artery disease, HIV, hypertension, prior history of MI and left lung cancer presents to the emergency room with chief complaint of shortness of breath.  Patient was admitted to the hospital Saturday morning area patient presented in respiratory distress.  Patient was admitted to the ICU.  Patient became very combative during hospitalization and left AMA.  Patient returned with ongoing shortness of breath.  Patient had been using albuterol treatments at home which have not alleviated his symptoms.  Patient is DO NOT RESUSCITATE/DO NOT INTUBATE.  Patient was agreeable to BiPAP when needed.  Patient has a history of noncompliance.  Patient was not exhibiting any aggressive behavior at the current time. Patient was admitted to Hospitalist medicine service. Patient was evaluated by Dr. Palacios for possible thoracentesis. Patient requested hospice arrangements at home. Patient declined further aggressive acre. Case management arranged home hospice as per patient wishes. Patient was discharged home in stable condition with following discharge plan of care. Total time with the patient was 30 minutes and greater than 50% was spent in counseling and coordination of care. The assessment and plan have been discussed  at length. Physicians' notes reviewed. Labs and procedure reviewed.     Consults: Dr. Palacios    Significant Diagnostic Studies:   CXR: Large left pleural effusion with compressive atelectasis with underlying consolidation not excluded. COPD.     CXR: Persistent extensive left lung opacification likely due to combination of pleural effusion and atelectasis with underlying consolidation not excluded.  COPD.  Microbiology Results (last 7 days)     Procedure Component Value Units Date/Time    Culture, Respiratory with Gram Stain [[id number]]     Order Status:  No result Specimen:  Respiratory         Special Treatments/Procedures: None  Disposition: Home with Hospice    Medications:  Reconciled Home Medications: Current Discharge Medication List      START taking these medications    Details   ALPRAZolam (XANAX) 0.25 MG tablet Take 1 tablet (0.25 mg total) by mouth 3 (three) times daily.  Qty: 10 tablet, Refills: 0      promethazine (PHENERGAN) 12.5 MG Tab Take 1 tablet (12.5 mg total) by mouth 3 (three) times daily before meals.  Qty: 30 tablet, Refills: 0         CONTINUE these medications which have CHANGED    Details   amoxicillin-clavulanate 875-125mg (AUGMENTIN) 875-125 mg per tablet Take 1 tablet by mouth 2 (two) times daily.  Qty: 20 tablet, Refills: 0      levoFLOXacin (LEVAQUIN) 500 MG tablet Take 1 tablet (500 mg total) by mouth once daily.  Qty: 7 tablet, Refills: 0         CONTINUE these medications which have NOT CHANGED    Details   albuterol-ipratropium 2.5mg-0.5mg/3mL (DUO-NEB) 0.5 mg-3 mg(2.5 mg base)/3 mL nebulizer solution Take 3 mLs by nebulization every 6 (six) hours as needed for Wheezing.  Qty: 25 vial, Refills: 0      efavirenz-emtrictabine-tenofovir 600-200-300 mg (ATRIPLA) 600-200-300 mg Tab Take 1 tablet by mouth every evening.       acetaminophen (TYLENOL) 500 MG tablet Take 500 mg by mouth every 6 (six) hours as needed. 2 TABS      albuterol (PROAIR HFA) 90 mcg/actuation inhaler Inhale 2 puffs  into the lungs every 6 (six) hours as needed for Wheezing or Shortness of Breath. Rescue      budesonide-formoterol 160-4.5 mcg (SYMBICORT) 160-4.5 mcg/actuation HFAA Inhale 2 puffs into the lungs daily as needed.       cholestyramine (QUESTRAN) 4 gram packet Take 4 g by mouth 2 (two) times daily.      fluconazole (DIFLUCAN) 150 MG Tab Take 1 tablet (150 mg total) by mouth once daily.  Qty: 7 tablet, Refills: 0      fluoxetine (PROZAC) 40 MG capsule Take 40 mg by mouth nightly.       furosemide (LASIX) 20 MG tablet Take 1 tablet (20 mg total) by mouth once daily.  Qty: 30 tablet, Refills: 0      gabapentin (NEURONTIN) 800 MG tablet Take 900 mg by mouth 3 (three) times daily.       hydrocortisone 1 % cream Apply topically 2 (two) times daily. To hip rash  Qty: 15 g, Refills: 0      lactobacillus acidophilus & bulgar (LACTINEX) 100 million cell packet Take 1 packet (1 each total) by mouth 2 (two) times daily.  Qty: 30 tablet, Refills: 0      lisinopril (PRINIVIL,ZESTRIL) 5 MG tablet Take 1 tablet (5 mg total) by mouth once daily.  Qty: 30 tablet, Refills: 0      metoprolol tartrate (LOPRESSOR) 25 MG tablet 12.5 mg 2 (two) times daily.   Refills: 3      morphine (MSIR) 15 MG tablet Take 15 mg by mouth every 2 (two) hours as needed for Pain.      nitroGLYCERIN (NITROSTAT) 0.3 MG SL tablet Place 0.3 mg under the tongue every 5 (five) minutes as needed for Chest pain.      ondansetron (ZOFRAN-ODT) 8 MG TbDL Take 1 tablet (8 mg total) by mouth every 8 (eight) hours as needed (nausea).  Qty: 30 tablet, Refills: 0      pantoprazole (PROTONIX) 20 MG tablet Take 1 tablet (20 mg total) by mouth once daily.  Qty: 30 tablet, Refills: 0      predniSONE (DELTASONE) 20 MG tablet Take 3 pills daily for 7 days, then 2 pills daily for 7 days, then 1 pill daily for 7 days, then 1/2 pill daily for 4 days.  Qty: 45 tablet, Refills: 0      senna-docusate 8.6-50 mg (PERICOLACE) 8.6-50 mg per tablet Take 1 tablet by mouth 2 (two) times daily  as needed for Constipation.      sucralfate (CARAFATE) 1 gram tablet Take 1 tablet (1 g total) by mouth 4 (four) times daily.  Qty: 20 tablet, Refills: 0      tiotropium (SPIRIVA) 18 mcg inhalation capsule Inhale 18 mcg into the lungs once daily.      topiramate (TOPAMAX) 50 MG tablet Take 100 mg by mouth every evening. 2 TABS HS      tramadol (ULTRAM) 50 mg tablet Take 50 mg by mouth every 6 (six) hours as needed for Pain.      valacyclovir (VALTREX) 500 MG tablet Take 500 mg by mouth 2 (two) times daily.      warfarin (COUMADIN) 2.5 MG tablet Take 1 tablet (2.5 mg total) by mouth Daily.  Refills: 2             Discharge Procedure Orders  Diet general   Order Comments: Cardiac/ 2 gram sodium low cholesterol diet     Other restrictions (specify):   Order Comments: Fall precautions     Call MD for:   Order Comments: For worsening symptoms, chest pain, shortness of breath, increased abdominal pain, high grade fever, stroke or stroke like symptoms, immediately go to the nearest Emergency Room or call 911 as soon as possible.       Follow-up Information     Gilberto Rosas MD.    Specialty:  Family Medicine  Why:  As needed  Contact information:  [address] [place] [address] [phone]             Please follow up.    Contact information:  Continue supplemental oxygen as before.

## 2017-10-31 NOTE — PHYSICIAN QUERY
PT Name: Boni Lerner  MR #: [id number]    Physician Query Form - HIV Clarification     CDS: Johanny Ulloa RN CCDS              Contact information: [email or web address]    This form is a permanent document in the medical record.     Query Date: October 31, 2017      By submitting this query, we are merely seeking further clarification of documentation. Please utilize your independent clinical judgment when addressing the question(s) below.    The Medical record contains the following:   Indicators   Supporting Clinical Findings Location in Medical Record   X HIV or AIDS HIV/AIDS,  ED note 10/29    CD4 Count          CD4%        Viral Load CD4= 33.0 LAB 10/29    History of Opportunistic Infection      Cancer  Pneumocystis Pneumonia (PCP)  Cytomegalovirus (CMV)  Kaposi Sarcoma      HIV-Related Conditions (e.g. Dementia, Encephalopathy) documented     X Medications Efavirenz Po  Emtricitabine Po  Tenofovir Po  Fluconazole Po   MAR  MAR 10/29  MAR 10/29  MAR 10/30   X Other Radiation pneumonitis    Malignant neoplasm of lower lobe of left lung       Consult note 10/30     Please clarify the patients HIV status  Per CDC publication Vol 60 RR-17 https://www.cdc.gov/mmwr/preview/mmwrhtml/zh3137v7.htmRelating to the classification HIV Infection, once a patient is diagnosed with AIDS the diagnosis still stands even if, after treatment, the CD4+ T cell count rises to above 200 per ìL of blood or other AIDS-defining illnesses are cured.       The following are AIDS-Defining Illnesses or HIV-Related Diseases.  Bacterial infections, multiple or recurrent only in children aged <6 years Kaposi sarcoma   Candidiasis of bronchi, trachea, or lungs Lymphoma, Burkitt (or equivalent term)   Candidiasis of esophagus Lymphoma, immunoblastic (or equivalent term)   Cervical cancer, invasive Only among adults, adolescents, and children aged > 6 years. Lymphoma, primary, of brain   Coccidioidomycosis, disseminated or extrapulmonary  Mycobacterium avium complex or Mycobacterium kansasii, disseminated or extrapulmonary   Cryptococcosis, extrapulmonary Mycobacterium tuberculosis of any site, pulmonary, disseminated, or extrapulmonary   Cryptosporidiosis, chronic intestinal (>1 months duration) Mycobacterium, other species or unidentified species, disseminated or extrapulmonary   Cytomegalovirus disease (other than liver, spleen, or nodes), onset at age >1 month Pneumocystis jirovecii (previously known as Pneumocystis carinii) pneumonia   Cytomegalovirus retinitis (with loss of vision) Pneumonia, recurrent Only among adults, adolescents, and children aged .6 years.   Encephalopathy attributed to HIV Progressive multifocal leukoencephalopathy   Herpes simplex: chronic ulcers (>1 months duration) or bronchitis, pneumonitis, or esophagitis (onset at age >1 month) Salmonella septicemia, recurrent   Histoplasmosis, disseminated or extrapulmonary Toxoplasmosis of brain, onset at age >1 month   Isosporiasis, chronic intestinal (>1 months duration) Wasting syndrome attributed to HIV     [ ] Asymptomatic HIV Infection - Positive Status Only - without any history of (or current) AIDS-Defining Illness or HIV-Related Illness    [x ] HIV Disease / AIDS - Meets the current CDC Definition of AIDS: HIV-infected persons who have less than 200 CD4+ T-lymphocytes/uL, or CD4+ T-lymphocyte percentage of total lymphocytes of less than 14, and/or an AIDS-Defining Illness or HIV-Related Disease (see above).    [ ] Patient is HIV Negative  [ ] Other (please specify): ____________________________________  [ ] Clinically Undetermined    Please document in your progress notes daily for the duration of treatment until resolved and include in your discharge summary.

## 2017-10-31 NOTE — PLAN OF CARE
"   10/30/17 2043   Patient Assessment/Suction   All Lung Fields Breath Sounds diminished   Rhythm/Pattern, Respiratory depth regular;pattern regular;unlabored   PRE-TX-O2-ETCO2   O2 Device (Oxygen Therapy) nasal cannula   Flow (L/min) 4   SpO2 (!) 92 %   Pulse Oximetry Type Intermittent   $ Pulse Oximetry - Multiple Charge Pulse Oximetry - Multiple   Pulse 68   Resp 20   Aerosol Therapy   $ Aerosol Therapy Charges Aerosol Treatment   Respiratory Treatment Status given   SVN/Inhaler Treatment Route mask   Position During Treatment HOB at 45 degrees   Patient Tolerance good   Post-Treatment   Post-treatment Heart Rate (beats/min) 72   Post-treatment Resp Rate (breaths/min) 20   All Fields Breath Sounds diminished   Incentive Spirometer   $ Incentive Spirometer Charges refused   Preset CPAP/BiPAP Settings   Mode Of Delivery other (see comments)  (on standby, refusing)   patient stated "I am tired and I don't want to live like this anymore".  Refusing some therapy at this time.   "

## 2017-10-31 NOTE — NURSING
Pt to be discharged home with hospice. IV DC'ed, catheter intact. Tele box returned. Prescriptions placed in discharge folder. All questions answered.

## 2017-10-31 NOTE — PLAN OF CARE
Problem: Mobility, Physical Impaired (Adult)  Intervention: Optimize Mobility   10/31/17 0454   Positioning   Positioning/Transfer Devices (pillows for support)   Activity   Activity Type bedrest     Intervention: Promote Energy Conservation   10/31/17 0454   Pain/Comfort/Sleep Interventions   Sleep/Rest Enhancement awakenings minimized       Goal: Identify Related Risk Factors and Signs and Symptoms  Related risk factors and signs and symptoms are identified upon initiation of Human Response Clinical Practice Guideline (CPG)    10/31/17 0454   Mobility, Physical Impaired   Related Risk Factors (Physical Mobility, Impaired) activity intolerance;disease process;fatigue;functional decline   Signs and Symptoms (Physical Mobility Impaired) limited ability to perform gross/fine motor skills

## 2017-10-31 NOTE — PROGRESS NOTES
Aerosol tx q4, mdi qday     10/31/17 0742 10/31/17 0750   Patient Assessment/Suction   Level of Consciousness (AVPU) alert --    Respiratory Effort Unlabored --    Expansion/Accessory Muscles/Retractions no use of accessory muscles --    All Lung Fields Breath Sounds diminished;wheezes, expiratory --    PRE-TX-O2-ETCO2   O2 Device (Oxygen Therapy) nasal cannula --    $ Is the patient on Low Flow Oxygen? Yes --    Flow (L/min) 4 --    Oxygen Concentration (%) 36 --    SpO2 100 % --    Pulse 67 67   Resp 18 18   Aerosol Therapy   $ Aerosol Therapy Charges Aerosol Treatment --    Respiratory Treatment Status given --    SVN/Inhaler Treatment Route mask --    Position During Treatment HOB at 30 degrees --    Patient Tolerance good --    Inhaler   $ Inhaler Charges --  MDI (Metered Dose Inahler) Treatment;Mouth rinsed post treatment   Respiratory Treatment Status --  given   SVN/Inhaler Treatment Route --  mouthpiece   Patient Tolerance --  good   Post-Treatment   Post-treatment Heart Rate (beats/min) 69 69   Post-treatment Resp Rate (breaths/min) 18 18   All Fields Breath Sounds unchanged --    Incentive Spirometer   $ Incentive Spirometer Charges refused --    Ready to Wean/Extubation Screen   FIO2<=50 (chart decimal) 0.36 --

## 2017-10-31 NOTE — PROGRESS NOTES
I sent discharge orders and AVS to [facility] via [facility]. I selected [place] as the discharge destination in [employer].  Beena Upton LMSW

## 2017-10-31 NOTE — NURSING
Results for POP SCHAFER (MRN [id number]) as of 10/31/2017 06:17   Ref. Range 10/31/2017 04:28   Potassium Latest Ref Range: 3.5 - 5.1 mmol/L 2.7 (LL)   Chloride Latest Ref Range: 95 - 110 mmol/L 93 (L)   CO2 Latest Ref Range: 23 - 29 mmol/L 42 (HH)   ZAIN Perez NP notified.  New orders received.

## 2017-11-01 NOTE — PLAN OF CARE
11/01/17 0815   Final Note   Assessment Type Final Discharge Note   Discharge Disposition [facility]   Discharge plans and expectations educations in teach back method with documentation complete? Yes

## 2017-11-02 LAB
BACTERIA BLD CULT: NORMAL
BACTERIA BLD CULT: NORMAL

## 2019-07-16 RX ORDER — FUROSEMIDE 20 MG/1
TABLET ORAL
Qty: 90 TABLET | Refills: 0 | OUTPATIENT
Start: 2019-07-16

## 2019-07-16 RX ORDER — LISINOPRIL 5 MG/1
TABLET ORAL
Qty: 90 TABLET | Refills: 0 | OUTPATIENT
Start: 2019-07-16

## 2022-10-13 NOTE — PROGRESS NOTES
I LMOM with Dr. Lamine Ovalle instructions. I did tell her she might want to take the 2nd Lasix in the afternoon to prevent nocturia. Progress Note  Hospital Medicine  Patient Name:Boni Lerner  MRN:  [id number]  Patient Class: IP- Inpatient  Admit Date: 6/7/2017  Length of Stay: 1 days  Expected Discharge Date:   Attending Physician: Analilia Nolasco MD  Primary Care Provider:  Gilberto Rosas MD    SUBJECTIVE:     Principal Problem: Slurring of speech  Initial history of present illness: Patient is a 71 y.o. male admitted to Hospitalist Service from [facility] with complaint of slurring of speech for a couple of week. Patient has PMH significant for HIV, CAD s/p stent placement, hypertension, COPD. Part of the history obtained from patient's nephew. Patient reported progressively worsening SOB for almost 1 week with associated yellow green productive cough  Without any associated fever. Patient recently started on Requip by PCP for tremors. Patient presented with slurred speech and double vision that started approximately 2-3 weeks ago when he began Requip. He stopped the medicine and symptoms slightly improved but then he restarted approximately week ago as well as began taking Mucinex and symptoms of return.  His nephew, with whom he lives, states he has slurred speech and slight dizziness.  He denied any focal weakness or numbness in his extremities, difficulty swallowing,. Patient denied chest pain, abdominal pain, nausea, vomiting, headache, vision changes, focal neuro-deficits, cough or fever.    PMH/PSH/SH/FH/Meds: reviewed.    Symptoms/Review of Systems: Slurring much improved. No new focal neuro-deficits reported. Less SOB, cough and wheezing reported. No chest pain or headache, fever or abdominal pain.     Diet:  Adequate intake.    Activity level: Normal.    Pain:  Patient reports no pain.       OBJECTIVE:   Vital Signs (Most Recent):      Temp: 97.9 °F (36.6 °C) (06/08/17 0735)  Pulse: 65 (06/08/17 0923)  Resp: 16 (06/08/17 0923)  BP: 121/64 (06/08/17 0735)  SpO2: 98 % (06/08/17 0923)       Vital Signs Range  (Last 24H):  Temp:  [97.5 °F (36.4 °C)-98.5 °F (36.9 °C)]   Pulse:  [58-83]   Resp:  [14-20]   BP: (113-150)/(63-77)   SpO2:  [95 %-100 %]     Weight: 54.3 kg (119 lb 9.6 oz)  Body mass index is 16.68 kg/m².    Intake/Output Summary (Last 24 hours) at 06/08/17 0941  Last data filed at 06/08/17 0600   Gross per 24 hour   Intake              769 ml   Output              300 ml   Net              469 ml     Physical Examination:  General appearance: well developed, appears stated age  Head: normocephalic, atraumatic  Eyes:  conjunctivae/corneas clear. PERRL. Appears to have a slight ocular nerve palsy when looking upwards in the right eye.   Nose: Nares normal. Septum midline.  Throat: lips, dry mucosa, and tongue normal; teeth and gums normal, no throat erythema.  Neck: supple, symmetrical, trachea midline, no JVD and thyroid not enlarged, symmetric, no tenderness/mass/nodules  Lungs:  Scattered rhonchi B/L.  Chest wall: no tenderness  Heart: regular rate and rhythm, S1, S2 normal, no murmur, click, rub or gallop  Abdomen: soft, non-tender non-distented; bowel sounds normal; no masses,  no organomegaly  Extremities: no cyanosis, clubbing or edema.   Pulses: 2+ and symmetric  Skin: Skin color, texture, turgor normal. No rashes or lesions.  Lymph nodes: Cervical, supraclavicular, and axillary nodes normal.  Neurologic: Normal strength and tone. CNII-XII intact except CN 4 partial palsy. Right lower face possible drooping.    CBC:    Recent Labs  Lab 06/07/17  0953 06/08/17  0531   WBC 9.60 5.10   RBC 3.70* 3.30*   HGB 12.2* 10.9*   HCT 37.5* 33.4*    144*   * 101*   MCH 33.0* 33.2*   MCHC 32.6 32.7   BMP    Recent Labs  Lab 06/07/17  0953 06/08/17  0531   GLU 94 127*    139   K 4.3 4.5    108   CO2 29 26   BUN 20 21   CREATININE 1.1 1.1   CALCIUM 8.8 8.6*      Diagnostic Results:  Microbiology Results (last 7 days)     ** No results found for the last 168 hours. **      Chest X-Ray: COPD  without acute process or significant change.     CT Head: No acute intracranial process.   Chronic left basal ganglia lacunar infarct.  Left maxillary sinus disease.    MRA of head and neck:  Negative MRA of the carotid and vertebral arteries    MRI brain: Mild brain atrophy with deep white matter ischemic changes.  Probable old ischemic injury of the left internal capsule without hemorrhage or mass effect.  No acute lesions identified on diffusion weighting.  Incidentally noted is left maxillary sinusitis.    Assessment/Plan:      *Slurring of speech [R47.81] - no acute CVA per MRI.  Fall and seizure precautions.  Continue Plavix.  Radiology  Results reviewed with patient.  Continue Physical Therapy and Occupational therapy.     Coumadin Toxicity  Received PO Vit. K. Continue to hold Coumadin.      Yes    Moderate malnutrition [E44.0]  Severe. Nutrition consulted. Encourage maximal PO intake. Diet supplementation ordered per nutrition approval. Will encourage PO and monitor closely for weight changes.      Yes    Chronic obstructive pulmonary disease with acute exacerbation [J44.1]  Supplemental O2 via nasal canula; titrate O2 saturation to >92%.   Reduce Solumedrol 62.5 mg IV q 12 hrs.   Continue beta 2 agonist bronchodilator treatments.   Continue IV antibiotics.  Check sputum GS and Cx.   Continue routine medications as before.   Smoking cessation counseling performed again.     Yes    CAD (coronary artery disease) [I25.10]  Patient with known CAD and monitor for S/Sx of angina/ACS. Continue to monitor on telemetry.     Low Folic acid level  Start Folic acid supplementation.     Yes    HIV (human immunodeficiency virus infection) [Z21]  Continue HAART therapy.   Yes       VTE Risk Mitigation     None        Analilia Nolasco MD  Department of Hospital Medicine   [facility]